# Patient Record
Sex: MALE | Race: WHITE | NOT HISPANIC OR LATINO | Employment: FULL TIME | URBAN - METROPOLITAN AREA
[De-identification: names, ages, dates, MRNs, and addresses within clinical notes are randomized per-mention and may not be internally consistent; named-entity substitution may affect disease eponyms.]

---

## 2019-02-20 ENCOUNTER — HOSPITAL ENCOUNTER (OUTPATIENT)
Facility: HOSPITAL | Age: 49
Discharge: HOME | End: 2019-02-21
Attending: EMERGENCY MEDICINE | Admitting: SURGERY
Payer: COMMERCIAL

## 2019-02-20 ENCOUNTER — ANESTHESIA EVENT (OUTPATIENT)
Dept: SURGERY | Facility: HOSPITAL | Age: 49
End: 2019-02-20
Payer: COMMERCIAL

## 2019-02-20 ENCOUNTER — ANESTHESIA (OUTPATIENT)
Dept: SURGERY | Facility: HOSPITAL | Age: 49
End: 2019-02-20
Payer: COMMERCIAL

## 2019-02-20 DIAGNOSIS — K61.1 PERIRECTAL ABSCESS: Primary | ICD-10-CM

## 2019-02-20 LAB
ALBUMIN SERPL BCP-MCNC: 3.9 G/DL
ALP SERPL-CCNC: 90 U/L
ALT SERPL W/O P-5'-P-CCNC: 50 U/L
ANION GAP SERPL CALC-SCNC: 10 MMOL/L
AST SERPL-CCNC: 24 U/L
BASOPHILS # BLD AUTO: 0.04 K/UL
BASOPHILS NFR BLD: 0.5 %
BILIRUB SERPL-MCNC: 0.5 MG/DL
BUN SERPL-MCNC: 13 MG/DL
CALCIUM SERPL-MCNC: 9.7 MG/DL
CHLORIDE SERPL-SCNC: 108 MMOL/L
CO2 SERPL-SCNC: 23 MMOL/L
CREAT SERPL-MCNC: 1 MG/DL
DIFFERENTIAL METHOD: ABNORMAL
EOSINOPHIL # BLD AUTO: 0.3 K/UL
EOSINOPHIL NFR BLD: 3.1 %
ERYTHROCYTE [DISTWIDTH] IN BLOOD BY AUTOMATED COUNT: 13.9 %
EST. GFR  (AFRICAN AMERICAN): >60 ML/MIN/1.73 M^2
EST. GFR  (NON AFRICAN AMERICAN): >60 ML/MIN/1.73 M^2
GLUCOSE SERPL-MCNC: 115 MG/DL
HCT VFR BLD AUTO: 45 %
HGB BLD-MCNC: 14.3 G/DL
LACTATE SERPL-SCNC: 1.3 MMOL/L
LYMPHOCYTES # BLD AUTO: 2.2 K/UL
LYMPHOCYTES NFR BLD: 25.3 %
MCH RBC QN AUTO: 28 PG
MCHC RBC AUTO-ENTMCNC: 31.8 G/DL
MCV RBC AUTO: 88 FL
MONOCYTES # BLD AUTO: 0.5 K/UL
MONOCYTES NFR BLD: 6.1 %
NEUTROPHILS # BLD AUTO: 5.6 K/UL
NEUTROPHILS NFR BLD: 64.5 %
PLATELET # BLD AUTO: 273 K/UL
PMV BLD AUTO: 11.3 FL
POTASSIUM SERPL-SCNC: 4.3 MMOL/L
PROT SERPL-MCNC: 7.3 G/DL
RBC # BLD AUTO: 5.11 M/UL
SODIUM SERPL-SCNC: 141 MMOL/L
WBC # BLD AUTO: 8.73 K/UL

## 2019-02-20 PROCEDURE — 63600175 PHARM REV CODE 636 W HCPCS: Performed by: NURSE ANESTHETIST, CERTIFIED REGISTERED

## 2019-02-20 PROCEDURE — 85025 COMPLETE CBC W/AUTO DIFF WBC: CPT

## 2019-02-20 PROCEDURE — 37000009 HC ANESTHESIA EA ADD 15 MINS: Performed by: SURGERY

## 2019-02-20 PROCEDURE — 87205 SMEAR GRAM STAIN: CPT

## 2019-02-20 PROCEDURE — 80053 COMPREHEN METABOLIC PANEL: CPT

## 2019-02-20 PROCEDURE — 25000003 PHARM REV CODE 250: Performed by: SURGERY

## 2019-02-20 PROCEDURE — 25000003 PHARM REV CODE 250: Performed by: NURSE PRACTITIONER

## 2019-02-20 PROCEDURE — 87116 MYCOBACTERIA CULTURE: CPT

## 2019-02-20 PROCEDURE — 27000190 HC CPAP FULL FACE MASK W/VALVE

## 2019-02-20 PROCEDURE — 25000003 PHARM REV CODE 250: Performed by: ANESTHESIOLOGY

## 2019-02-20 PROCEDURE — G0378 HOSPITAL OBSERVATION PER HR: HCPCS

## 2019-02-20 PROCEDURE — C9290 INJ, BUPIVACAINE LIPOSOME: HCPCS | Performed by: SURGERY

## 2019-02-20 PROCEDURE — 83605 ASSAY OF LACTIC ACID: CPT

## 2019-02-20 PROCEDURE — 25000003 PHARM REV CODE 250: Performed by: STUDENT IN AN ORGANIZED HEALTH CARE EDUCATION/TRAINING PROGRAM

## 2019-02-20 PROCEDURE — 36000704 HC OR TIME LEV I 1ST 15 MIN: Performed by: SURGERY

## 2019-02-20 PROCEDURE — 36000705 HC OR TIME LEV I EA ADD 15 MIN: Performed by: SURGERY

## 2019-02-20 PROCEDURE — 63600175 PHARM REV CODE 636 W HCPCS: Performed by: STUDENT IN AN ORGANIZED HEALTH CARE EDUCATION/TRAINING PROGRAM

## 2019-02-20 PROCEDURE — 25000003 PHARM REV CODE 250: Performed by: NURSE ANESTHETIST, CERTIFIED REGISTERED

## 2019-02-20 PROCEDURE — 94660 CPAP INITIATION&MGMT: CPT

## 2019-02-20 PROCEDURE — 99900035 HC TECH TIME PER 15 MIN (STAT)

## 2019-02-20 PROCEDURE — 87070 CULTURE OTHR SPECIMN AEROBIC: CPT

## 2019-02-20 PROCEDURE — 63600175 PHARM REV CODE 636 W HCPCS: Performed by: SURGERY

## 2019-02-20 PROCEDURE — 99285 EMERGENCY DEPT VISIT HI MDM: CPT | Mod: 25

## 2019-02-20 PROCEDURE — 87015 SPECIMEN INFECT AGNT CONCNTJ: CPT

## 2019-02-20 PROCEDURE — 87076 CULTURE ANAEROBE IDENT EACH: CPT

## 2019-02-20 PROCEDURE — 96372 THER/PROPH/DIAG INJ SC/IM: CPT | Mod: 59

## 2019-02-20 PROCEDURE — 87075 CULTR BACTERIA EXCEPT BLOOD: CPT

## 2019-02-20 PROCEDURE — 96360 HYDRATION IV INFUSION INIT: CPT

## 2019-02-20 PROCEDURE — 87040 BLOOD CULTURE FOR BACTERIA: CPT

## 2019-02-20 PROCEDURE — 87206 SMEAR FLUORESCENT/ACID STAI: CPT

## 2019-02-20 PROCEDURE — 94761 N-INVAS EAR/PLS OXIMETRY MLT: CPT

## 2019-02-20 PROCEDURE — S0030 INJECTION, METRONIDAZOLE: HCPCS | Performed by: NURSE ANESTHETIST, CERTIFIED REGISTERED

## 2019-02-20 PROCEDURE — 37000008 HC ANESTHESIA 1ST 15 MINUTES: Performed by: SURGERY

## 2019-02-20 PROCEDURE — 87102 FUNGUS ISOLATION CULTURE: CPT

## 2019-02-20 PROCEDURE — 71000033 HC RECOVERY, INTIAL HOUR: Performed by: SURGERY

## 2019-02-20 PROCEDURE — 25500020 PHARM REV CODE 255: Performed by: EMERGENCY MEDICINE

## 2019-02-20 RX ORDER — GLYCOPYRROLATE 0.2 MG/ML
INJECTION INTRAMUSCULAR; INTRAVENOUS
Status: DISCONTINUED | OUTPATIENT
Start: 2019-02-20 | End: 2019-02-20

## 2019-02-20 RX ORDER — BUPIVACAINE HYDROCHLORIDE 2.5 MG/ML
INJECTION, SOLUTION EPIDURAL; INFILTRATION; INTRACAUDAL
Status: DISCONTINUED | OUTPATIENT
Start: 2019-02-20 | End: 2019-02-20 | Stop reason: HOSPADM

## 2019-02-20 RX ORDER — ONDANSETRON 8 MG/1
8 TABLET, ORALLY DISINTEGRATING ORAL EVERY 8 HOURS PRN
Status: DISCONTINUED | OUTPATIENT
Start: 2019-02-20 | End: 2019-02-21 | Stop reason: HOSPADM

## 2019-02-20 RX ORDER — KETOROLAC TROMETHAMINE 30 MG/ML
30 INJECTION, SOLUTION INTRAMUSCULAR; INTRAVENOUS EVERY 6 HOURS
Status: DISCONTINUED | OUTPATIENT
Start: 2019-02-20 | End: 2019-02-21 | Stop reason: HOSPADM

## 2019-02-20 RX ORDER — ONDANSETRON 2 MG/ML
4 INJECTION INTRAMUSCULAR; INTRAVENOUS
Status: ACTIVE | OUTPATIENT
Start: 2019-02-20 | End: 2019-02-20

## 2019-02-20 RX ORDER — HYDRALAZINE HYDROCHLORIDE 20 MG/ML
INJECTION INTRAMUSCULAR; INTRAVENOUS
Status: DISCONTINUED | OUTPATIENT
Start: 2019-02-20 | End: 2019-02-20

## 2019-02-20 RX ORDER — METRONIDAZOLE 500 MG/100ML
INJECTION, SOLUTION INTRAVENOUS
Status: DISCONTINUED | OUTPATIENT
Start: 2019-02-20 | End: 2019-02-20

## 2019-02-20 RX ORDER — CLINDAMYCIN HYDROCHLORIDE 150 MG/1
600 CAPSULE ORAL
Status: COMPLETED | OUTPATIENT
Start: 2019-02-20 | End: 2019-02-20

## 2019-02-20 RX ORDER — ESMOLOL HYDROCHLORIDE 10 MG/ML
INJECTION INTRAVENOUS
Status: DISCONTINUED | OUTPATIENT
Start: 2019-02-20 | End: 2019-02-20

## 2019-02-20 RX ORDER — PROPOFOL 10 MG/ML
VIAL (ML) INTRAVENOUS
Status: DISCONTINUED | OUTPATIENT
Start: 2019-02-20 | End: 2019-02-20

## 2019-02-20 RX ORDER — SUCCINYLCHOLINE CHLORIDE 20 MG/ML
INJECTION INTRAMUSCULAR; INTRAVENOUS
Status: DISCONTINUED | OUTPATIENT
Start: 2019-02-20 | End: 2019-02-20

## 2019-02-20 RX ORDER — ONDANSETRON HYDROCHLORIDE 2 MG/ML
INJECTION, SOLUTION INTRAMUSCULAR; INTRAVENOUS
Status: DISCONTINUED | OUTPATIENT
Start: 2019-02-20 | End: 2019-02-20

## 2019-02-20 RX ORDER — MORPHINE SULFATE 4 MG/ML
2 INJECTION, SOLUTION INTRAMUSCULAR; INTRAVENOUS EVERY 5 MIN PRN
Status: DISCONTINUED | OUTPATIENT
Start: 2019-02-20 | End: 2019-02-20 | Stop reason: HOSPADM

## 2019-02-20 RX ORDER — MORPHINE SULFATE 4 MG/ML
4 INJECTION, SOLUTION INTRAMUSCULAR; INTRAVENOUS
Status: ACTIVE | OUTPATIENT
Start: 2019-02-20 | End: 2019-02-20

## 2019-02-20 RX ORDER — LIDOCAINE HYDROCHLORIDE 10 MG/ML
1 INJECTION, SOLUTION EPIDURAL; INFILTRATION; INTRACAUDAL; PERINEURAL ONCE
Status: DISCONTINUED | OUTPATIENT
Start: 2019-02-20 | End: 2019-02-21 | Stop reason: HOSPADM

## 2019-02-20 RX ORDER — IBUPROFEN 600 MG/1
600 TABLET ORAL
Status: COMPLETED | OUTPATIENT
Start: 2019-02-20 | End: 2019-02-20

## 2019-02-20 RX ORDER — SODIUM CHLORIDE 0.9 % (FLUSH) 0.9 %
3 SYRINGE (ML) INJECTION
Status: DISCONTINUED | OUTPATIENT
Start: 2019-02-20 | End: 2019-02-21 | Stop reason: HOSPADM

## 2019-02-20 RX ORDER — DIPHENHYDRAMINE HYDROCHLORIDE 50 MG/ML
25 INJECTION INTRAMUSCULAR; INTRAVENOUS EVERY 6 HOURS PRN
Status: DISCONTINUED | OUTPATIENT
Start: 2019-02-20 | End: 2019-02-20 | Stop reason: HOSPADM

## 2019-02-20 RX ORDER — ENOXAPARIN SODIUM 100 MG/ML
40 INJECTION SUBCUTANEOUS EVERY 24 HOURS
Status: DISCONTINUED | OUTPATIENT
Start: 2019-02-20 | End: 2019-02-21 | Stop reason: HOSPADM

## 2019-02-20 RX ORDER — ACETAMINOPHEN 325 MG/1
650 TABLET ORAL EVERY 8 HOURS PRN
Status: DISCONTINUED | OUTPATIENT
Start: 2019-02-20 | End: 2019-02-21 | Stop reason: HOSPADM

## 2019-02-20 RX ORDER — RAMELTEON 8 MG/1
8 TABLET ORAL NIGHTLY PRN
Status: DISCONTINUED | OUTPATIENT
Start: 2019-02-20 | End: 2019-02-21 | Stop reason: HOSPADM

## 2019-02-20 RX ORDER — DIPHENHYDRAMINE HYDROCHLORIDE 50 MG/ML
25 INJECTION INTRAMUSCULAR; INTRAVENOUS EVERY 4 HOURS PRN
Status: DISCONTINUED | OUTPATIENT
Start: 2019-02-20 | End: 2019-02-21 | Stop reason: HOSPADM

## 2019-02-20 RX ORDER — FENTANYL CITRATE 50 UG/ML
INJECTION, SOLUTION INTRAMUSCULAR; INTRAVENOUS
Status: DISCONTINUED | OUTPATIENT
Start: 2019-02-20 | End: 2019-02-20

## 2019-02-20 RX ORDER — CEFAZOLIN SODIUM 1 G/3ML
INJECTION, POWDER, FOR SOLUTION INTRAMUSCULAR; INTRAVENOUS
Status: DISCONTINUED | OUTPATIENT
Start: 2019-02-20 | End: 2019-02-20

## 2019-02-20 RX ORDER — SODIUM CHLORIDE, SODIUM LACTATE, POTASSIUM CHLORIDE, CALCIUM CHLORIDE 600; 310; 30; 20 MG/100ML; MG/100ML; MG/100ML; MG/100ML
INJECTION, SOLUTION INTRAVENOUS CONTINUOUS PRN
Status: DISCONTINUED | OUTPATIENT
Start: 2019-02-20 | End: 2019-02-20

## 2019-02-20 RX ORDER — ONDANSETRON 2 MG/ML
4 INJECTION INTRAMUSCULAR; INTRAVENOUS DAILY PRN
Status: DISCONTINUED | OUTPATIENT
Start: 2019-02-20 | End: 2019-02-20 | Stop reason: HOSPADM

## 2019-02-20 RX ORDER — SODIUM CHLORIDE 0.9 % (FLUSH) 0.9 %
3 SYRINGE (ML) INJECTION EVERY 8 HOURS
Status: DISCONTINUED | OUTPATIENT
Start: 2019-02-20 | End: 2019-02-20 | Stop reason: HOSPADM

## 2019-02-20 RX ORDER — OXYMETAZOLINE HCL 0.05 %
2 SPRAY, NON-AEROSOL (ML) NASAL 2 TIMES DAILY
Status: DISCONTINUED | OUTPATIENT
Start: 2019-02-20 | End: 2019-02-21 | Stop reason: HOSPADM

## 2019-02-20 RX ORDER — BACITRACIN 50000 [IU]/1
INJECTION, POWDER, FOR SOLUTION INTRAMUSCULAR
Status: DISCONTINUED | OUTPATIENT
Start: 2019-02-20 | End: 2019-02-20 | Stop reason: HOSPADM

## 2019-02-20 RX ORDER — LIDOCAINE HYDROCHLORIDE 10 MG/ML
INJECTION, SOLUTION EPIDURAL; INFILTRATION; INTRACAUDAL; PERINEURAL
Status: DISCONTINUED | OUTPATIENT
Start: 2019-02-20 | End: 2019-02-20 | Stop reason: HOSPADM

## 2019-02-20 RX ORDER — MIDAZOLAM HYDROCHLORIDE 1 MG/ML
INJECTION INTRAMUSCULAR; INTRAVENOUS
Status: DISCONTINUED | OUTPATIENT
Start: 2019-02-20 | End: 2019-02-20

## 2019-02-20 RX ORDER — LIDOCAINE HCL/PF 100 MG/5ML
SYRINGE (ML) INTRAVENOUS
Status: DISCONTINUED | OUTPATIENT
Start: 2019-02-20 | End: 2019-02-20

## 2019-02-20 RX ORDER — OMEPRAZOLE 20 MG/1
40 CAPSULE, DELAYED RELEASE ORAL NIGHTLY
COMMUNITY
End: 2019-04-03

## 2019-02-20 RX ORDER — CIPROFLOXACIN 500 MG/1
500 TABLET ORAL EVERY 12 HOURS
Status: DISCONTINUED | OUTPATIENT
Start: 2019-02-20 | End: 2019-02-21 | Stop reason: HOSPADM

## 2019-02-20 RX ORDER — VENLAFAXINE 25 MG/1
TABLET ORAL NIGHTLY
COMMUNITY
End: 2020-05-14

## 2019-02-20 RX ORDER — METRONIDAZOLE 500 MG/1
500 TABLET ORAL EVERY 8 HOURS
Status: DISCONTINUED | OUTPATIENT
Start: 2019-02-20 | End: 2019-02-21 | Stop reason: HOSPADM

## 2019-02-20 RX ORDER — TRAMADOL HYDROCHLORIDE 50 MG/1
100 TABLET ORAL EVERY 6 HOURS PRN
Status: DISCONTINUED | OUTPATIENT
Start: 2019-02-20 | End: 2019-02-21 | Stop reason: HOSPADM

## 2019-02-20 RX ADMIN — LIDOCAINE HYDROCHLORIDE 100 MG: 20 INJECTION, SOLUTION INTRAVENOUS at 06:02

## 2019-02-20 RX ADMIN — GLYCOPYRROLATE 0.2 MG: 0.2 INJECTION, SOLUTION INTRAMUSCULAR; INTRAVENOUS at 06:02

## 2019-02-20 RX ADMIN — METRONIDAZOLE 500 MG: 500 TABLET ORAL at 09:02

## 2019-02-20 RX ADMIN — ESMOLOL HYDROCHLORIDE 50 MG: 10 INJECTION, SOLUTION INTRAVENOUS at 06:02

## 2019-02-20 RX ADMIN — SODIUM CHLORIDE 1000 ML: 0.9 INJECTION, SOLUTION INTRAVENOUS at 10:02

## 2019-02-20 RX ADMIN — Medication 2 SPRAY: at 07:02

## 2019-02-20 RX ADMIN — HYDRALAZINE HYDROCHLORIDE 10 MG: 20 INJECTION INTRAMUSCULAR; INTRAVENOUS at 06:02

## 2019-02-20 RX ADMIN — CEFAZOLIN 3 G: 330 INJECTION, POWDER, FOR SOLUTION INTRAMUSCULAR; INTRAVENOUS at 06:02

## 2019-02-20 RX ADMIN — IBUPROFEN 600 MG: 600 TABLET, FILM COATED ORAL at 01:02

## 2019-02-20 RX ADMIN — SUCCINYLCHOLINE CHLORIDE 200 MG: 20 INJECTION, SOLUTION INTRAMUSCULAR; INTRAVENOUS at 06:02

## 2019-02-20 RX ADMIN — ONDANSETRON 8 MG: 2 INJECTION, SOLUTION INTRAMUSCULAR; INTRAVENOUS at 06:02

## 2019-02-20 RX ADMIN — PROPOFOL 50 MG: 10 INJECTION, EMULSION INTRAVENOUS at 06:02

## 2019-02-20 RX ADMIN — CIPROFLOXACIN HYDROCHLORIDE 500 MG: 500 TABLET, FILM COATED ORAL at 09:02

## 2019-02-20 RX ADMIN — KETOROLAC TROMETHAMINE 30 MG: 30 INJECTION, SOLUTION INTRAMUSCULAR at 09:02

## 2019-02-20 RX ADMIN — CLINDAMYCIN HYDROCHLORIDE 600 MG: 150 CAPSULE ORAL at 11:02

## 2019-02-20 RX ADMIN — METRONIDAZOLE 500 MG: 500 SOLUTION INTRAVENOUS at 06:02

## 2019-02-20 RX ADMIN — PROPOFOL 200 MG: 10 INJECTION, EMULSION INTRAVENOUS at 06:02

## 2019-02-20 RX ADMIN — SODIUM CHLORIDE, SODIUM LACTATE, POTASSIUM CHLORIDE, AND CALCIUM CHLORIDE: .6; .31; .03; .02 INJECTION, SOLUTION INTRAVENOUS at 06:02

## 2019-02-20 RX ADMIN — ENOXAPARIN SODIUM 40 MG: 100 INJECTION SUBCUTANEOUS at 09:02

## 2019-02-20 RX ADMIN — IOHEXOL 100 ML: 350 INJECTION, SOLUTION INTRAVENOUS at 11:02

## 2019-02-20 RX ADMIN — FENTANYL CITRATE 150 MCG: 50 INJECTION, SOLUTION INTRAMUSCULAR; INTRAVENOUS at 06:02

## 2019-02-20 NOTE — ED TRIAGE NOTES
Pt reports rectal pain 10 out of 10 on pain scale. Pt denies trauma to rectum or bleeding. Pt states he ate pizza 2 weeks ago and since then has pain to left side of rectum. Pt is using Hemorid cream, rectal care, Cortizone, suppository at home, pt states he has hx. Of Hemorid and took 3 Advil today.

## 2019-02-20 NOTE — ED NOTES
APPEARANCE: Alert, oriented and in no acute distress.  CARDIAC: bradycardic   PERIPHERAL VASCULAR: peripheral pulses present. Normal cap refill. No edema. Warm to touch.    RESPIRATORY:Normal rate and effort, breath sounds clear bilaterally throughout chest. Respirations are equal and unlabored no obvious signs of distress.  GASTRO: soft, bowel sounds normal, no tenderness, no abdominal distention. Burning in rectum while passing stool.  MUSC: Full ROM. No bony tenderness or soft tissue tenderness. No obvious deformity.  SKIN: Skin is warm and dry, normal skin turgor, mucous membranes moist.   MENTAL STATUS: awake, alert and aware of environment.  EYE: PERRL, both eyes: pupils brisk and reactive to light. Normal size.  ENT: EARS: no obvious drainage. NOSE: no active bleeding.     GENITALIA: Normal external genitalia.   Pt denies chest pain/sob.

## 2019-02-20 NOTE — H&P
General Surgery Service  H&P    Chief Complaint:  Anal pain    HPI:  48M c 2 week history of anal pain  No prior cscope  Has a gastroenterologist for UGI symptoms of GERD  No history of anal complaints  Has normal daily BMs    PMH:  History reviewed. No pertinent past medical history.    PSH:  Past Surgical History:   Procedure Laterality Date    APPENDECTOMY       FH:  History reviewed. No pertinent family history.    Social:  Social History     Socioeconomic History    Marital status:      Spouse name: Not on file    Number of children: Not on file    Years of education: Not on file    Highest education level: Not on file   Social Needs    Financial resource strain: Not on file    Food insecurity - worry: Not on file    Food insecurity - inability: Not on file    Transportation needs - medical: Not on file    Transportation needs - non-medical: Not on file   Occupational History    Not on file   Tobacco Use    Smoking status: Never Smoker   Substance and Sexual Activity    Alcohol use: No     Frequency: Never    Drug use: No    Sexual activity: Not on file   Other Topics Concern    Not on file   Social History Narrative    Not on file     Allergies:  Review of patient's allergies indicates:  No Known Allergies    Meds:  No current facility-administered medications on file prior to encounter.      Current Outpatient Medications on File Prior to Encounter   Medication Sig Dispense Refill    omeprazole (PRILOSEC) 20 MG capsule Take 20 mg by mouth once daily.      venlafaxine (EFFEXOR) 25 MG Tab Take by mouth 2 (two) times daily.           ROS:  Review of Systems   Constitutional: Negative for chills, diaphoresis, fever, malaise/fatigue and weight loss.   Cardiovascular: Negative for chest pain, palpitations, orthopnea, claudication, leg swelling and PND.   Genitourinary: Negative for dysuria, flank pain, frequency, hematuria and urgency.   Neurological: Negative for weakness.  "        Vitals:  Vitals:    02/20/19 0944 02/20/19 1359 02/20/19 1600 02/20/19 1602   BP: (!) 188/105   (!) 176/96   BP Location: Right arm   Right arm   Patient Position: Sitting   Lying   Pulse: (!) 55   (!) 56   Resp: 20 17   Temp: 98.6 °F (37 °C) 98.5 °F (36.9 °C) 98.4 °F (36.9 °C) 98.4 °F (36.9 °C)   TempSrc: Oral Oral Oral Oral   SpO2: 97%   98%   Weight: (!) 142.9 kg (315 lb)      Height: 6' 2" (1.88 m)        No intake/output data recorded.  I/O this shift:  In: 1000 [IV Piggyback:1000]  Out: -         Intake/Output Summary (Last 24 hours) at 2/20/2019 1754  Last data filed at 2/20/2019 1135  Gross per 24 hour   Intake 1000 ml   Output --   Net 1000 ml         Physical Exam:  Gen - No acute distress, awake/alert/oriented  HEENT - Normocephalic, atraumatic  CV - Regular rate and rhythm  Resp - normal work of breathing  Abd - Soft, not tender, not distended  Ext - Warm and well perfused  Vasc - palpable distal pulses BUE    Rectal - left posterior abscess very tender to palpation with erythema and fluctuance just lateral to external anal sphincter complex 1-2 cm in size    Physical Exam      Labs:  Recent Labs   Lab 02/20/19  1022   WBC 8.73   RBC 5.11   HGB 14.3   HCT 45.0      MCV 88   MCH 28.0   MCHC 31.8*     Recent Labs   Lab 02/20/19  1022      K 4.3      CO2 23   BUN 13   CREATININE 1.0     Recent Labs   Lab 02/20/19  1022   CALCIUM 9.7   PROT 7.3      K 4.3   CO2 23      BUN 13   CREATININE 1.0   ALKPHOS 90   ALT 50*   AST 24   BILITOT 0.5         Radiology:  Imaging Results          CT Pelvis With Contrast (Final result)  Result time 02/20/19 12:38:59    Final result by Talon Rios MD (02/20/19 12:38:59)                 Impression:      1. A focal submucosal lucency along the left lateral wall of the anus, likely representing a external hemorrhoid.  Remote possibility of an inflammatory process in this region.  2. Fat containing right inguinal hernia.  3. Rest of " the examination is unremarkable.      Electronically signed by: Tlaon Rios MD  Date:    02/20/2019  Time:    12:38             Narrative:    EXAMINATION:  CT PELVIS WITH  CONTRAST    CLINICAL HISTORY:  rectal pain.  r/o abscess;    TECHNIQUE:  Axial CT scans of the pelvis are performed after intravenous administration of iodinated contrast (100 cc of Omni 350).  Multiplanar reconstructions are performed.    COMPARISON:  None    FINDINGS:  The ischiorectal fossa are symmetric bilaterally and within normal limits without any definite signs for inflammatory processes or perirectal abscesses.    There is suggestion of a focus along the left lateral wall of the anal verge on the left (image 121 series 601 image 81 series 2).  It is possible that this could represent a hemorrhoid.  Less likely possibility of a submucosal inflammatory process.  It should be noted there is no perianal fat infiltration.    Within the pelvis the rectum and the perirectal fat demonstrate no significant abnormalities.  There is no significant inflammatory processes of the sigmoid colon.  Within the pelvis there is no significant abnormal adenopathy.  Urinary bladder is unremarkable.  There is normal appearance of the prostate.  There is a small fat containing right inguinal hernia.    The visualized osseous structures of the sacrum and pelvis demonstrate no osteoblastic or lytic lesions.  SI joints are symmetric and within normal limits.                                  Micro:  Microbiology Results (last 7 days)     Procedure Component Value Units Date/Time    Blood culture #1 **CANNOT BE ORDERED STAT** [[id number]] Collected:  02/20/19 1019    Order Status:  Sent Specimen:  Blood from Peripheral, Antecubital, Left Updated:  02/20/19 1358              Assessment and Plan:  48M c perirectal abscess, subcutaneous    To OR for exam under anesthesia with incision and drainage of perirectal abscess  Dispo: to be determined after surgery but can  likely go home unless complications from obstructive sleep apnea as this is uncomplicated and not associated with diabetes or immunocompormized state    Russel Shah MD - 4

## 2019-02-20 NOTE — ANESTHESIA PREPROCEDURE EVALUATION
"                                                                                                             02/20/2019  Nael Velazquez is a 48 y.o., male having  I & D rectal abscess.  PMH - sleep apnea.  NPO today.  NKDA.    Anesthesia Evaluation    I have reviewed the Patient Summary Reports.    I have reviewed the Nursing Notes.   I have reviewed the Medications.     Review of Systems  Anesthesia Hx:  Personal Hx of Anesthesia complications (oxygen saturations abnormally low in PACU following  sinus and appendectomy.  Did not require mechanical ventilation.  May be attributed to rapid administration of narcotic analgesic "dilaudid" )       Physical Exam  General:  Morbid Obesity    Airway/Jaw/Neck:  Airway Findings: Mouth Opening: Normal Tongue: Normal  General Airway Assessment: Adult  Mallampati: III  Improves to II with phonation.  TM Distance: Normal, at least 6 cm  Jaw/Neck Findings:  Neck ROM: Normal ROM       Chest/Lungs:  Chest/Lungs Findings: Clear to auscultation, Normal Respiratory Rate     Heart/Vascular:  Heart Findings: Rate: Normal  Rhythm: Regular Rhythm  Sounds: Normal        Mental Status:  Mental Status Findings:  Alert and Oriented         Anesthesia Plan  Type of Anesthesia, risks & benefits discussed:  Anesthesia Type:  general  Patient's Preference:   Intra-op Monitoring Plan:   Intra-op Monitoring Plan Comments:   Post Op Pain Control Plan:   Post Op Pain Control Plan Comments:   Induction:   IV  Beta Blocker:         Informed Consent: Patient understands risks and agrees with Anesthesia plan.  Questions answered. Anesthesia consent signed with patient.  ASA Score: 3     Day of Surgery Review of History & Physical: I have interviewed and examined the patient. I have reviewed the patient's H&P dated:            Ready For Surgery From Anesthesia Perspective.   Results for ROBBIE VELAZQUEZ (MRN [id number]) as of 2/20/2019 18:34   Ref. Range 2/20/2019 10:22   Hemoglobin Latest Ref Range: 14.0 - 18.0 " g/dL 14.3   Hematocrit Latest Ref Range: 40.0 - 54.0 % 45.0   Results for ROBBIE VELAZQUEZ (MRN [id number]) as of 2/20/2019 18:34   Ref. Range 2/20/2019 10:22   Sodium Latest Ref Range: 136 - 145 mmol/L 141   Potassium Latest Ref Range: 3.5 - 5.1 mmol/L 4.3   Chloride Latest Ref Range: 95 - 110 mmol/L 108   CO2 Latest Ref Range: 23 - 29 mmol/L 23   Anion Gap Latest Ref Range: 8 - 16 mmol/L 10   BUN, Bld Latest Ref Range: 6 - 20 mg/dL 13   Creatinine Latest Ref Range: 0.5 - 1.4 mg/dL 1.0   eGFR if non African American Latest Ref Range: >60 mL/min/1.73 m^2 >60   eGFR if  Latest Ref Range: >60 mL/min/1.73 m^2 >60   Glucose Latest Ref Range: 70 - 110 mg/dL 115 (H)   Calcium Latest Ref Range: 8.7 - 10.5 mg/dL 9.7   Alkaline Phosphatase Latest Ref Range: 55 - 135 U/L 90   Total Protein Latest Ref Range: 6.0 - 8.4 g/dL 7.3   Albumin Latest Ref Range: 3.5 - 5.2 g/dL 3.9   Total Bilirubin Latest Ref Range: 0.1 - 1.0 mg/dL 0.5   AST Latest Ref Range: 10 - 40 U/L 24   ALT Latest Ref Range: 10 - 44 U/L 50 (H)   Sinus bradycardia  Otherwise normal ECG  No previous ECGs available  Temp (°C)   36.9-37  36.9 (98.4)  02/20 1602   Pulse   55-56  56  02/20 1602   Resp   17-20  17  02/20 1602   BP   176//105  176/96  02/20 1602   SpO2 (%)   97-98  98  02/20 1602   Weight (kg)   142.9  142.9 kg (315 lb)  02/20 09

## 2019-02-20 NOTE — ED PROVIDER NOTES
Encounter Date: 2/20/2019       History     Chief Complaint   Patient presents with    Rectal Pain     x1.5 weeks. Reports swelling and pain. Reports worst pain of his life. Reports hard nodules.      48-year-old previously healthy male presents the ED for rectal pain for nearly 2 weeks.  He reports the pain and swelling is gradually getting worse.  He has been using OTC medications including hydrocortisone cream without improvement.  No trauma, fever/chills, abdominal pain, constipation, or diarrhea.  Walking, sitting, bowel movements, and palpation all make the pain worse.  Nothing seems to help.  He presents the ED today because his symptoms seem to be getting worse.  No previous similar problems.  He reports history of hemorrhoids multiple years ago.  No bloody stools.  No other complaints at this time.      The history is provided by the patient.     Review of patient's allergies indicates:  No Known Allergies  History reviewed. No pertinent past medical history.  Past Surgical History:   Procedure Laterality Date    APPENDECTOMY       History reviewed. No pertinent family history.  Social History     Tobacco Use    Smoking status: Never Smoker   Substance Use Topics    Alcohol use: No     Frequency: Never    Drug use: No     Review of Systems   Constitutional: Positive for activity change. Negative for fever.   HENT: Negative for congestion.    Respiratory: Negative for cough.    Gastrointestinal: Positive for rectal pain. Negative for abdominal pain, constipation, diarrhea and vomiting.   Genitourinary: Negative for difficulty urinating and dysuria.   Musculoskeletal: Negative for back pain and gait problem.   Skin: Negative for rash.   Allergic/Immunologic: Negative for immunocompromised state.   Neurological: Negative for weakness and headaches.   Psychiatric/Behavioral: Negative for confusion.       Physical Exam     Initial Vitals [02/20/19 0944]   BP Pulse Resp Temp SpO2   (!) 188/105 (!) 55 20 98.6  °F (37 °C) 97 %      MAP       --         Physical Exam    Nursing note and vitals reviewed.  Constitutional: He appears well-developed and well-nourished. He is Obese . He is active and cooperative. He is easily aroused.  Non-toxic appearance. He does not have a sickly appearance. He does not appear ill. No distress.   HENT:   Head: Normocephalic and atraumatic.   Eyes: Conjunctivae are normal.   Neck: Normal range of motion.   Cardiovascular: Normal rate, regular rhythm and normal heart sounds.   Pulmonary/Chest: Effort normal and breath sounds normal.   Abdominal: Soft. Normal appearance and bowel sounds are normal. There is no tenderness.   Genitourinary: Testes normal and penis normal. Rectal exam shows tenderness. Rectal exam shows no external hemorrhoid. Circumcised.   Genitourinary Comments: Left perirectal erythema and TTP with areas of induration, extending to rectum.  No visualized hemorrhoid.  Pt could not tolerate internal exam.    Neurological: He is alert, oriented to person, place, and time and easily aroused. GCS eye subscore is 4. GCS verbal subscore is 5. GCS motor subscore is 6.   Skin: Skin is warm, dry and intact. No rash noted.   Psychiatric: He has a normal mood and affect. His speech is normal and behavior is normal. Judgment and thought content normal. Cognition and memory are normal.         ED Course   Procedures  Labs Reviewed   CBC W/ AUTO DIFFERENTIAL - Abnormal; Notable for the following components:       Result Value    MCHC 31.8 (*)     All other components within normal limits   COMPREHENSIVE METABOLIC PANEL - Abnormal; Notable for the following components:    Glucose 115 (*)     ALT 50 (*)     All other components within normal limits   CULTURE, BLOOD   LACTIC ACID, PLASMA          Imaging Results          CT Pelvis With Contrast (Final result)  Result time 02/20/19 12:38:59    Final result by Talon Rios MD (02/20/19 12:38:59)                 Impression:      1. A focal  submucosal lucency along the left lateral wall of the anus, likely representing a external hemorrhoid.  Remote possibility of an inflammatory process in this region.  2. Fat containing right inguinal hernia.  3. Rest of the examination is unremarkable.      Electronically signed by: Talon Rios MD  Date:    02/20/2019  Time:    12:38             Narrative:    EXAMINATION:  CT PELVIS WITH  CONTRAST    CLINICAL HISTORY:  rectal pain.  r/o abscess;    TECHNIQUE:  Axial CT scans of the pelvis are performed after intravenous administration of iodinated contrast (100 cc of Omni 350).  Multiplanar reconstructions are performed.    COMPARISON:  None    FINDINGS:  The ischiorectal fossa are symmetric bilaterally and within normal limits without any definite signs for inflammatory processes or perirectal abscesses.    There is suggestion of a focus along the left lateral wall of the anal verge on the left (image 121 series 601 image 81 series 2).  It is possible that this could represent a hemorrhoid.  Less likely possibility of a submucosal inflammatory process.  It should be noted there is no perianal fat infiltration.    Within the pelvis the rectum and the perirectal fat demonstrate no significant abnormalities.  There is no significant inflammatory processes of the sigmoid colon.  Within the pelvis there is no significant abnormal adenopathy.  Urinary bladder is unremarkable.  There is normal appearance of the prostate.  There is a small fat containing right inguinal hernia.    The visualized osseous structures of the sacrum and pelvis demonstrate no osteoblastic or lytic lesions.  SI joints are symmetric and within normal limits.                                 Medical Decision Making:   Initial Assessment:   48-year-old male here for worsening rectal pain over the past 2 weeks.  No trauma, fever/chills, abdominal pain, vomiting, or diarrhea.  No rectal bleeding.  The patient reports history of hemorrhoids more than 20  years ago.  No improvement with OTC medications.  The patient appears well, nontoxic.  He has left perirectal erythema, tenderness to palpation, and swelling.  There is rectal involvement.  There are several indurated areas to the left perirectal area.  No visualized hemorrhoid.  No bleeding.  Differential Diagnosis:   Abscess, cellulitis, hemorrhoid,  Clinical Tests:   Lab Tests: Ordered and Reviewed  Radiological Study: Ordered and Reviewed  ED Management:  Labs, IV fluids, CT scan, clindamycin  Other:   I have discussed this case with another health care provider.       <> Summary of the Discussion:  did see this patient.   The patient declined morphine and Zofran.  WBC normal. Lactic normal.   CT reveals possible external hemorrhoid.  No hemorrhoid visualized on exam.  Surgery was consulted due to the patient's complaint and exam.  They were unable to perform a thorough rectal exam, and believe the patient needs an exam under anesthesia.  Patient agreeable to plan.              Attending Attestation:     Physician Attestation Statement for NP/PA:   I have conducted a face to face encounter with this patient in addition to the NP/PA, due to NP/PA Request    Other NP/PA Attestation Additions:      Medical Decision Making: Patient is 47 y/o male here with worsening rectal pain for 2 weeks.  Has h/o hemorrhoids but states that this pain is worse.  VSS with exception of HTN.  Nontoxic appearing, NAD.  No external hemorrhoids on exam.  + area of induration to left perirectal area.  CT read as possible external hemorrhoid though none on physical exam.  General surgery consulted and would like to take patient to OR for examination under anesthesia.                   ED Course as of Feb 20 1420 Wed Feb 20, 2019   1011 BP: (!) 188/105 [LD]   1011 Temp: 98.6 °F (37 °C) [LD]   1011 Pulse: (!) 55 [LD]   1011 Resp: 20 [LD]   1011 SpO2: 97 % [LD]   1120 Creatinine: 1.0 [LD]   1303 General surgery consulted  [LD]    1356 General surgery at bedside  [LD]      ED Course User Index  [LD] Gabby Alamo MD     Clinical Impression:       ICD-10-CM ICD-9-CM   1. Perirectal abscess K61.1 566                                ROGER Wood  02/20/19 1429       Gabby Alamo MD  02/20/19 9633

## 2019-02-21 VITALS
RESPIRATION RATE: 20 BRPM | WEIGHT: 315 LBS | DIASTOLIC BLOOD PRESSURE: 81 MMHG | HEIGHT: 74 IN | SYSTOLIC BLOOD PRESSURE: 156 MMHG | TEMPERATURE: 98 F | BODY MASS INDEX: 40.43 KG/M2 | HEART RATE: 52 BPM | OXYGEN SATURATION: 96 %

## 2019-02-21 LAB
GRAM STN SPEC: NORMAL
GRAM STN SPEC: NORMAL

## 2019-02-21 PROCEDURE — 94660 CPAP INITIATION&MGMT: CPT

## 2019-02-21 PROCEDURE — 63600175 PHARM REV CODE 636 W HCPCS: Performed by: STUDENT IN AN ORGANIZED HEALTH CARE EDUCATION/TRAINING PROGRAM

## 2019-02-21 PROCEDURE — G0378 HOSPITAL OBSERVATION PER HR: HCPCS

## 2019-02-21 PROCEDURE — 25000003 PHARM REV CODE 250: Performed by: STUDENT IN AN ORGANIZED HEALTH CARE EDUCATION/TRAINING PROGRAM

## 2019-02-21 PROCEDURE — 94761 N-INVAS EAR/PLS OXIMETRY MLT: CPT

## 2019-02-21 RX ORDER — METRONIDAZOLE 500 MG/1
500 TABLET ORAL EVERY 8 HOURS
Qty: 21 TABLET | Refills: 0 | Status: SHIPPED | OUTPATIENT
Start: 2019-02-21 | End: 2019-04-03

## 2019-02-21 RX ORDER — TRAMADOL HYDROCHLORIDE 50 MG/1
100 TABLET ORAL EVERY 6 HOURS PRN
Qty: 24 TABLET | Refills: 0 | Status: SHIPPED | OUTPATIENT
Start: 2019-02-21 | End: 2019-04-03

## 2019-02-21 RX ORDER — CIPROFLOXACIN 500 MG/1
500 TABLET ORAL EVERY 12 HOURS
Qty: 14 TABLET | Refills: 0 | Status: SHIPPED | OUTPATIENT
Start: 2019-02-21 | End: 2019-04-03

## 2019-02-21 RX ADMIN — KETOROLAC TROMETHAMINE 30 MG: 30 INJECTION, SOLUTION INTRAMUSCULAR at 11:02

## 2019-02-21 RX ADMIN — Medication 2 SPRAY: at 08:02

## 2019-02-21 RX ADMIN — KETOROLAC TROMETHAMINE 30 MG: 30 INJECTION, SOLUTION INTRAMUSCULAR at 03:02

## 2019-02-21 RX ADMIN — METRONIDAZOLE 500 MG: 500 TABLET ORAL at 05:02

## 2019-02-21 RX ADMIN — METRONIDAZOLE 500 MG: 500 TABLET ORAL at 03:02

## 2019-02-21 RX ADMIN — KETOROLAC TROMETHAMINE 30 MG: 30 INJECTION, SOLUTION INTRAMUSCULAR at 07:02

## 2019-02-21 RX ADMIN — CIPROFLOXACIN HYDROCHLORIDE 500 MG: 500 TABLET, FILM COATED ORAL at 07:02

## 2019-02-21 NOTE — PLAN OF CARE
This  put name on white board and explained blue discharge folder to patient. Discharge planning brochure and/or business card given to patient.  Patient verbalized understanding.    TN met with patient. He states that prior to arrival he was living at home with 11 year old son and wife. Pt states his adult child comes home from college and stays there from time to time. Pt able to drive and has no needs for HH or DME. Per patient parents will be bringing him home as wife is at work. Follow up appointment has been scheduled. Will follow.    Future Appointments   Date Time Provider Department Center   3/7/2019  1:45 PM Donavan Washington MD [facility]        02/21/19 4214   Discharge Assessment   Assessment Type Discharge Planning Assessment   Confirmed/corrected address and phone number on facesheet? Yes   Assessment information obtained from? Patient;Medical Record   Expected Length of Stay (days) 1   Communicated expected length of stay with patient/caregiver yes   Prior to hospitilization cognitive status: Alert/Oriented   Prior to hospitalization functional status: Independent   Current cognitive status: Alert/Oriented   Current Functional Status: Independent   Facility Arrived From: Home   Lives With spouse;child(reece), dependent;child(reece), adult   Able to Return to Prior Arrangements yes   Is patient able to care for self after discharge? Yes   Who are your caregiver(s) and their phone number(s)? Cheri (wife) [phone]   Patient's perception of discharge disposition home or selfcare   Readmission Within the Last 30 Days no previous admission in last 30 days   Patient currently being followed by outpatient case management? No   Patient currently receives any other outside agency services? No   Equipment Currently Used at Home none   Do you have any problems affording any of your prescribed medications? No   Is the patient taking medications as prescribed? yes   Does the patient  have transportation home? Yes   Transportation Anticipated family or friend will provide   Does the patient receive services at the [facility]? No   Discharge Plan A Home with family;Home   DME Needed Upon Discharge  none   Patient/Family in Agreement with Plan yes

## 2019-02-21 NOTE — PLAN OF CARE
Cued into patient's room.  Permission received per patient to turn camera to view patient.  Introduced as VN for night shift that will be working with floor nurse and nursing assistant.  Educated patient on VN's role in patient care. Plan of care reviewed with patient. Education per flowsheet.  Opportunity given for questions and questions answered.  Admission assessment questions answered.  No complaints.  Instructed to call for assistance.  Will cont to monitor.

## 2019-02-21 NOTE — OP NOTE
Operative Report    Date: 2/20/19  Patient: Sebas Bravo    Staff: INNA Altamirano MD  Resident: MD Gerald Peralta MD    Procedure: Incision and drainage of perirectal abscess  Pre-Operative Diagnosis: Perirectal abscess, subcutaneous  Post-Operative Diagnosis: same    Estimated Blood Loss: 10 cc  Complications: none  Specimen: cultures  Anesthesia: general  Position: lithotomy    Indication:  48 year old male with no history of diabetes or immunocompromised state presents with 2 weeks of worsening perirectal pain that became excruciating so he came to the ED and his physical exam was consistent with a perirectal abscess about 2 cm with erythema and fluctuance.    Findings:  2 cm abscess with 20 cc gross purulence in the posterior, left position lateral to the external anal sphincter and only in the subcutaneous plane, not tracking any deeper    Technique:  The patient was brought to the operating room and general endotracheal anesthesia was induced. He was placed in lithotomy position with care taken to protect all pressure points. The perirectal area was prepped and draped in usual sterile fashion. An incision was made over the area of maximal fluctuance which was posterior and left sided and lateral to the external anal sphincter complex. 20 cc of gross purulence drained and was cultured. The area was washed out and local anesthetic instilled subcutaneous. The abscess did not track deeper than subcutaneous. It was about 2 cm in size. Hemostasis was achieved with bovie electrocautery and the cavity packed with iodoform gauze, 1 piece. Sterile dressing was placed and the patient was extubated in stable condition and transferred to the post-anesthesia care unit.

## 2019-02-21 NOTE — PROGRESS NOTES
Patient arrived to unit via stretcher accompanied by PACU nurse. No distress noted, patient oriented to room and call light.

## 2019-02-21 NOTE — DISCHARGE SUMMARY
[facility]  General Surgery  Discharge Summary      Patient Name: Nael Bravo  MRN: [id number]  Admission Date: 2/20/2019  Hospital Length of Stay: 0 days  Discharge Date and Time: 2/21/2019  3:50 PM  Attending Physician: INNA Altamirano MD   Discharging Provider: Denny Karimi MD  Primary Care Provider: Elpidio Robertson MD     HPI: 48 year old male with perirectal abscess.     Procedure(s) (LRB):  INCISION AND DRAINAGE, PERIRECTAL REGION (Left)     Hospital Course: Patient presented to the ER and was evaluated by the resident. He was admitted and underwent incision and drainage of his perirectal abscess without any issues. He tolerated the procedure well. He was admitted and observed overnight due to his history of apnea. He was examined the next day and found to be ready for discharged. Patient was tolerating a regular diet, ambulating and voiding without any issues.     Consults:  none    Significant Diagnostic Studies: Radiology: CT scan: CT ABDOMEN PELVIS WITH CONTRAST: No results found for this visit on 02/20/19.    Pending Diagnostic Studies:     None        Final Active Diagnoses:      Problems Resolved During this Admission:    Diagnosis Date Noted Date Resolved POA    PRINCIPAL PROBLEM:  Perirectal abscess [K61.1]  02/21/2019 Yes      Discharged Condition: good    Disposition: Home or Self Care    Follow Up:    Patient Instructions:      Diet Adult Regular     Notify your health care provider if you experience any of the following:  temperature >100.4     Notify your health care provider if you experience any of the following:  increased confusion or weakness     Notify your health care provider if you experience any of the following:  persistent dizziness, light-headedness, or visual disturbances     Notify your health care provider if you experience any of the following:  worsening rash     Notify your health care provider if you experience any of the following:  severe  persistent headache     Notify your health care provider if you experience any of the following:  difficulty breathing or increased cough     Notify your health care provider if you experience any of the following:  redness, tenderness, or signs of infection (pain, swelling, redness, odor or green/yellow discharge around incision site)     Notify your health care provider if you experience any of the following:  severe uncontrolled pain     Notify your health care provider if you experience any of the following:  persistent nausea and vomiting or diarrhea     Remove dressing in 24 hours     Activity as tolerated     Medications:  Reconciled Home Medications:      Medication List      START taking these medications    ciprofloxacin HCl 500 MG tablet  Commonly known as:  CIPRO  Take 1 tablet (500 mg total) by mouth every 12 (twelve) hours.     metroNIDAZOLE 500 MG tablet  Commonly known as:  FLAGYL  Take 1 tablet (500 mg total) by mouth every 8 (eight) hours.     traMADol 50 mg tablet  Commonly known as:  ULTRAM  Take 2 tablets (100 mg total) by mouth every 6 (six) hours as needed.        CONTINUE taking these medications    omeprazole 20 MG capsule  Commonly known as:  PRILOSEC  Take 40 mg by mouth every evening.     venlafaxine 25 MG Tab  Commonly known as:  EFFEXOR  Take by mouth every evening.            Denny Karimi MD  General Surgery  [facility]

## 2019-02-21 NOTE — NURSING
Patient d/c home d/c instructions given to the patient.   Patient advised to start taking ciprofloxacin, Metronidazole and Tramadol.  Which were given to the patient as a written Rx.   Patient will keep all follow up appointments.    Patient verbalized understanding of the D/C instructions. Education given, refer to clinical reference for education.   Waiting for his mom to arrive to pick him up.  He will let the nurse know when she is here and we will put in for transport.

## 2019-02-21 NOTE — TRANSFER OF CARE
"Anesthesia Transfer of Care Note    Patient: Nael Bravo    Procedure(s) Performed: Procedure(s) (LRB):  INCISION AND DRAINAGE, PERIRECTAL REGION (Left)    Patient location: PACU    Anesthesia Type: general    Transport from OR: Transported from OR on 100% O2 by closed face mask with adequate spontaneous ventilation    Post pain: adequate analgesia    Post assessment: no apparent anesthetic complications    Post vital signs: stable    Level of consciousness: awake and alert    Nausea/Vomiting: no nausea/vomiting    Complications: none    Transfer of care protocol was followedComments: Report given to Desmond PACU RN      Last vitals:   Visit Vitals  BP (!) 176/96 (BP Location: Right arm, Patient Position: Lying)   Pulse (!) 56   Temp 36.9 °C (98.4 °F) (Oral)   Resp 17   Ht 6' 2" (1.88 m)   Wt (!) 142.9 kg (315 lb)   SpO2 98%   BMI 40.44 kg/m²     "

## 2019-02-21 NOTE — PLAN OF CARE
VN note: VN cued into pt's room for introduction. VN informed pt that VN would be working closely along side bedside nurse, PCT, and the rest of care team and making rounds throughout the shift. Pt verbalized understanding. Allowed time for questions. Patient denies any pain or discomfort at this time.   Patient educated on safety to call before getting up, because we don't want the patient to fall and harm themselves in any way.  VN will continue to be available to patient and intervene prn.        02/21/19 0939   Type of Frequent Check   Type Patient Rounds  (VN rounding)   Safety/Activity   Patient Rounds visualized patient;ID band on;call light in patient/parent reach   Safety Promotion/Fall Prevention side rails raised x 2   Safety Precautions emergency equipment at bedside   Positioning   Body Position supine   Head of Bed (HOB) HOB at 30-45 degrees   Pain/Comfort/Sleep   Preferred Pain Scale number (Numeric Rating Pain Scale)   Comfort/Acceptable Pain Level 3   Pain Rating (0-10): Rest 0   Pain Rating (0-10): Activity 0       Cardiac/Telemetry Details / Alarms   Cardiac/Telemetry Monitor On No   Cardiac/Telemetry Audible No   Cardiac/Telemetry Alarms Set No   Assessments (Pre/Post)   Level of Consciousness (AVPU) alert

## 2019-02-21 NOTE — ANESTHESIA POSTPROCEDURE EVALUATION
"Anesthesia Post Evaluation    Patient: Nael Bravo    Procedure(s) Performed: Procedure(s) (LRB):  INCISION AND DRAINAGE, PERIRECTAL REGION (Left)    Final Anesthesia Type: general  Patient location during evaluation: PACU  Level of consciousness: awake  Post-procedure vital signs: reviewed and stable  Pain management: adequate  PONV status at discharge: No PONV  Anesthetic complications: no      Cardiovascular status: stable  Respiratory status: spontaneous ventilation  Hydration status: euvolemic  Follow-up not needed.        Visit Vitals  /61 (BP Location: Left arm, Patient Position: Lying)   Pulse 60   Temp 36.7 °C (98 °F) (Axillary)   Resp 18   Ht 6' 2" (1.88 m)   Wt (!) 155.6 kg (343 lb 0.6 oz)   SpO2 95%   BMI 44.04 kg/m²       Pain/Abbie Score: Pain Rating Prior to Med Admin: 6 (2/21/2019  3:34 AM)  Pain Rating Post Med Admin: 0 (2/20/2019 11:00 PM)        "

## 2019-02-21 NOTE — PLAN OF CARE
Patient has met PACU discharge criteria, VSS, pain well controlled. Family updated by phone. Released from PACU by Dr. Oreilly*

## 2019-02-21 NOTE — PLAN OF CARE
Problem: Adult Inpatient Plan of Care  Goal: Plan of Care Review  Outcome: Ongoing (interventions implemented as appropriate)  Patient resting in bed, AAOx4. Medications administered as ordered. Patient complained of minimal pain overnight, all resolved with scheduled toradol. Minimal drainage noted from incision. Encouraged to call with needs or concerns. Will continue to monitor.

## 2019-02-24 LAB — BACTERIA SPEC AEROBE CULT: NORMAL

## 2019-02-25 LAB
BACTERIA BLD CULT: NORMAL
BACTERIA SPEC ANAEROBE CULT: NORMAL

## 2019-03-08 ENCOUNTER — TELEPHONE (OUTPATIENT)
Dept: RHEUMATOLOGY | Facility: CLINIC | Age: 49
End: 2019-03-08

## 2019-03-08 NOTE — TELEPHONE ENCOUNTER
----- Message from Callie Sullivan sent at 3/8/2019  4:09 PM CST -----  Contact: [phone]    Pt calling to reschedule his appt. Missed on March 7th.    Thank you!

## 2019-03-11 ENCOUNTER — TELEPHONE (OUTPATIENT)
Dept: NEUROLOGY | Facility: HOSPITAL | Age: 49
End: 2019-03-11

## 2019-03-11 NOTE — TELEPHONE ENCOUNTER
----- Message from Roni Arriaza LPN sent at 3/8/2019  4:22 PM CST -----  Pt calling to reschedule his appt. Missed on March 7th.    Thank you!

## 2019-03-20 LAB — FUNGUS SPEC CULT: NORMAL

## 2019-04-03 ENCOUNTER — OFFICE VISIT (OUTPATIENT)
Dept: SLEEP MEDICINE | Facility: CLINIC | Age: 49
End: 2019-04-03
Payer: COMMERCIAL

## 2019-04-03 VITALS
WEIGHT: 315 LBS | BODY MASS INDEX: 40.43 KG/M2 | HEART RATE: 63 BPM | HEIGHT: 74 IN | DIASTOLIC BLOOD PRESSURE: 87 MMHG | SYSTOLIC BLOOD PRESSURE: 156 MMHG

## 2019-04-03 DIAGNOSIS — G47.33 OSA (OBSTRUCTIVE SLEEP APNEA): Primary | ICD-10-CM

## 2019-04-03 PROCEDURE — 99204 PR OFFICE/OUTPT VISIT, NEW, LEVL IV, 45-59 MIN: ICD-10-PCS | Mod: S$GLB,,, | Performed by: PSYCHIATRY & NEUROLOGY

## 2019-04-03 PROCEDURE — 3008F PR BODY MASS INDEX (BMI) DOCUMENTED: ICD-10-PCS | Mod: CPTII,S$GLB,, | Performed by: PSYCHIATRY & NEUROLOGY

## 2019-04-03 PROCEDURE — 3008F BODY MASS INDEX DOCD: CPT | Mod: CPTII,S$GLB,, | Performed by: PSYCHIATRY & NEUROLOGY

## 2019-04-03 PROCEDURE — 99999 PR PBB SHADOW E&M-EST. PATIENT-LVL III: ICD-10-PCS | Mod: PBBFAC,,, | Performed by: PSYCHIATRY & NEUROLOGY

## 2019-04-03 PROCEDURE — 99999 PR PBB SHADOW E&M-EST. PATIENT-LVL III: CPT | Mod: PBBFAC,,, | Performed by: PSYCHIATRY & NEUROLOGY

## 2019-04-03 PROCEDURE — 99204 OFFICE O/P NEW MOD 45 MIN: CPT | Mod: S$GLB,,, | Performed by: PSYCHIATRY & NEUROLOGY

## 2019-04-03 NOTE — PROGRESS NOTES
Nael Bravo  was seen at the request of  Self, Aaareferral for sleep evaluation.    04/03/2019 INITIAL HISTORY OF PRESENT ILLNESS:  Nael Bravo is a 48 y.o. male is here to be evaluated for a sleep disorder.       CHIEF COMPLAINT:      The patient's complaints include excessive daytime sleepiness, excessive daytime fatigue, snoring,  witnessed breathing pauses,  gasping for air in sleep and interrupted sleep since  Many years ago.  He was diagnosed with  severe DURGA around  2006 ([facility]).    Trying to loose weight.    On his 2nd machine now - APAP 12.5- 20 for   90% 14  Leak 5%  7 hrs  100%  AHI 0    Benefiting from CPAP use in terms of sleep continuity and daytime sleepiness.   Needs new supplies and the machine (lasst machine was purchased for cash refurbished)      EPWORTH SLEEPINESS SCALE 4/3/2019   Sitting and reading 0   Watching TV 0   Sitting, inactive in a public place (e.g. a theatre or a meeting) 0   As a passenger in a car for an hour without a break 0   Lying down to rest in the afternoon when circumstances permit 1   Sitting and talking to someone 0   Sitting quietly after a lunch without alcohol 0   In a car, while stopped for a few minutes in traffic 0   Total score 1       No flowsheet data found.  No flowsheet data found.      SLEEP ROUTINE AND LIFESTYLE 04/03/2019 :  Sleep Clinic New Patient 4/3/2019   What time do you go to bed on a week day? (Give a range) 10pm-11pm   What time do you go to bed on a day off? (Give a range) 10pm-11pm   How long does it take you to fall asleep? (Give a range) 10mins   On average, how many times per night do you wake up? Twice (much more without CPAp)   How long does it take you to fall back into sleep? (Give a range) varies   What time do you wake up to start your day on a week day? (Give a range) 7am-730am   What time do you wake up to start your day on a day off? (Give a range) 8am   What time do you get out of bed? (Give a range) as soon as  wake up   On average, how many hours do you sleep? 7-8   On average, how many naps do you take per day? none   Rate your sleep quality from 0 to 5 (0-poor, 5-great). 3   Have you experienced:  Weight gain?   How much weight have you lost or gained (in lbs.) in the last year? 20-25lbs   On average, how many times per night do you go to the bathroom?  0-1   Have you ever had a sleep study/CPAP machine/surgery for sleep apnea? Yes   Have you ever had a CPAP machine for sleep apnea? Yes   Have you ever had surgery for sleep apnea? No       Sleep Clinic ROS  4/3/2019   Difficulty breathing through the nose?  Yes   Sore throat or dry mouth in the morning? Yes   Irregular or very fast heart beat?  No   Shortness of breath?  Sometimes   Acid reflux? Yes   Body aches and pains?  Yes   Morning headaches? Sometimes   Dizziness? Sometimes   Mood changes?  No   Do you exercise?  Sometimes   Do you feel like moving your legs a lot?  No          Denies symptoms concerning for parasomnia            Social History:      Occupation: job involves travelling  Bed partner: his wife Lisa  Exercise routine:trying       PREVIOUS SLEEP STUDIES:     PSG 2010: PSG 2010: Significant Obstructive sleep apnea (DURGA) with AHI (apnea hypopnea Index) of 32 and SaO2 of 79% (weight  245). 8 cm H2O - he increasded to 12        DME:       PAST MEDICAL HISTORY:    Active Ambulatory Problems     Diagnosis Date Noted    No Active Ambulatory Problems     Resolved Ambulatory Problems     Diagnosis Date Noted    Perirectal abscess      Past Medical History:   Diagnosis Date    Perirectal abscess     Perirectal abscess                 PAST SURGICAL HISTORY:    Past Surgical History:   Procedure Laterality Date    APPENDECTOMY      INCISION AND DRAINAGE, PERIRECTAL REGION Left 2/20/2019    [facility]    SINUS SURGERY  2006         FAMILY HISTORY:                No family history on file.    SOCIAL HISTORY:         "  Tobacco:   Social History     Tobacco Use   Smoking Status Never Smoker   Smokeless Tobacco Never Used       alcohol use:    Social History     Substance and Sexual Activity   Alcohol Use No    Frequency: Never                   ALLERGIES:  Review of patient's allergies indicates:  No Known Allergies    CURRENT MEDICATIONS:    Current Outpatient Medications   Medication Sig Dispense Refill    pantoprazole sodium (PROTONIX ORAL) TAKE 1 TABLET BY MOUTH DAILY      venlafaxine (EFFEXOR) 25 MG Tab Take by mouth every evening.        No current facility-administered medications for this visit.                           PHYSICAL EXAM:  BP (!) 156/87   Pulse 63   Ht 6' 2" (1.88 m)   Wt (!) 151.5 kg (334 lb)   BMI 42.88 kg/m²   GENERAL: Normal development, well groomed.  HEENT:   HEENT:  Conjunctivae are non-erythematous; Pupils equal, round, and reactive to light; Nose is symmetrical; Nasal mucosa is pink and moist; Septum is midline; Inferior turbinates are normal; Nasal airflow is normal; Posterior pharynx is pink; Modified Mallampati:III-IV; Posterior palate is low; Tonsils not visualized; Uvula is normal and pink;Tongue is enlarged; Dentition is fair; No TMJ tenderness; Jaw opening and protrusion without click and without discomfort.  NECK: Supple. Neck circumference is 19 inches. No thyromegaly. No palpable nodes.     SKIN: On face and neck: No abrasions, no rashes, no lesions.  No subcutaneous nodules are palpable.  RESPIRATORY: Chest is clear to auscultation.  Normal chest expansion and non-labored breathing at rest.  CARDIOVASCULAR: Normal S1, S2.  No murmurs, gallops or rubs. No carotid bruits bilaterally.  No edema. No clubbing. No cyanosis.    NEURO: Oriented to time, place and person. Normal attention span and concentration. Gait normal.    PSYCH: Affect is full. Mood is normal  MUSCULOSKELETAL: Moves 4 extremities. Gait normal.         Using [facility]:       ASSESSMENT:    1. Sleep Apnea NEC. The " "patient symptomatically has  excessive daytime sleepiness, snoring,  witnessed breathing pauses, excessive daytime fatigue, gasping for air in sleep, interrupted sleep and nocturia  with exam findings of "a crowded oral airway and elevated body mass index. The patient has medical co-morbidities of chronic nasal congestion,  which can be worsened by DURGA. This warrants further investigation for possible obstructive sleep apnea. Machine is due for replacement.          PLAN:      Treatment: prescription  for CPAP 12.5-20 cm with the mask of a patient's choice was given to the patient.    Education: During our discussion today, we talked about the etiology of obstructive sleep apnea as well as the potential ramifications of untreated sleep apnea, which could include daytime sleepiness, hypertension, heart disease and/or stroke.      We discussed potential treatment options, which could include weight loss, body positioning, continuous positive airway pressure (CPAP), or referral for surgical consideration. The patient preferred CPAP option.     Discussed purpose of PAP therapy, health benefits of CPAP, as well as the potential ramifications of untreated sleep apnea, which could include daytime sleepiness, hypertension, heart disease and/or stroke. An AHI of 15 is associated with increased risk CVD.     The patient should avoid ETOH and sedatives at night, as it tends to aggravate DURGA. Regular replacement of CPAP mask, tubing and filter was recommended.    Precautions: The patient was advised to abstain from driving should he feel sleepy or drowsy.    Follow up: MD/NP after 1 month of PAP use.    Thank you for allowing me the opportunity to participate in the care of your patient.          "

## 2019-04-03 NOTE — PATIENT INSTRUCTIONS
Nav[employer]ge Nasal Irrigation Basic Bundle: Nav[employer]ge Nose  and 18 SaltPods, plus New User Starter Gift of 10 SaltPods   4.1 out of 5 stars 1,118     Other ideas from [employer]:    - CPAP comfort cover for FFM    ----------------------------------------------------\\\\\    durable medical equipment  DME [facility]:  [phone] - [employer] - best number

## 2019-04-15 ENCOUNTER — TELEPHONE (OUTPATIENT)
Dept: SLEEP MEDICINE | Facility: CLINIC | Age: 49
End: 2019-04-15

## 2019-04-15 NOTE — TELEPHONE ENCOUNTER
Jarrett Reeder,      I receive pt sleep study results and uploaded it to Epic in media. Let me know if we are good to go with the cpap supplies for pt. Nael Bravo (MRN:[id number])

## 2019-04-15 NOTE — TELEPHONE ENCOUNTER
So I called [facility] today and Crissy told me Lilli was handling the order and that it needed a PA. I message Lilli to inform her its been uploaded and it was also faxed. I waiting on a response from her to let me know if the PA was approved.

## 2019-04-15 NOTE — TELEPHONE ENCOUNTER
I just message Lilli to inform her its been uploaded and it was faxed also. I waiting on a response to let me know if the PA was approved.

## 2019-04-15 NOTE — TELEPHONE ENCOUNTER
----- Message from Litzy Jarvis MD sent at 4/11/2019  8:20 PM CDT -----  Are we all good now to provide CPAP supplies to Mr. Bravo?    Thanks!  ----- Message -----  From: Lilli Hodgson  Sent: 4/8/2019   1:31 PM  To: Litzy Jarvis MD    We received the order for a new cpap. We still need a copy of the diagnostic sleep study. Please fax us a copy. Thanks

## 2019-04-25 LAB
ACID FAST MOD KINY STN SPEC: NORMAL
MYCOBACTERIUM SPEC QL CULT: NORMAL

## 2019-08-16 ENCOUNTER — OFFICE VISIT (OUTPATIENT)
Dept: PRIMARY CARE CLINIC | Facility: CLINIC | Age: 49
End: 2019-08-16
Payer: COMMERCIAL

## 2019-08-16 VITALS
SYSTOLIC BLOOD PRESSURE: 142 MMHG | TEMPERATURE: 98 F | HEIGHT: 74 IN | HEART RATE: 49 BPM | DIASTOLIC BLOOD PRESSURE: 80 MMHG | BODY MASS INDEX: 40.43 KG/M2 | OXYGEN SATURATION: 94 % | WEIGHT: 315 LBS

## 2019-08-16 DIAGNOSIS — G47.33 OSA (OBSTRUCTIVE SLEEP APNEA): ICD-10-CM

## 2019-08-16 DIAGNOSIS — N62 GYNECOMASTIA, MALE: ICD-10-CM

## 2019-08-16 DIAGNOSIS — F33.0 MILD RECURRENT MAJOR DEPRESSION: ICD-10-CM

## 2019-08-16 DIAGNOSIS — Z00.00 ANNUAL PHYSICAL EXAM: ICD-10-CM

## 2019-08-16 DIAGNOSIS — K21.9 GERD WITHOUT ESOPHAGITIS: ICD-10-CM

## 2019-08-16 DIAGNOSIS — I10 ESSENTIAL HYPERTENSION: Primary | ICD-10-CM

## 2019-08-16 PROCEDURE — 99999 PR PBB SHADOW E&M-EST. PATIENT-LVL III: CPT | Mod: PBBFAC,,, | Performed by: INTERNAL MEDICINE

## 2019-08-16 PROCEDURE — 99214 PR OFFICE/OUTPT VISIT, EST, LEVL IV, 30-39 MIN: ICD-10-PCS | Mod: S$GLB,,, | Performed by: INTERNAL MEDICINE

## 2019-08-16 PROCEDURE — 99214 OFFICE O/P EST MOD 30 MIN: CPT | Mod: S$GLB,,, | Performed by: INTERNAL MEDICINE

## 2019-08-16 PROCEDURE — 3008F BODY MASS INDEX DOCD: CPT | Mod: CPTII,S$GLB,, | Performed by: INTERNAL MEDICINE

## 2019-08-16 PROCEDURE — 3008F PR BODY MASS INDEX (BMI) DOCUMENTED: ICD-10-PCS | Mod: CPTII,S$GLB,, | Performed by: INTERNAL MEDICINE

## 2019-08-16 PROCEDURE — 99999 PR PBB SHADOW E&M-EST. PATIENT-LVL III: ICD-10-PCS | Mod: PBBFAC,,, | Performed by: INTERNAL MEDICINE

## 2019-08-16 RX ORDER — LOSARTAN POTASSIUM AND HYDROCHLOROTHIAZIDE 12.5; 5 MG/1; MG/1
1 TABLET ORAL DAILY
Qty: 90 TABLET | Refills: 3 | Status: SHIPPED | OUTPATIENT
Start: 2019-08-16 | End: 2020-09-17

## 2019-08-16 RX ORDER — MONTELUKAST SODIUM 10 MG/1
1 TABLET ORAL NIGHTLY
COMMUNITY
Start: 2016-10-05 | End: 2019-11-22 | Stop reason: SDUPTHER

## 2019-08-16 RX ORDER — AZELASTINE 1 MG/ML
1 SPRAY, METERED NASAL 2 TIMES DAILY PRN
COMMUNITY
End: 2020-11-30

## 2019-08-16 RX ORDER — FLUTICASONE PROPIONATE 50 MCG
2 SPRAY, SUSPENSION (ML) NASAL DAILY
COMMUNITY
End: 2020-11-30

## 2019-08-16 RX ORDER — PANTOPRAZOLE SODIUM 40 MG/1
40 TABLET, DELAYED RELEASE ORAL DAILY
Refills: 3 | COMMUNITY
Start: 2019-07-20 | End: 2020-05-28

## 2019-08-16 RX ORDER — LISINOPRIL AND HYDROCHLOROTHIAZIDE 10; 12.5 MG/1; MG/1
1 TABLET ORAL DAILY
Refills: 3 | COMMUNITY
Start: 2019-07-30 | End: 2019-08-16

## 2019-08-16 NOTE — PROGRESS NOTES
[facility] Note    Chief Complaint      Chief Complaint   Patient presents with    Follow-up    Hypertension     asking for change of meds cause him to cough    Labs     needs bloodwork       History of Present Illness      Nael Bravo is a 48 y.o. male with chronic conditions of HTN, DURGA, depression, GERD who presents today for: re-establish care and review chronic conditions. Complains of right breast tissue enlargement in the past 5 weeks, superior subareola    HTN: BP elevated without meds.  Stopped lisinopril-hctz 2/2 dry cough.  Improved after discontinuing.  Will change to losartan and recheck in 2-3 weeks.  DURGA: Sees Dr. Jarvis, Dr. Jimenez.  On CPAP.  Setting titrated 4/2019.    Depression: Controlled on venlafaxine.  No new or worsening symptoms.  GERD: Controlled without medications.  Doing well with diet control.    Past Medical History:  Past Medical History:   Diagnosis Date    Perirectal abscess     Perirectal abscess        Past Surgical History:   has a past surgical history that includes Appendectomy; Sinus surgery (2006); and Incision and drainage of perirectal region (Left, 2/20/2019).    Family History:  family history is not on file.     Social History:  Social History     Tobacco Use    Smoking status: Never Smoker    Smokeless tobacco: Never Used   Substance Use Topics    Alcohol use: No     Frequency: Never    Drug use: No       Review of Systems   Constitutional: Negative for chills, fever and malaise/fatigue.   Respiratory: Negative for shortness of breath.    Cardiovascular: Negative for chest pain.   Gastrointestinal: Negative for constipation, diarrhea, nausea and vomiting.   Skin: Negative for rash.   Neurological: Negative for weakness.        Medications:  Outpatient Encounter Medications as of 8/16/2019   Medication Sig Dispense Refill    fluticasone propionate (FLONASE) 50 mcg/actuation nasal spray 2 sprays by Each Nostril route once daily.       "montelukast (SINGULAIR) 10 mg tablet Take 1 tablet by mouth every evening.      pantoprazole (PROTONIX) 40 MG tablet Take 40 mg by mouth once daily.  3    venlafaxine (EFFEXOR) 25 MG Tab Take by mouth every evening.       azelastine (ASTELIN) 137 mcg (0.1 %) nasal spray 1 spray by Nasal route 2 (two) times daily as needed.      [DISCONTINUED] lisinopril-hydrochlorothiazide (PRINZIDE,ZESTORETIC) 10-12.5 mg per tablet Take 1 tablet by mouth once daily.  3    [DISCONTINUED] pantoprazole sodium (PROTONIX ORAL) TAKE 1 TABLET BY MOUTH DAILY       No facility-administered encounter medications on file as of 8/16/2019.        Allergies:  Review of patient's allergies indicates:  No Known Allergies    Health Maintenance:  Immunization History   Administered Date(s) Administered    Influenza - Trivalent (ADULT) 03/08/2016      Health Maintenance   Topic Date Due    Lipid Panel  1968    TETANUS VACCINE  11/17/1986    Colonoscopy  11/17/2018      FLu shot UTD.  TdAP 2005.  Defers today.  Cscope Due with Dr. Craig    Physical Exam      Vital Signs  Temp: 98.1 °F (36.7 °C)  Pulse: (!) 49  SpO2: (!) 94 %  BP: (!) 142/80  BP Location: Right arm  Patient Position: Sitting  Height and Weight  Height: 6' 2" (188 cm)  Weight: (!) 152.7 kg (336 lb 10.3 oz)  BSA (Calculated - sq m): 2.82 sq meters  BMI (Calculated): 43.3  Weight in (lb) to have BMI = 25: 194.3]    Physical Exam   Constitutional: He is well-developed, well-nourished, and in no distress.   HENT:   Head: Normocephalic and atraumatic.   Right Ear: External ear normal.   Left Ear: External ear normal.   Mouth/Throat: Oropharynx is clear and moist.   Eyes: Pupils are equal, round, and reactive to light. Conjunctivae and EOM are normal.   Neck: Carotid bruit is not present.   Cardiovascular: Normal rate, regular rhythm, normal heart sounds and intact distal pulses.   No murmur heard.  Pulmonary/Chest: Effort normal and breath sounds normal. He has no wheezes. " He has no rales. He exhibits no tenderness.   Abdominal: Soft. Bowel sounds are normal. He exhibits no distension. There is no hepatosplenomegaly. There is no tenderness.   Vitals reviewed.       Laboratory:  CBC:  Recent Labs   Lab 02/20/19  1022   WBC 8.73   RBC 5.11   Hemoglobin 14.3   Hematocrit 45.0   Platelets 273   Mean Corpuscular Volume 88   Mean Corpuscular Hemoglobin 28.0   Mean Corpuscular Hemoglobin Conc 31.8 L     CMP:  Recent Labs   Lab 02/20/19  1022   Glucose 115 H   Calcium 9.7   Albumin 3.9   Total Protein 7.3   Sodium 141   Potassium 4.3   CO2 23   Chloride 108   BUN, Bld 13   Alkaline Phosphatase 90   ALT 50 H   AST 24   Total Bilirubin 0.5     URINALYSIS:       LIPIDS:      TSH:      A1C:        Assessment/Plan     Nael Bravo is a 48 y.o.male with:    1. Essential hypertension, uncontrolled  - losartan-hydrochlorothiazide 50-12.5 mg (HYZAAR) 50-12.5 mg per tablet; Take 1 tablet by mouth once daily.  Dispense: 90 tablet; Refill: 3  - CBC auto differential; Future  - Comprehensive metabolic panel; Future  - Lipid panel; Future  - TSH; Future  - T4, free; Future  Start losartan-hctz.  Update labs.  Recheck BP in 2-3 weeks.    2. Mild recurrent major depression, stable  Cont current meds.    3. GERD without esophagitis, stable  Cont current meds.  Doing well  4. DURGA (obstructive sleep apnea), stable  Cont current meds.  F/U with speicalists as scheduled.  5. Gynecomastia, male, new  - Mammo Digital Diagnostic Right; Future  - US Breast Right Limited; Future    6. Annual physical exam  - CBC auto differential; Future  - Comprehensive metabolic panel; Future  - Lipid panel; Future  - TSH; Future  - T4, free; Future  - PSA, Screening; Future       Chronic conditions status updated as per HPI.  Other than changes above, cont current medications and maintain follow up with specialists.  Return to clinic in 6 months.    Elpidio Robertson MD  [facility]

## 2019-09-10 ENCOUNTER — TELEPHONE (OUTPATIENT)
Dept: RADIOLOGY | Facility: HOSPITAL | Age: 49
End: 2019-09-10

## 2019-09-12 ENCOUNTER — TELEPHONE (OUTPATIENT)
Dept: RADIOLOGY | Facility: HOSPITAL | Age: 49
End: 2019-09-12

## 2019-09-20 ENCOUNTER — HOSPITAL ENCOUNTER (OUTPATIENT)
Dept: RADIOLOGY | Facility: HOSPITAL | Age: 49
Discharge: HOME | End: 2019-09-20
Attending: INTERNAL MEDICINE
Payer: COMMERCIAL

## 2019-09-20 DIAGNOSIS — N62 GYNECOMASTIA, MALE: ICD-10-CM

## 2019-09-20 PROCEDURE — 77062 BREAST TOMOSYNTHESIS BI: CPT | Mod: 26,,, | Performed by: RADIOLOGY

## 2019-09-20 PROCEDURE — 77066 MAMMO DIGITAL DIAGNOSTIC BILAT WITH TOMOSYNTHESIS_CAD: ICD-10-PCS | Mod: 26,,, | Performed by: RADIOLOGY

## 2019-09-20 PROCEDURE — 77062 BREAST TOMOSYNTHESIS BI: CPT | Mod: TC

## 2019-09-20 PROCEDURE — 77066 DX MAMMO INCL CAD BI: CPT | Mod: 26,,, | Performed by: RADIOLOGY

## 2019-09-20 PROCEDURE — 77062 MAMMO DIGITAL DIAGNOSTIC BILAT WITH TOMOSYNTHESIS_CAD: ICD-10-PCS | Mod: 26,,, | Performed by: RADIOLOGY

## 2019-09-23 DIAGNOSIS — N62 GYNECOMASTIA, MALE: Primary | ICD-10-CM

## 2019-09-23 NOTE — PROGRESS NOTES
Mammogram shows benign enlargement of breast tissue on the right side.  Recommend adding additional labs to blood work orders to further evaluate.  I don't see any medications that might be causing this.

## 2019-11-22 RX ORDER — MONTELUKAST SODIUM 10 MG/1
TABLET ORAL
Qty: 90 TABLET | Refills: 3 | Status: SHIPPED | OUTPATIENT
Start: 2019-11-22 | End: 2020-12-22

## 2019-12-02 ENCOUNTER — TELEPHONE (OUTPATIENT)
Dept: SLEEP MEDICINE | Facility: CLINIC | Age: 49
End: 2019-12-02

## 2019-12-02 DIAGNOSIS — G47.00 INSOMNIA, UNSPECIFIED TYPE: Primary | ICD-10-CM

## 2019-12-02 DIAGNOSIS — G47.00 INSOMNIA, UNSPECIFIED TYPE: ICD-10-CM

## 2019-12-02 RX ORDER — ALPRAZOLAM 0.5 MG/1
TABLET ORAL
Qty: 10 TABLET | Refills: 0 | Status: SHIPPED | OUTPATIENT
Start: 2019-12-02 | End: 2019-12-02 | Stop reason: SDUPTHER

## 2019-12-02 RX ORDER — ALPRAZOLAM 0.5 MG/1
TABLET ORAL
Qty: 10 TABLET | Refills: 0 | Status: SHIPPED | OUTPATIENT
Start: 2019-12-02 | End: 2020-06-13 | Stop reason: SDUPTHER

## 2019-12-02 NOTE — TELEPHONE ENCOUNTER
Talked to the patient, reports situational /adjustment anxiety/insomnia.  Will provide 10 tablets Xanax PRN.

## 2019-12-20 ENCOUNTER — TELEPHONE (OUTPATIENT)
Dept: SLEEP MEDICINE | Facility: CLINIC | Age: 49
End: 2019-12-20

## 2019-12-20 DIAGNOSIS — R09.81 NASAL CONGESTION: Primary | ICD-10-CM

## 2019-12-20 DIAGNOSIS — G47.00 INSOMNIA, UNSPECIFIED TYPE: ICD-10-CM

## 2019-12-20 RX ORDER — HYDROXYZINE HYDROCHLORIDE 50 MG/1
TABLET, FILM COATED ORAL
Qty: 30 TABLET | Refills: 0 | Status: SHIPPED | OUTPATIENT
Start: 2019-12-20 | End: 2020-11-30

## 2020-05-08 DIAGNOSIS — Z12.11 COLON CANCER SCREENING: ICD-10-CM

## 2020-05-14 RX ORDER — VENLAFAXINE 25 MG/1
TABLET ORAL
Qty: 90 TABLET | Refills: 3 | Status: SHIPPED | OUTPATIENT
Start: 2020-05-14 | End: 2021-06-05

## 2020-05-28 RX ORDER — PANTOPRAZOLE SODIUM 40 MG/1
TABLET, DELAYED RELEASE ORAL
Qty: 90 TABLET | Refills: 3 | Status: SHIPPED | OUTPATIENT
Start: 2020-05-28 | End: 2021-06-03

## 2020-06-13 ENCOUNTER — TELEPHONE (OUTPATIENT)
Dept: SLEEP MEDICINE | Facility: CLINIC | Age: 50
End: 2020-06-13

## 2020-06-13 DIAGNOSIS — G47.00 INSOMNIA, UNSPECIFIED TYPE: ICD-10-CM

## 2020-06-13 DIAGNOSIS — F41.9 ANXIETY: Primary | ICD-10-CM

## 2020-06-13 RX ORDER — ALPRAZOLAM 0.5 MG/1
TABLET ORAL
Qty: 21 TABLET | Refills: 0 | Status: SHIPPED | OUTPATIENT
Start: 2020-06-13 | End: 2020-08-07 | Stop reason: SDUPTHER
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
## 2020-08-07 ENCOUNTER — TELEPHONE (OUTPATIENT)
Dept: SLEEP MEDICINE | Facility: CLINIC | Age: 50
End: 2020-08-07

## 2020-08-07 DIAGNOSIS — G47.00 INSOMNIA, UNSPECIFIED TYPE: ICD-10-CM

## 2020-08-07 DIAGNOSIS — F41.9 ANXIETY: ICD-10-CM

## 2020-08-07 RX ORDER — ALPRAZOLAM 0.5 MG/1
TABLET ORAL
Qty: 21 TABLET | Refills: 0 | Status: CANCELLED | OUTPATIENT
Start: 2020-08-07

## 2020-08-07 RX ORDER — ALPRAZOLAM 0.5 MG/1
TABLET ORAL
Qty: 30 TABLET | Refills: 0 | Status: SHIPPED | OUTPATIENT
Start: 2020-08-07 | End: 2020-11-30

## 2020-11-11 ENCOUNTER — TELEPHONE (OUTPATIENT)
Dept: SLEEP MEDICINE | Facility: CLINIC | Age: 50
End: 2020-11-11

## 2020-11-11 NOTE — TELEPHONE ENCOUNTER
Visit Type: ESTABLISHED PATIENT - VIRTUAL ([id number])      11/18/2020   2:30 PM  30 mins.  Litzy Jarvis MD      [facility]      Patient Comments:   Cpap review, pressure analysis

## 2020-11-16 ENCOUNTER — PATIENT MESSAGE (OUTPATIENT)
Dept: FAMILY MEDICINE | Facility: CLINIC | Age: 50
End: 2020-11-16

## 2020-11-16 ENCOUNTER — OFFICE VISIT (OUTPATIENT)
Dept: PRIMARY CARE CLINIC | Facility: CLINIC | Age: 50
End: 2020-11-16
Payer: COMMERCIAL

## 2020-11-16 ENCOUNTER — PATIENT OUTREACH (OUTPATIENT)
Dept: ADMINISTRATIVE | Facility: OTHER | Age: 50
End: 2020-11-16

## 2020-11-16 DIAGNOSIS — Z11.4 SCREENING FOR HIV (HUMAN IMMUNODEFICIENCY VIRUS): ICD-10-CM

## 2020-11-16 DIAGNOSIS — Z12.11 SCREEN FOR COLON CANCER: ICD-10-CM

## 2020-11-16 DIAGNOSIS — Z11.59 SCREENING FOR VIRAL DISEASE: ICD-10-CM

## 2020-11-16 DIAGNOSIS — Z00.00 ANNUAL PHYSICAL EXAM: Primary | ICD-10-CM

## 2020-11-16 DIAGNOSIS — R73.01 IMPAIRED FASTING GLUCOSE: ICD-10-CM

## 2020-11-16 DIAGNOSIS — I10 ESSENTIAL HYPERTENSION: ICD-10-CM

## 2020-11-16 DIAGNOSIS — K21.9 GERD WITHOUT ESOPHAGITIS: ICD-10-CM

## 2020-11-16 DIAGNOSIS — G47.33 OSA (OBSTRUCTIVE SLEEP APNEA): ICD-10-CM

## 2020-11-16 PROCEDURE — 99214 PR OFFICE/OUTPT VISIT, EST, LEVL IV, 30-39 MIN: ICD-10-PCS | Mod: 95,,, | Performed by: INTERNAL MEDICINE

## 2020-11-16 PROCEDURE — 99214 OFFICE O/P EST MOD 30 MIN: CPT | Mod: 95,,, | Performed by: INTERNAL MEDICINE

## 2020-11-16 NOTE — PROGRESS NOTES
[facility] Virtual Visit Note    The patient location is: [place]  The chief complaint leading to consultation is: polyuria and polydipsia  Visit type: Virtual visit with synchronous audio and video  Total time spent with patient: 20 min  Each patient to whom he or she provides medical services by telemedicine is:  (1) informed of the relationship between the physician and patient and the respective role of any other health care provider with respect to management of the patient; and (2) notified that he or she may decline to receive medical services by telemedicine and may withdraw from such care at any time.      Chief Complaint      Chief Complaint   Patient presents with    Follow-up       History of Present Illness      Nael Bravo is a 49 y.o. male with chronic conditions of HTN, DURGA, depression, GERD who presents today for: follow up chronic conditions and complaints of polydispia, polyuria.  Has been having a lot of stress at home and not controlling diet.  Has gained weight.  HTN: BP elevated with home monitoring on losartan-hctz.  Will start with two tablets.  DURGA: Sees Dr. Jarvis, Dr. Jimenez.  On CPAP.  Setting titrated 4/2019.    Depression: Controlled on venlafaxine.  No new or worsening symptoms.  GERD: Controlled without medications.  Doing well with diet control.  FLu shot due.  TdAP 2005, due  Cscope due with Dr. Craig    Past Medical History:  Past Medical History:   Diagnosis Date    Perirectal abscess     Perirectal abscess        Past Surgical History:   has a past surgical history that includes Appendectomy; Sinus surgery (2006); and Incision and drainage of perirectal region (Left, 2/20/2019).    Family History:  family history includes Breast cancer in his maternal grandmother and paternal grandmother.     Social History:  Social History     Tobacco Use    Smoking status: Never Smoker    Smokeless tobacco: Never Used   Substance Use Topics    Alcohol use: No      Frequency: Never    Drug use: No       Review of Systems   Constitutional: Negative for chills, fever and malaise/fatigue.   HENT: Negative for hearing loss.    Eyes: Negative for discharge.   Respiratory: Negative for shortness of breath and wheezing.    Cardiovascular: Negative for chest pain and palpitations.   Gastrointestinal: Negative for constipation, diarrhea, nausea and vomiting.   Genitourinary: Negative for hematuria.   Skin: Negative for rash.   Neurological: Negative for weakness and headaches.   Endo/Heme/Allergies: Positive for polydipsia.   All other systems reviewed and are negative.       Medications:  Outpatient Encounter Medications as of 11/16/2020   Medication Sig Dispense Refill    ALPRAZolam (XANAX) 0.5 MG tablet 1 pill PO as needed for anxiety 30 tablet 0    azelastine (ASTELIN) 137 mcg (0.1 %) nasal spray 1 spray by Nasal route 2 (two) times daily as needed.      fluticasone propionate (FLONASE) 50 mcg/actuation nasal spray 2 sprays by Each Nostril route once daily.      hydrOXYzine (ATARAX) 50 MG tablet 1 pill PO as needed at bedtime for insomnia 30 tablet 0    losartan-hydrochlorothiazide 50-12.5 mg (HYZAAR) 50-12.5 mg per tablet Take 1 tablet by mouth once daily. 90 tablet 0    montelukast (SINGULAIR) 10 mg tablet Take 1 tablet(s) every day by oral route at bedtime for 30 days. 90 tablet 3    pantoprazole (PROTONIX) 40 MG tablet TAKE 1 TABLET BY MOUTH DAILY 90 tablet 3    venlafaxine (EFFEXOR) 25 MG Tab TAKE 1 TABLET BY MOUTH DAILY 90 tablet 3     No facility-administered encounter medications on file as of 11/16/2020.        Allergies:  Review of patient's allergies indicates:  No Known Allergies    Health Maintenance:  Immunization History   Administered Date(s) Administered    Influenza - Trivalent (ADULT) 03/08/2016      Health Maintenance   Topic Date Due    Hepatitis C Screening  1970    Lipid Panel  1970    TETANUS VACCINE  11/17/1986        Physical Exam        ]    Physical Exam  Constitutional:       General: He is not in acute distress.     Appearance: He is well-developed. He is not diaphoretic.   HENT:      Head: Normocephalic and atraumatic.   Eyes:      General: No scleral icterus.        Right eye: No discharge.         Left eye: No discharge.      Conjunctiva/sclera: Conjunctivae normal.   Pulmonary:      Effort: Pulmonary effort is normal. No respiratory distress.   Neurological:      Mental Status: He is alert and oriented to person, place, and time.   Psychiatric:         Behavior: Behavior normal.         Thought Content: Thought content normal.         Judgment: Judgment normal.          Laboratory:  CBC:  Recent Labs   Lab 02/20/19  1022   WBC 8.73   RBC 5.11   Hemoglobin 14.3   Hematocrit 45.0   Platelets 273   MCV 88   MCH 28.0   MCHC 31.8 L     CMP:  Recent Labs   Lab 02/20/19  1022   Glucose 115 H   Calcium 9.7   Albumin 3.9   Total Protein 7.3   Sodium 141   Potassium 4.3   CO2 23   Chloride 108   BUN 13   Alkaline Phosphatase 90   ALT 50 H   AST 24   Total Bilirubin 0.5     URINALYSIS:       LIPIDS:      TSH:      A1C:        Assessment/Plan     Nael Bravo is a 49 y.o.male with:    1. Essential hypertension  Continue current meds, increase to 2 tablets  2. DURGA (obstructive sleep apnea)  Cont CPAP  3. GERD without esophagitis  Continue current meds.    4. Impaired fasting glucose  - Hemoglobin A1C; Future    5. Annual physical exam  - CBC Auto Differential; Future  - Comprehensive Metabolic Panel; Future  - Hepatitis C Antibody; Future  - HIV 1/2 Ag/Ab (4th Gen); Future  - Lipid Panel; Future  - TSH; Future  - PSA, Screening; Future  - T4, Free; Future  - Hemoglobin A1C; Future  - Ambulatory referral/consult to Gastroenterology; Future    6. Screening for viral disease  - Hepatitis C Antibody; Future    7. Screening for HIV (human immunodeficiency virus)  - HIV 1/2 Ag/Ab (4th Gen); Future    8. Screen for colon cancer  - Ambulatory  referral/consult to Gastroenterology; Future      Chronic conditions status updated as per HPI.  Other than changes above, cont current medications and maintain follow up with specialists.  Return to clinic as scheduled.    Elpidio Robertson MD  [facility]                Answers for HPI/ROS submitted by the patient on 11/16/2020   activity change: Yes  trouble swallowing: No  visual disturbance: Yes  chest tightness: No  polyuria: Yes  difficulty urinating: No  dysphoric mood: No

## 2020-11-16 NOTE — Clinical Note
Please schedule labs to be drawn at [place].  Please give referral number for [facility] for cscope.

## 2020-11-17 NOTE — PROGRESS NOTES
Health Maintenance Due   Topic Date Due    Hepatitis C Screening  1970    Lipid Panel  1970    HIV Screening  11/17/1983    TETANUS VACCINE  11/17/1986    Shingles Vaccine (1 of 2) 11/17/2018    Colorectal Cancer Screening  11/17/2018    Influenza Vaccine (1) 08/01/2020     Updates were requested from care everywhere.  Chart was reviewed for overdue [facility]active [facility] (AYE) topics (CRS, Breast Cancer Screening, Eye exam)  Health Maintenance has been updated.  LINKS immunization registry triggered.  Immunizations were reconciled.

## 2020-11-18 ENCOUNTER — TELEPHONE (OUTPATIENT)
Dept: SLEEP MEDICINE | Facility: CLINIC | Age: 50
End: 2020-11-18

## 2020-11-18 NOTE — TELEPHONE ENCOUNTER
Visit Type: ESTABLISHED PATIENT - VIRTUAL   Date        Time      Length    Provider                  Department   11/18/2020   2:30 PM  30 mins.  Litzy Jarvis MD      [facility]      Reason for Cancellation: Appt Time No Longer Works

## 2020-11-24 ENCOUNTER — LAB VISIT (OUTPATIENT)
Dept: LAB | Facility: HOSPITAL | Age: 50
End: 2020-11-24
Attending: INTERNAL MEDICINE
Payer: COMMERCIAL

## 2020-11-24 DIAGNOSIS — R73.01 IMPAIRED FASTING GLUCOSE: ICD-10-CM

## 2020-11-24 DIAGNOSIS — Z11.4 SCREENING FOR HIV (HUMAN IMMUNODEFICIENCY VIRUS): ICD-10-CM

## 2020-11-24 DIAGNOSIS — Z00.00 ANNUAL PHYSICAL EXAM: ICD-10-CM

## 2020-11-24 DIAGNOSIS — Z11.59 SCREENING FOR VIRAL DISEASE: ICD-10-CM

## 2020-11-24 LAB
ALBUMIN SERPL BCP-MCNC: 4.1 G/DL (ref 3.5–5.2)
ALP SERPL-CCNC: 103 U/L (ref 55–135)
ALT SERPL W/O P-5'-P-CCNC: 61 U/L (ref 10–44)
ANION GAP SERPL CALC-SCNC: 22 MMOL/L (ref 8–16)
AST SERPL-CCNC: 64 U/L (ref 10–40)
BASOPHILS # BLD AUTO: 0.11 K/UL (ref 0–0.2)
BASOPHILS NFR BLD: 1.3 % (ref 0–1.9)
BILIRUB SERPL-MCNC: 0.6 MG/DL (ref 0.1–1)
BUN SERPL-MCNC: 16 MG/DL (ref 6–20)
CALCIUM SERPL-MCNC: 9.6 MG/DL (ref 8.7–10.5)
CHLORIDE SERPL-SCNC: 91 MMOL/L (ref 95–110)
CHOLEST SERPL-MCNC: 397 MG/DL (ref 120–199)
CHOLEST/HDLC SERPL: 19.9 {RATIO} (ref 2–5)
CO2 SERPL-SCNC: 14 MMOL/L (ref 23–29)
COMPLEXED PSA SERPL-MCNC: 0.68 NG/ML (ref 0–4)
CREAT SERPL-MCNC: 1.5 MG/DL (ref 0.5–1.4)
DIFFERENTIAL METHOD: ABNORMAL
EOSINOPHIL # BLD AUTO: 0.3 K/UL (ref 0–0.5)
EOSINOPHIL NFR BLD: 3.5 % (ref 0–8)
ERYTHROCYTE [DISTWIDTH] IN BLOOD BY AUTOMATED COUNT: 13.2 % (ref 11.5–14.5)
EST. GFR  (AFRICAN AMERICAN): >60 ML/MIN/1.73 M^2
EST. GFR  (NON AFRICAN AMERICAN): 53 ML/MIN/1.73 M^2
ESTIMATED AVG GLUCOSE: 344 MG/DL (ref 68–131)
GLUCOSE SERPL-MCNC: 426 MG/DL (ref 70–110)
HBA1C MFR BLD HPLC: 13.6 % (ref 4–5.6)
HCT VFR BLD AUTO: 45.9 % (ref 40–54)
HDLC SERPL-MCNC: 20 MG/DL (ref 40–75)
HDLC SERPL: 5 % (ref 20–50)
HGB BLD-MCNC: 16 G/DL (ref 14–18)
IMM GRANULOCYTES # BLD AUTO: 0.05 K/UL (ref 0–0.04)
IMM GRANULOCYTES NFR BLD AUTO: 0.6 % (ref 0–0.5)
LDLC SERPL CALC-MCNC: ABNORMAL MG/DL (ref 63–159)
LYMPHOCYTES # BLD AUTO: 2.6 K/UL (ref 1–4.8)
LYMPHOCYTES NFR BLD: 31.2 % (ref 18–48)
MCH RBC QN AUTO: 29.9 PG (ref 27–31)
MCHC RBC AUTO-ENTMCNC: 34.9 G/DL (ref 32–36)
MCV RBC AUTO: 86 FL (ref 82–98)
MONOCYTES # BLD AUTO: 0.5 K/UL (ref 0.3–1)
MONOCYTES NFR BLD: 6.3 % (ref 4–15)
NEUTROPHILS # BLD AUTO: 4.8 K/UL (ref 1.8–7.7)
NEUTROPHILS NFR BLD: 57.1 % (ref 38–73)
NONHDLC SERPL-MCNC: 377 MG/DL
NRBC BLD-RTO: 0 /100 WBC
PLATELET # BLD AUTO: 249 K/UL (ref 150–350)
PMV BLD AUTO: 12.5 FL (ref 9.2–12.9)
POTASSIUM SERPL-SCNC: 5.5 MMOL/L (ref 3.5–5.1)
PROT SERPL-MCNC: 11.8 G/DL (ref 6–8.4)
RBC # BLD AUTO: 5.36 M/UL (ref 4.6–6.2)
SODIUM SERPL-SCNC: 127 MMOL/L (ref 136–145)
T4 FREE SERPL-MCNC: 1.02 NG/DL (ref 0.71–1.51)
TRIGL SERPL-MCNC: 2914 MG/DL (ref 30–150)
TSH SERPL DL<=0.005 MIU/L-ACNC: 2.34 UIU/ML (ref 0.4–4)
WBC # BLD AUTO: 8.47 K/UL (ref 3.9–12.7)

## 2020-11-24 PROCEDURE — 83036 HEMOGLOBIN GLYCOSYLATED A1C: CPT

## 2020-11-24 PROCEDURE — 84443 ASSAY THYROID STIM HORMONE: CPT

## 2020-11-24 PROCEDURE — 80061 LIPID PANEL: CPT

## 2020-11-24 PROCEDURE — 36415 COLL VENOUS BLD VENIPUNCTURE: CPT

## 2020-11-24 PROCEDURE — 85025 COMPLETE CBC W/AUTO DIFF WBC: CPT

## 2020-11-24 PROCEDURE — 84153 ASSAY OF PSA TOTAL: CPT

## 2020-11-24 PROCEDURE — 84439 ASSAY OF FREE THYROXINE: CPT

## 2020-11-24 PROCEDURE — 86703 HIV-1/HIV-2 1 RESULT ANTBDY: CPT

## 2020-11-24 PROCEDURE — 86803 HEPATITIS C AB TEST: CPT

## 2020-11-24 PROCEDURE — 80053 COMPREHEN METABOLIC PANEL: CPT

## 2020-11-25 LAB
HCV AB SERPL QL IA: NEGATIVE
HIV 1+2 AB+HIV1 P24 AG SERPL QL IA: NEGATIVE

## 2020-11-27 DIAGNOSIS — I10 ESSENTIAL HYPERTENSION: ICD-10-CM

## 2020-11-27 DIAGNOSIS — E11.9 TYPE 2 DIABETES MELLITUS WITHOUT COMPLICATION, WITHOUT LONG-TERM CURRENT USE OF INSULIN: Primary | ICD-10-CM

## 2020-11-27 RX ORDER — METFORMIN HYDROCHLORIDE 1000 MG/1
1000 TABLET ORAL 2 TIMES DAILY WITH MEALS
Qty: 180 TABLET | Refills: 3 | Status: SHIPPED | OUTPATIENT
Start: 2020-11-27 | End: 2021-12-09

## 2020-11-29 NOTE — PROGRESS NOTES
Refill Routing Note   Medication(s) are not appropriate for processing by [facility] for the following reason(s):     - Required laboratory values are abnormal  - Required vitals are abnormal    [facility] actions taken in this encounter: Defer       Medication Therapy Plan: CDMR. FOVS; BP elevated(8/19); SCr.1.4; K>5.1; Defer to PCP  Medication reconciliation completed: No   Automatic Epic Generated Protocol Data:        Requested Prescriptions   Pending Prescriptions Disp Refills    losartan-hydrochlorothiazide 50-12.5 mg (HYZAAR) 50-12.5 mg per tablet 90 tablet 0     Sig: Take 1 tablet by mouth once daily.       Cardiovascular: ARB + Diuretic Combos Failed - 11/27/2020  6:46 PM        Failed - Last BP in normal range within 360 days.     BP Readings from Last 3 Encounters:   08/16/19 (!) 142/80   04/03/19 (!) 156/87   02/21/19 (!) 156/81              Failed - K in normal range and within 360 days     Potassium   Date Value Ref Range Status   11/24/2020 5.5 (H) 3.5 - 5.1 mmol/L Final     Comment:     Specimen moderately hemolyzed   02/20/2019 4.3 3.5 - 5.1 mmol/L Final   02/19/2013 4.3 3.5 - 5.1 mmol/L Final              Failed - Na is between 130 and 148 and within 360 days     Sodium   Date Value Ref Range Status   11/24/2020 127 (L) 136 - 145 mmol/L Final   02/20/2019 141 136 - 145 mmol/L Final   02/19/2013 139 136 - 145 mmol/L Final              Failed - Cr is 1.4 or below and within 360 days     Creatinine   Date Value Ref Range Status   11/24/2020 1.5 (H) 0.5 - 1.4 mg/dL Final   02/20/2019 1.0 0.5 - 1.4 mg/dL Final   02/19/2013 1.0 0.5 - 1.4 mg/dl Final              Passed - Patient is at least 18 years old        Passed - Office visit in past 12 months or future 90 days     Recent Outpatient Visits            1 week ago Annual physical exam    [facility] Elpidio Robertson MD    1 year ago Essential hypertension    [facility] Elpidio Robertson MD    1 year ago DURGA  (obstructive sleep apnea)    [place] [facility] Litzy Jarvis MD          Future Appointments              Tomorrow Elpidio Robertson MD [place] [facility]    In 1 month Elpidio Robertson MD [place] [facility], [place]                Passed - eGFR within 360 days     eGFR if non    Date Value Ref Range Status   11/24/2020 53 (A) >60 mL/min/1.73 m^2 Final     Comment:     Calculation used to obtain the estimated glomerular filtration  rate (eGFR) is the CKD-EPI equation.      02/20/2019 >60 >60 mL/min/1.73 m^2 Final     Comment:     Calculation used to obtain the estimated glomerular filtration  rate (eGFR) is the CKD-EPI equation.      02/19/2013 >60 >60 mL/min Final     eGFR if    Date Value Ref Range Status   11/24/2020 >60 >60 mL/min/1.73 m^2 Final   02/20/2019 >60 >60 mL/min/1.73 m^2 Final   02/19/2013 >60 >60 mL/min Final     Comment:     Estimated glomerular filtration rate (eGFR) is normalized to an  average body surface area of 1.73 square meters.  The calculation  used to obtain the eGFR is the adjusted MDRD equation, which factors  patient sex, age, race, and creatinine result.  Since race is unknown  in our information system, the eGFR values for -American  and Non--American patients are given for each creatinine  result.                    Appointments  past 12m or future 3m with PCP    Date Provider   Last Visit   11/16/2020 Elpidio Robertson MD   Next Visit   11/30/2020 Elpidio Robertson MD   ED visits in past 90 days: 0        Note composed:5:05 PM 11/29/2020

## 2020-11-30 ENCOUNTER — OFFICE VISIT (OUTPATIENT)
Dept: PRIMARY CARE CLINIC | Facility: CLINIC | Age: 50
End: 2020-11-30
Payer: COMMERCIAL

## 2020-11-30 ENCOUNTER — TELEPHONE (OUTPATIENT)
Dept: PRIMARY CARE CLINIC | Facility: CLINIC | Age: 50
End: 2020-11-30

## 2020-11-30 ENCOUNTER — PATIENT MESSAGE (OUTPATIENT)
Dept: FAMILY MEDICINE | Facility: CLINIC | Age: 50
End: 2020-11-30

## 2020-11-30 DIAGNOSIS — R79.89 ELEVATED LFTS: ICD-10-CM

## 2020-11-30 DIAGNOSIS — E11.9 TYPE 2 DIABETES MELLITUS WITHOUT COMPLICATION, WITHOUT LONG-TERM CURRENT USE OF INSULIN: Primary | ICD-10-CM

## 2020-11-30 DIAGNOSIS — E78.1 HYPERTRIGLYCERIDEMIA: ICD-10-CM

## 2020-11-30 DIAGNOSIS — N17.9 AKI (ACUTE KIDNEY INJURY): ICD-10-CM

## 2020-11-30 DIAGNOSIS — I10 ESSENTIAL HYPERTENSION: ICD-10-CM

## 2020-11-30 DIAGNOSIS — E87.5 HYPERKALEMIA: ICD-10-CM

## 2020-11-30 PROCEDURE — 99214 OFFICE O/P EST MOD 30 MIN: CPT | Mod: 95,,, | Performed by: INTERNAL MEDICINE

## 2020-11-30 PROCEDURE — 99214 PR OFFICE/OUTPT VISIT, EST, LEVL IV, 30-39 MIN: ICD-10-PCS | Mod: 95,,, | Performed by: INTERNAL MEDICINE

## 2020-11-30 RX ORDER — LOSARTAN POTASSIUM AND HYDROCHLOROTHIAZIDE 12.5; 5 MG/1; MG/1
1 TABLET ORAL DAILY
Qty: 90 TABLET | Refills: 3 | Status: SHIPPED | OUTPATIENT
Start: 2020-11-30 | End: 2020-11-30 | Stop reason: SDUPTHER

## 2020-11-30 RX ORDER — LOSARTAN POTASSIUM AND HYDROCHLOROTHIAZIDE 12.5; 5 MG/1; MG/1
2 TABLET ORAL DAILY
Qty: 180 TABLET | Refills: 3 | Status: SHIPPED | OUTPATIENT
Start: 2020-11-30 | End: 2021-12-09

## 2020-11-30 RX ORDER — LANCETS
EACH MISCELLANEOUS
Qty: 100 EACH | Refills: 11 | Status: SHIPPED | OUTPATIENT
Start: 2020-11-30 | End: 2021-11-23 | Stop reason: SDUPTHER

## 2020-11-30 RX ORDER — FENOFIBRATE 160 MG/1
160 TABLET ORAL DAILY
Qty: 90 TABLET | Refills: 3 | Status: SHIPPED | OUTPATIENT
Start: 2020-11-30 | End: 2021-12-09

## 2020-11-30 RX ORDER — INSULIN PUMP SYRINGE, 3 ML
EACH MISCELLANEOUS
Qty: 1 EACH | Refills: 0 | Status: SHIPPED | OUTPATIENT
Start: 2020-11-30 | End: 2021-11-30

## 2020-11-30 NOTE — TELEPHONE ENCOUNTER
----- Message from Molly Marroquin sent at 11/30/2020  2:47 PM CST -----  Contact: [facility]   Pharmacy is calling to clarify an RX.  RX name:  losartan-hydrochlorothiazide 50-12.5 mg (HYZAAR) 50-12.5 mg per tablet  What do they need to clarify:  dosage  Comments: Pt states he should be taking 2 a day

## 2020-11-30 NOTE — PROGRESS NOTES
[facility] Virtual Visit Note    The patient location is: [place]  The chief complaint leading to consultation is:   Visit type: Virtual visit with synchronous audio and video  Total time spent with patient: 15 min  Each patient to whom he or she provides medical services by telemedicine is:  (1) informed of the relationship between the physician and patient and the respective role of any other health care provider with respect to management of the patient; and (2) notified that he or she may decline to receive medical services by telemedicine and may withdraw from such care at any time.      Chief Complaint      Chief Complaint   Patient presents with    Follow-up    Diabetes    Hyperlipidemia     History of Present Illness      Nael Bravo is a 50 y.o. male with chronic conditions of DM2, HTN, GERD, DURGA, Depression who presents today for: follow up new diagnosis diabetes and elevated triglycerides.  Since last visit, labs showed , A1C 13.6, Triglyceride 2914.  Has started on metformin twice daily and has noticed polyuria, polydipsia improving.  Last available labs show  2/2019.  No previous lipid panel to trend triglycerides.  Reports a low sugar, minimal alcohol diet at baseline.  Has started to cut back carbs but mostly does whole grain foods anyway.      Labs also showed elevated creatinine, mildly elevated potassium, mildly elevated LFTs.      Past Medical History:  Past Medical History:   Diagnosis Date    Perirectal abscess     Perirectal abscess        Past Surgical History:   has a past surgical history that includes Appendectomy; Sinus surgery (2006); and Incision and drainage of perirectal region (Left, 2/20/2019).    Family History:  family history includes Breast cancer in his maternal grandmother and paternal grandmother.     Social History:  Social History     Tobacco Use    Smoking status: Never Smoker    Smokeless tobacco: Never Used   Substance Use Topics     Alcohol use: No     Frequency: Never    Drug use: No       Review of Systems   Constitutional: Positive for weight loss. Negative for chills, fever and malaise/fatigue.   Eyes: Positive for blurred vision.   Respiratory: Negative for shortness of breath.    Cardiovascular: Negative for chest pain.   Gastrointestinal: Negative for constipation, diarrhea, nausea and vomiting.   Skin: Negative for rash.   Neurological: Positive for dizziness and weakness. Negative for tremors, seizures and headaches.   Endo/Heme/Allergies: Positive for polydipsia.   Psychiatric/Behavioral: The patient is not nervous/anxious.    All other systems reviewed and are negative.       Medications:  Outpatient Encounter Medications as of 11/30/2020   Medication Sig Dispense Refill    fenofibrate 160 MG Tab Take 1 tablet (160 mg total) by mouth once daily. 90 tablet 3    losartan-hydrochlorothiazide 50-12.5 mg (HYZAAR) 50-12.5 mg per tablet Take 1 tablet by mouth once daily. 90 tablet 3    metFORMIN (GLUCOPHAGE) 1000 MG tablet Take 1 tablet (1,000 mg total) by mouth 2 (two) times daily with meals. 180 tablet 3    montelukast (SINGULAIR) 10 mg tablet Take 1 tablet(s) every day by oral route at bedtime for 30 days. 90 tablet 3    pantoprazole (PROTONIX) 40 MG tablet TAKE 1 TABLET BY MOUTH DAILY 90 tablet 3    venlafaxine (EFFEXOR) 25 MG Tab TAKE 1 TABLET BY MOUTH DAILY 90 tablet 3    [DISCONTINUED] ALPRAZolam (XANAX) 0.5 MG tablet 1 pill PO as needed for anxiety 30 tablet 0    [DISCONTINUED] azelastine (ASTELIN) 137 mcg (0.1 %) nasal spray 1 spray by Nasal route 2 (two) times daily as needed.      [DISCONTINUED] fluticasone propionate (FLONASE) 50 mcg/actuation nasal spray 2 sprays by Each Nostril route once daily.      [DISCONTINUED] hydrOXYzine (ATARAX) 50 MG tablet 1 pill PO as needed at bedtime for insomnia 30 tablet 0    [DISCONTINUED] losartan-hydrochlorothiazide 50-12.5 mg (HYZAAR) 50-12.5 mg per tablet Take 1 tablet by mouth  once daily. 90 tablet 3     No facility-administered encounter medications on file as of 11/30/2020.        Allergies:  Review of patient's allergies indicates:  No Known Allergies    Health Maintenance:  Immunization History   Administered Date(s) Administered    Influenza - Trivalent (ADULT) 03/08/2016      Health Maintenance   Topic Date Due    Foot Exam  11/17/1978    Eye Exam  11/17/1978    TETANUS VACCINE  11/17/1986    Pneumococcal Vaccine (Medium Risk) (1 of 1 - PPSV23) 11/17/1987    Low Dose Statin  11/17/1989    Hemoglobin A1c  05/24/2021    Lipid Panel  11/24/2021    Hepatitis C Screening  Completed        Physical Exam       ]    Physical Exam  Constitutional:       General: He is not in acute distress.     Appearance: He is well-developed. He is not diaphoretic.   HENT:      Head: Normocephalic and atraumatic.   Eyes:      General: No scleral icterus.        Right eye: No discharge.         Left eye: No discharge.      Conjunctiva/sclera: Conjunctivae normal.   Pulmonary:      Effort: Pulmonary effort is normal. No respiratory distress.   Neurological:      Mental Status: He is alert and oriented to person, place, and time.   Psychiatric:         Behavior: Behavior normal.         Thought Content: Thought content normal.         Judgment: Judgment normal.          Laboratory:  CBC:  Recent Labs   Lab 02/20/19  1022 11/24/20  0855   WBC 8.73 8.47   RBC 5.11 5.36   Hemoglobin 14.3 16.0   Hematocrit 45.0 45.9   Platelets 273 249   MCV 88 86   MCH 28.0 29.9   MCHC 31.8 L 34.9     CMP:  Recent Labs   Lab 02/20/19  1022 11/24/20  0855   Glucose 115 H 426 H   Calcium 9.7 9.6   Albumin 3.9 4.1   Total Protein 7.3 11.8 H   Sodium 141 127 L   Potassium 4.3 5.5 H   CO2 23 14 L   Chloride 108 91 L   BUN 13 16   Alkaline Phosphatase 90 103   ALT 50 H 61 H   AST 24 64 H   Total Bilirubin 0.5 0.6     URINALYSIS:       LIPIDS:  Recent Labs   Lab 11/24/20  0855   TSH 2.342   HDL 20 L   Cholesterol 397 H    Triglycerides 2,914 H   LDL Cholesterol Invalid, Trig>400.0   HDL/Cholesterol Ratio 5.0 L   Non-HDL Cholesterol 377   Total Cholesterol/HDL Ratio 19.9 H     TSH:  Recent Labs   Lab 11/24/20  0855   TSH 2.342     A1C:  Recent Labs   Lab 11/24/20  0855   Hemoglobin A1C 13.6 H       Assessment/Plan     Nael Bravo is a 50 y.o.male with:    1. Type 2 diabetes mellitus without complication, without long-term current use of insulin  - Ambulatory referral/consult to Endocrinology; Future  Referring to endocrinology for additional evaluation.  Cont metformin.  Sending in glucometer to start daily testing.  Recheck labs in 1 week.  2. Hypertriglyceridemia  - fenofibrate 160 MG Tab; Take 1 tablet (160 mg total) by mouth once daily.  Dispense: 90 tablet; Refill: 3  Start fenofibrate.  Has family history of elevated triglycerides.  Recheck in 1-2 months.  Checking pancreas enzymes as well.  3. Essential hypertension  - losartan-hydrochlorothiazide 50-12.5 mg (HYZAAR) 50-12.5 mg per tablet; Take 1 tablet by mouth once daily.  Dispense: 90 tablet; Refill: 3  Continue current meds.    4. JOSELIN (acute kidney injury)  - Comprehensive Metabolic Panel; Future  - Lipase; Future  - Amylase; Future  Recheck in 1 week  5. Hyperkalemia  - Comprehensive Metabolic Panel; Future  Recheck in 1 week.  6. Elevated LFTs  - Comprehensive Metabolic Panel; Future  - Lipase; Future  - Amylase; Future  Recheck in 1 week    Chronic conditions status updated as per HPI.  Other than changes above, cont current medications and maintain follow up with specialists.  Return to clinic in 1 month as scheduled.    Elpidio Robertson MD  [facility]                Answers for HPI/ROS submitted by the patient on 11/28/2020   Diabetes problem  MedicAlert ID: No  Disease duration: 2 days  fatigue: Yes  foot paresthesias: No  foot ulcerations: No  polyphagia: No  polyuria: Yes  visual change: Yes  Symptom course: improving  confusion: No  hunger: No  mood  changes: No  pallor: No  sleepiness: Yes  speech difficulty: No  sweats: No  blackouts: No  hospitalization: No  nocturnal hypoglycemia: No  required assistance: No  required glucagon: No  CVA: No  heart disease: No  impotence: No  nephropathy: No  peripheral neuropathy: No  PVD: No  retinopathy: No  autonomic neuropathy: No  CAD risks: dyslipidemia, hypertension, obesity, stress, diabetes mellitus  Current treatments: oral agent (monotherapy)  Treatment compliance: all of the time  Weight trend: decreasing steadily  Current diet: generally healthy, low salt  Meal planning: avoidance of concentrated sweets  Exercise: three times a week  Dietitian visit: No  Eye exam current: No  Sees podiatrist: No

## 2020-12-02 ENCOUNTER — PATIENT MESSAGE (OUTPATIENT)
Dept: FAMILY MEDICINE | Facility: CLINIC | Age: 50
End: 2020-12-02

## 2020-12-02 DIAGNOSIS — E78.1 HYPERTRIGLYCERIDEMIA: Primary | ICD-10-CM

## 2020-12-02 DIAGNOSIS — I10 ESSENTIAL HYPERTENSION: ICD-10-CM

## 2020-12-02 NOTE — TELEPHONE ENCOUNTER
Referral to cardiology placed.  Please work with patient to find out where he would rather be seen.  [facility], [facility], [place].

## 2020-12-03 ENCOUNTER — PATIENT MESSAGE (OUTPATIENT)
Dept: FAMILY MEDICINE | Facility: CLINIC | Age: 50
End: 2020-12-03

## 2020-12-03 ENCOUNTER — PATIENT MESSAGE (OUTPATIENT)
Dept: ADMINISTRATIVE | Facility: OTHER | Age: 50
End: 2020-12-03

## 2020-12-03 DIAGNOSIS — E11.9 TYPE 2 DIABETES MELLITUS WITHOUT COMPLICATION, WITHOUT LONG-TERM CURRENT USE OF INSULIN: Primary | ICD-10-CM

## 2020-12-03 RX ORDER — LINAGLIPTIN 5 MG/1
5 TABLET, FILM COATED ORAL DAILY
Qty: 90 TABLET | Refills: 3 | Status: SHIPPED | OUTPATIENT
Start: 2020-12-03 | End: 2020-12-29

## 2020-12-03 NOTE — TELEPHONE ENCOUNTER
Sending in tradjenta to take in the mornings with metformin.  It doesn't look like pt has been scheduled for endocrinology as ordered.  Please check on that.

## 2020-12-04 ENCOUNTER — CLINICAL SUPPORT (OUTPATIENT)
Dept: DIABETES | Facility: CLINIC | Age: 50
End: 2020-12-04
Payer: COMMERCIAL

## 2020-12-04 ENCOUNTER — PATIENT MESSAGE (OUTPATIENT)
Dept: ENDOCRINOLOGY | Facility: CLINIC | Age: 50
End: 2020-12-04

## 2020-12-04 ENCOUNTER — OFFICE VISIT (OUTPATIENT)
Dept: ENDOCRINOLOGY | Facility: CLINIC | Age: 50
End: 2020-12-04
Payer: COMMERCIAL

## 2020-12-04 ENCOUNTER — CLINICAL SUPPORT (OUTPATIENT)
Dept: URGENT CARE | Facility: CLINIC | Age: 50
End: 2020-12-04
Payer: COMMERCIAL

## 2020-12-04 VITALS
RESPIRATION RATE: 16 BRPM | DIASTOLIC BLOOD PRESSURE: 80 MMHG | SYSTOLIC BLOOD PRESSURE: 140 MMHG | WEIGHT: 311.06 LBS | OXYGEN SATURATION: 93 % | BODY MASS INDEX: 39.94 KG/M2 | HEART RATE: 71 BPM

## 2020-12-04 VITALS — BODY MASS INDEX: 39.8 KG/M2 | WEIGHT: 310 LBS

## 2020-12-04 DIAGNOSIS — E78.1 HYPERTRIGLYCERIDEMIA: ICD-10-CM

## 2020-12-04 DIAGNOSIS — E11.9 TYPE 2 DIABETES MELLITUS WITHOUT COMPLICATION, WITHOUT LONG-TERM CURRENT USE OF INSULIN: ICD-10-CM

## 2020-12-04 DIAGNOSIS — I10 ESSENTIAL HYPERTENSION: ICD-10-CM

## 2020-12-04 DIAGNOSIS — E78.1 HYPERTRIGLYCERIDEMIA: Primary | ICD-10-CM

## 2020-12-04 DIAGNOSIS — Z20.822 ENCOUNTER FOR LABORATORY TESTING FOR COVID-19 VIRUS: Primary | ICD-10-CM

## 2020-12-04 LAB
CTP QC/QA: YES
SARS-COV-2 RDRP RESP QL NAA+PROBE: NEGATIVE

## 2020-12-04 PROCEDURE — 99999 PR PBB SHADOW E&M-EST. PATIENT-LVL V: ICD-10-PCS | Mod: PBBFAC,,, | Performed by: INTERNAL MEDICINE

## 2020-12-04 PROCEDURE — 99999 PR PBB SHADOW E&M-EST. PATIENT-LVL II: CPT | Mod: PBBFAC,,,

## 2020-12-04 PROCEDURE — 99999 PR PBB SHADOW E&M-EST. PATIENT-LVL V: CPT | Mod: PBBFAC,,, | Performed by: INTERNAL MEDICINE

## 2020-12-04 PROCEDURE — U0002 COVID-19 LAB TEST NON-CDC: HCPCS | Mod: QW,S$GLB,, | Performed by: PHYSICIAN ASSISTANT

## 2020-12-04 PROCEDURE — G0108 DIAB MANAGE TRN  PER INDIV: HCPCS | Mod: S$GLB,,, | Performed by: INTERNAL MEDICINE

## 2020-12-04 PROCEDURE — 99999 PR PBB SHADOW E&M-EST. PATIENT-LVL II: ICD-10-PCS | Mod: PBBFAC,,,

## 2020-12-04 PROCEDURE — 99204 OFFICE O/P NEW MOD 45 MIN: CPT | Mod: S$GLB,,, | Performed by: INTERNAL MEDICINE

## 2020-12-04 PROCEDURE — 99204 PR OFFICE/OUTPT VISIT, NEW, LEVL IV, 45-59 MIN: ICD-10-PCS | Mod: S$GLB,,, | Performed by: INTERNAL MEDICINE

## 2020-12-04 PROCEDURE — U0002: ICD-10-PCS | Mod: QW,S$GLB,, | Performed by: PHYSICIAN ASSISTANT

## 2020-12-04 PROCEDURE — G0108 PR DIAB MANAGE TRN  PER INDIV: ICD-10-PCS | Mod: S$GLB,,, | Performed by: INTERNAL MEDICINE

## 2020-12-04 RX ORDER — LANCETS 28 GAUGE
EACH MISCELLANEOUS
COMMUNITY
Start: 2020-11-30

## 2020-12-04 RX ORDER — INSULIN ASPART INJECTION 100 [IU]/ML
INJECTION, SOLUTION SUBCUTANEOUS
Qty: 15 ML | Refills: 3 | Status: SHIPPED | OUTPATIENT
Start: 2020-12-04 | End: 2021-06-03

## 2020-12-04 RX ORDER — INSULIN GLARGINE 100 [IU]/ML
INJECTION, SOLUTION SUBCUTANEOUS
Qty: 15 ML | Refills: 3 | Status: SHIPPED | OUTPATIENT
Start: 2020-12-04 | End: 2021-05-11

## 2020-12-04 RX ORDER — PEN NEEDLE, DIABETIC 31 GX5/16"
NEEDLE, DISPOSABLE MISCELLANEOUS
Qty: 200 EACH | Refills: 3 | Status: SHIPPED | OUTPATIENT
Start: 2020-12-04

## 2020-12-04 NOTE — ASSESSMENT & PLAN NOTE
Blood pressure slightly above goal in clinic today.  Will have him monitor at home, can increase losartan if needed.

## 2020-12-04 NOTE — PATIENT INSTRUCTIONS
Please start the following insulins:    Long acting insulin (basaglar) 40 units nightly    Short acting insulin (Fiasp) 3 times a day before each meal 8 units about 5 minutes before you start eating in addition to the following sliding scale or correction scale.    Sliding Scale  If your blood sugar is:  150-199             give extra 1 units of insulin to yourself.  200-249                             2 units  250-299         3 units  300-349                             4 units  350-399                             5 units  >400                                  6 units    Please check your blood sugar 4 times a day (before each meal and at bedtime) and write it down in your blood sugar log along with your doses of short acting insulin.  It is very important that you bring this log with you to every doctor's visit.    Send me a picture of your blood sugar log through the portal in 2 weeks    Hypoglycemia - Low Blood Sugar    What can you do?  Rule of 15:    Test your blood sugar   If glucose is between 50-70 mg/dL then ingest 15 grams of fast-acting carbs   If glucose is less than 50 mg/dL then ingest 30 grams of fast-acting carbs   Ingest 15 grams of fast-acting carbohydrate - such as:   a. 3-4 glucose tablets  b. 4 oz juice  c. ½ can regular soda pop  d. 15 skittles or mini jelly beans    Re-check your blood sugar in 15 minutes. If its less than 70mg/dl, repeat steps 2 and 3.   If your next meal is more than 1 hour away, eat an additional 15 grams of carbohydrate and 1 ounce of protein (examples include crackers with cheese or one-half of a sandwich with peanut butter). It is important not to eat too much because this can raise your blood sugar above the target level.      After your blood sugar has normalized, think about why you went low. If you notice a pattern of low blood sugars, contact your Diabetes Team. We may need to adjust your medication.     Hypoglycemia (Low Blood Glucose)  Low blood glucose occurs  with the following conditions.  · Not Enough Food or Missing Meal.  · Too Much Insulin  · More Exercise Than Usual    It is best to use something that you can always carry with you. Choose a food that is all carbohydrate because it will be very fast acting. Try not to choose chocolate or other high fat foods. They will not work fast enough and you may also end up over-treating your lows. The suggested amount of carbohydrate to start with is 15 grams. Don't keep eating until you feel better. Eat the required amount and stop. The feelings will pass and you will be grateful that you did not overdo it.    Some people with diabetes know when their blood glucose is low and some do not. If you are a person who is not aware of hypoglycemia, it is important to test your blood glucose more often. Everyone with diabetes should test before driving a car to assure safety on the road. Blood glucose should be above 100 mg/dl before driving and at bedtime.     The symptoms of low blood sugars are usually heart racing, sweating, anxiety, feeling hungry, tremor, weakness or most severely loss of consciousness.

## 2020-12-04 NOTE — ASSESSMENT & PLAN NOTE
Uncontrolled with elevated hemoglobin A1c and blood sugars at home persistently in 300s despite following a very low carb diet.  We discussed that there may be an element of glucose toxicity so I recommend starting basal and prandial insulin and patient is agreeable to this.  Will refer patient for dilated eye exam as well as comprehensive Diabetes Education.    Start days ago are 40 units nightly and fiasp 8 units before meals in addition to low-dose sliding scale (1:50 >150).  Patient was instructed to send me a picture of his blood glucose log with 4 times a day checks in 2 weeks.  Will follow up in 1 month to see if we need to further adjust insulin.  Once glycemic control has improved will check urine microalbumin but he is already on losartan with room to increase dose.

## 2020-12-04 NOTE — PROGRESS NOTES
Diabetes Education  Author: Gary Coyne RN  Date: 12/4/2020  Diabetes Care Management Summary  Diabetes Education Record Assessment/Progress: Initial  Current Diabetes Risk Level: High   Last A1c:     Last Visit with Diabetes Educator: : 12/04/2020  Last OPCM Referral: : Not Found   Enrolled in OPCM: No  Diabetes Type  Diabetes Type : Type II  Diabetes History  Diabetes Diagnosis: 0-1 year  Current Treatment: Oral Medication, Diet, Injectable, Exercise, Insulin  Health Maintenance was reviewed today with patient. Discussed with patient importance of routine eye exams, foot exams/foot care, blood work (i.e.: A1c, microalbumin, and lipid), dental visits, yearly flu vaccine, and pneumonia vaccine as indicated by PCP. Patient verbalized understanding.     Health Maintenance Topics with due status: Not Due       Topic Last Completion Date    Lipid Panel 11/24/2020    Hemoglobin A1c 11/24/2020     Health Maintenance Due   Topic Date Due    Foot Exam  11/17/1978    Eye Exam  11/17/1978    TETANUS VACCINE  11/17/1986    Pneumococcal Vaccine (Medium Risk) (1 of 1 - PPSV23) 11/17/1987    Low Dose Statin  11/17/1989    Shingles Vaccine (1 of 2) 11/17/2018    Colorectal Cancer Screening  11/17/2018    Influenza Vaccine (1) 08/01/2020   Nutrition  Meal Planning: water, 3 meals per day, diet drinks, snacks between meal  What type of sweetener do you use?: Stevia/Truvia  What type of beverages do you drink?: diet soda/tea, water(coffee with creamer)  Meal Plan 24 Hour Recall - Breakfast: eggs, cheese  Meal Plan 24 Hour Recall - Lunch: chicken breast, asparagus, cheese, cabbage  Meal Plan 24 Hour Recall - Dinner: salad  Meal Plan 24 Hour Recall - Snack: nuts, apples  Monitoring   Self Monitoring : pt has a meter and is checking once a day in am. bs this am was 300. instructed pt to test 4 times a day before each injection. Instructed pt on the normal bs ranges. instructed pt to keep a log of his bs's and to bring the  log to his md visits. log sheets provided.  Blood Glucose Logs: No  Do you use a personal continuous glucose monitor?: No  In the last month, how often have you had a low blood sugar reaction?: never  Can you tell when your blood sugar is too high?: no  Exercise   Exercise Type: use exercise equipment  Intensity: Low  Frequency: 3-5 Times per week  Duration: 15 min  Current Diabetes Treatment   Current Treatment: Oral Medication, Diet, Injectable, Exercise, Insulin  Social History  Preferred Learning Method: Face to Face, Demonstration, Reading Materials  Primary Support: Spouse  Educational Level: College Graduate  Occupation: [employer]  Smoking Status: Never a Smoker  Alcohol Use: Never  DDS-2 Score  ( > 3 = SIGNIFICANT DISTRESS): 2   Barriers to Change  Barriers to Change: None  Learning Challenges : None  Readiness to Learn   Readiness to Learn : Acceptance  Cultural Influences  Cultural Influences: None  Diabetes Education Assessment/Progress  Diabetes Disease Process (diabetes disease process and treatment options): Written Materials Provided, Discussion, Individual Session, Demonstrates Understanding/Competency(verbalizes/demonstrates), Instructed  Nutrition (Incorporating nutritional management into one's lifestyle): Demonstration, Written Materials Provided, Discussion, Individual Session, Demonstrates Understanding/Competency (verbalizes/demonstrates), Instructed  Physical Activity (incorporating physical activity into one's lifestyle): Written Materials Provided, Discussion, Individual Session, Demonstrates Understanding/Competency (verbalizes/demonstrates), Instructed  Medications (states correct name, dose, onset, peak, duration, side effects & timing of meds): Demonstration, Written Materials Provided, Discussion, Individual Session, Demonstrates Understanding/Competency(verbalizes/demonstrates), Instructed  Monitoring (monitoring blood glucose/other parameters & using results): Written Materials Provided,  Discussion, Individual Session, Demonstrates Understanding/Competency (verbalizes/demonstrates), Instructed  Acute Complications (preventing, detecting, and treating acute complications): Written Materials Provided, Discussion, Individual Session, Demonstrates Understanding/Competency (verbalizes/demonstrates), Instructed  Chronic Complications (preventing, detecting, and treating chronic complications): Written Materials Provided, Discussion, Individual Session, Demonstrates Understanding/Competency (verbalizes/demonstrates), Instructed  Clinical (diabetes, other pertinent medical history, and relevant comorbidities reviewed during visit): Discussion, Individual Session, Demonstrates Understanding/Competency (verbalizes/demonstrates)  Cognitive (knowledge of self-management skills, functional health literacy): Discussion, Individual Session, Demonstrates Understanding/Competency (verbalizes/demonstrates)  Psychosocial (emotional response to diabetes): Discussion, Individual Session, Demonstrates Understanding/Competency (verbalizes/demonstrates)  Diabetes Distress and Support Systems: Discussion, Individual Session, Demonstrates Understanding/Competency (verbalizes/demonstrates)  Behavioral (readiness for change, lifestyle practices, self-care behaviors): Discussion, Individual Session, Demonstrates Understanding/Competency (verbalizes/demonstrates)  Goals  Patient has selected/evaluated goals during today's session: Yes, selected  Healthy Eating: Set  Target Date: 01/04/21(eat balanced meals)  Medications: Set  Start Date: 12/04/20  Target Date: 01/07/21(compliance with new starts of insulin)    Diabetes Meal Plan  Restrictions: Restricted Carbohydrate  Calories: 1800  Carbohydrate Per Meal: 45-60g  Carbohydrate Per Snack : 15-20g   Pt is a newly diagnosed diabetic. Instructed pt on the food groups, how to read labels and count carbs. Pt was given sample menus and meal plans as examples. Pt has been eating mainly  avoiding carbs and eating large amounts of protein. Discussed with pt the importance of eating balanced and portioned. Discussed with pt how to put his meals together. Discussed with pt foods that he likes that he could include in his meal plan. Pt understood portion control and measuring and will work on adjustments. Pt is to start on Basaglar and Fiasp. Instructed pt on these insulins, actions, use of flex pens, storage of pens, how to do test dose, how to dial up dose, injection sites and rotation of sites, how to inject and disposal of supplies. Instructed pt to test bs before each injection. Instructed pt on the importance of eating a meal with the Fiasp. Instructed pt on the use of the sliding scale and pt had good understanding of this. Pt was advised to call for any problems or questions. Pt will fu in 2 weeks.  Today's Self-Management Care Plan was developed with the patient's input and is based on barriers identified during today's assessment.    The long and short-term goals in the care plan were written with the patient/caregiver's input. The patient has agreed to work toward these goals to improve his overall diabetes control.      The patient received a copy of today's self-management plan and verbalized understanding of the care plan, goals, and all of today's instructions.      The patient was encouraged to communicate with his physician and care team regarding his condition(s) and treatment.  I provided the patient with my contact information today and encouraged him to contact me via phone or patient portal as needed.     Education Units of Time   Time Spent: 60 min

## 2020-12-04 NOTE — PROGRESS NOTES
ENDOCRINOLOGY CLINIC NEW PATIENT NOTE  2020       Subjective:      CC: referred by Elpidio Robertson MD for management of poorly-controlled type 2 diabetes    HPI:   Nael Bravo is a 50 y.o. male with hypertriglyceridemia, hypertension, obstructive sleep apnea, GERD, depression, and recent diagnosis of type 2 diabetes who presents for evaluation of uncontrolled diabetes with recent hemoglobin A1c 13.6.  Newly diagnosed diabetes with symptoms of polyuria/polydipsia.  Down 35 lbs in a couple months.      Started metformin 1 week ago with improved polyuria/polydipsia.  He has also started a very low carbohydrate diet since he found out about his diabetes.    He denies any known history of pancreatitis or medullary thyroid cancer.    Of note, he was also recently diagnosed with significant hypertriglyceridemia with triglycerides in the .  He reports that his father had elevated triglycerides of about 500 which are now normal with dietary modifications and medication.  He denies current or prior symptoms consistent with pancreatitis (no abdominal pain, nausea, vomiting decreased p.o. intake).    Diabetes Hx:  Diagnosed w/ DM: T2DM diagnosed at the end of 2020  Complications: none known  Current meds: compliant with    Metformin 1000 mg b.i.d. - no side effects  Previous meds:   Tradjenta 5 mg daily (prescribed but not started)  Hypoglycemia: denies  Home glucose checks: checks BG 2 times a day  Fastins  Couple hours after breakfast 300s  Diet/Exercise:    3 meals per day, will try to stick to 30g cho/meal and no snacks, does not drink any sugar sweetened beverages.   Exercising regularly  Last A1c:   Lab Results   Component Value Date    HGBA1C 13.6 (H) 2020     microalbumin: on losartan 50 mg daily  No results found for: LABMICR, CREATRANDUR, MICALBCREAT  Lipids: on fenofibrate 160 mg daily , not on statin  Lab Results   Component Value Date    CHOL 397 (H) 2020    TRIG 2,914 (H)  11/24/2020    HDL 20 (L) 11/24/2020    LDLCALC Invalid, Trig>400.0 11/24/2020    CHOLHDL 5.0 (L) 11/24/2020     TSH:  Lab Results   Component Value Date    TSH 2.342 11/24/2020     Eye: not had, some blurry vision   Last eye exam: Most Recent Eye Exam Date: Not Found)  Foot: no numbness/tingling, no wounds   Last foot exam: Most Recent Foot Exam Date: Not Found)  FHx of DM: denies      Past Medical History:   Diagnosis Date    Perirectal abscess     Perirectal abscess        Past Surgical History:   Procedure Laterality Date    APPENDECTOMY      INCISION AND DRAINAGE OF PERIRECTAL REGION Left 2/20/2019    Procedure: INCISION AND DRAINAGE, PERIRECTAL REGION;  Surgeon: INNA Altamirano MD;  Location: [facility];  Service: General;  Laterality: Left;  Lithotomy    SINUS SURGERY  2006       Review of patient's allergies indicates:  No Known Allergies      Current Outpatient Medications:     blood sugar diagnostic Strp, Test once daily.  Dx E11.9.  Any formulary preferred brand., Disp: 100 each, Rfl: 11    blood-glucose meter kit, Test once daily.  Dx E11.9.  Any formulary preferred brand., Disp: 1 each, Rfl: 0    fenofibrate 160 MG Tab, Take 1 tablet (160 mg total) by mouth once daily., Disp: 90 tablet, Rfl: 3    lancets Misc, Test once daily.  Dx E11.9.  Any formulary preferred brand., Disp: 100 each, Rfl: 11    linaGLIPtin (TRADJENTA) 5 mg Tab tablet, Take 1 tablet (5 mg total) by mouth once daily., Disp: 90 tablet, Rfl: 3    losartan-hydrochlorothiazide 50-12.5 mg (HYZAAR) 50-12.5 mg per tablet, Take 2 tablets by mouth once daily., Disp: 180 tablet, Rfl: 3    metFORMIN (GLUCOPHAGE) 1000 MG tablet, Take 1 tablet (1,000 mg total) by mouth 2 (two) times daily with meals., Disp: 180 tablet, Rfl: 3    montelukast (SINGULAIR) 10 mg tablet, Take 1 tablet(s) every day by oral route at bedtime for 30 days., Disp: 90 tablet, Rfl: 3    pantoprazole (PROTONIX) 40 MG tablet, TAKE 1 TABLET BY MOUTH DAILY, Disp: 90  tablet, Rfl: 3    venlafaxine (EFFEXOR) 25 MG Tab, TAKE 1 TABLET BY MOUTH DAILY, Disp: 90 tablet, Rfl: 3    Social History     Socioeconomic History    Marital status:      Spouse name: Not on file    Number of children: Not on file    Years of education: Not on file    Highest education level: Not on file   Occupational History    Not on file   Social Needs    Financial resource strain: Not on file    Food insecurity     Worry: Not on file     Inability: Not on file    Transportation needs     Medical: Not on file     Non-medical: Not on file   Tobacco Use    Smoking status: Never Smoker    Smokeless tobacco: Never Used   Substance and Sexual Activity    Alcohol use: No     Frequency: Never    Drug use: No    Sexual activity: Not on file   Lifestyle    Physical activity     Days per week: Not on file     Minutes per session: Not on file    Stress: Not on file   Relationships    Social connections     Talks on phone: Not on file     Gets together: Not on file     Attends [employer] service: Not on file     Active member of club or organization: Not on file     Attends meetings of clubs or organizations: Not on file     Relationship status: Not on file   Other Topics Concern    Not on file   Social History Narrative    Not on file       Family History   Problem Relation Age of Onset    Breast cancer Maternal Grandmother     Breast cancer Paternal Grandmother        ROS:  14 point review of systems reviewed.  Pertinent positives and negatives per HPI as well as positive for fatigue after exercise and some bloating.  All others negative.    Objective:   Physical Exam   BP (!) 140/80 (BP Location: Right arm, Patient Position: Sitting)   Pulse 71   Resp 16   Wt (!) 141.1 kg (311 lb 1.1 oz)   SpO2 (!) 93%   BMI 39.94 kg/m²   Wt Readings from Last 3 Encounters:   12/04/20 (!) 141.1 kg (311 lb 1.1 oz)   08/16/19 (!) 152.7 kg (336 lb 10.3 oz)   04/03/19 (!) 151.5 kg (334 lb)    ]    Constitutional:  Pleasant,  in no acute distress.   HENT:   Head:    Normocephalic and atraumatic.   Mouth/Throat:   Oropharynx is clear and moist. No oropharyngeal exudate.   Eyes:    EOMI. No scleral icterus. blx xanthalasma palpebrarum present  Neck:    No thyromegaly or palpable thyroid nodules, no cervical LAD  Cardiovascular:  Normal rate, regular rhythm, 2+ radial pulses blx   Respiratory:   Effort normal and breath sounds normal.   Gastrointestinal: Soft, nontender  Neurological:  No tremor, normal speech  Skin:    Skin is warm, dry, no xanthomas present on palm for over Achilles tendons.  Psych:   Normal mood and affect.   Extremity:  No edema or wounds, no deformity         LABORATORY REVIEW:    Chemistry        Component Value Date/Time     (L) 11/24/2020 0855    K 5.5 (H) 11/24/2020 0855    CL 91 (L) 11/24/2020 0855    CO2 14 (L) 11/24/2020 0855    BUN 16 11/24/2020 0855    CREATININE 1.5 (H) 11/24/2020 0855     (H) 11/24/2020 0855        Component Value Date/Time    CALCIUM 9.6 11/24/2020 0855    ALKPHOS 103 11/24/2020 0855    AST 64 (H) 11/24/2020 0855    ALT 61 (H) 11/24/2020 0855    BILITOT 0.6 11/24/2020 0855    ESTGFRAFRICA >60 11/24/2020 0855    EGFRNONAA 53 (A) 11/24/2020 0855          Lab Results   Component Value Date    HGBA1C 13.6 (H) 11/24/2020     Other labs reviewed today in HPI    Assessment/Plan:     Problem List Items Addressed This Visit        1 - High    Type 2 diabetes mellitus without complication, without long-term current use of insulin     Uncontrolled with elevated hemoglobin A1c and blood sugars at home persistently in 300s despite following a very low carb diet.  We discussed that there may be an element of glucose toxicity so I recommend starting basal and prandial insulin and patient is agreeable to this.  Will refer patient for dilated eye exam as well as comprehensive Diabetes Education.    Start days ago are 40 units nightly and fiasp 8 units before  "meals in addition to low-dose sliding scale (1:50 >150).  Patient was instructed to send me a picture of his blood glucose log with 4 times a day checks in 2 weeks.  Will follow up in 1 month to see if we need to further adjust insulin.  Once glycemic control has improved will check urine microalbumin but he is already on losartan with room to increase dose.         Relevant Medications    insulin (BASAGLAR KWIKPEN U-100 INSULIN) glargine 100 units/mL (3mL) SubQ pen    insulin aspart, niacinamide, (FIASP FLEXTOUCH U-100 INSULIN) 100 unit/mL (3 mL) InPn    BD ULTRA-FINE OLIVER PEN NEEDLE 32 gauge x 5/32" Ndle    Other Relevant Orders    Lipid Panel    Basic Metabolic Panel    Ambulatory referral/consult to Diabetes Education    Ambulatory referral/consult to Optometry       2     Hypertriglyceridemia     Triglycerides elevated to 2000 thus no LDL level obtained.  He has recently started taking fenofibrate and has made significant dietary changes.  Starting insulin may help with triglyceride levels as well.  He has no symptoms of pancreatitis.  Will repeat fasting lipids in 1 month as he may need to start statin therapy given xanthomas.            3     Essential hypertension     Blood pressure slightly above goal in clinic today.  Will have him monitor at home, can increase losartan if needed.             Patient Instructions   Please start the following insulins:    Long acting insulin (basaglar) 40 units nightly    Short acting insulin (Fiasp) 3 times a day before each meal 8 units about 5 minutes before you start eating in addition to the following sliding scale or correction scale.    Sliding Scale  If your blood sugar is:  150-199             give extra 1 units of insulin to yourself.  200-249                             2 units  250-299         3 units  300-349                             4 units  350-399                             5 units  >400                                  6 units    Please check your blood " sugar 4 times a day (before each meal and at bedtime) and write it down in your blood sugar log along with your doses of short acting insulin.  It is very important that you bring this log with you to every doctor's visit.    Send me a picture of your blood sugar log through the portal in 2 weeks    Hypoglycemia - Low Blood Sugar    What can you do?  Rule of 15:    Test your blood sugar   If glucose is between 50-70 mg/dL then ingest 15 grams of fast-acting carbs   If glucose is less than 50 mg/dL then ingest 30 grams of fast-acting carbs   Ingest 15 grams of fast-acting carbohydrate - such as:   a. 3-4 glucose tablets  b. 4 oz juice  c. ½ can regular soda pop  d. 15 skittles or mini jelly beans    Re-check your blood sugar in 15 minutes. If its less than 70mg/dl, repeat steps 2 and 3.   If your next meal is more than 1 hour away, eat an additional 15 grams of carbohydrate and 1 ounce of protein (examples include crackers with cheese or one-half of a sandwich with peanut butter). It is important not to eat too much because this can raise your blood sugar above the target level.      After your blood sugar has normalized, think about why you went low. If you notice a pattern of low blood sugars, contact your Diabetes Team. We may need to adjust your medication.     Hypoglycemia (Low Blood Glucose)  Low blood glucose occurs with the following conditions.  · Not Enough Food or Missing Meal.  · Too Much Insulin  · More Exercise Than Usual    It is best to use something that you can always carry with you. Choose a food that is all carbohydrate because it will be very fast acting. Try not to choose chocolate or other high fat foods. They will not work fast enough and you may also end up over-treating your lows. The suggested amount of carbohydrate to start with is 15 grams. Don't keep eating until you feel better. Eat the required amount and stop. The feelings will pass and you will be grateful that you did not overdo  it.    Some people with diabetes know when their blood glucose is low and some do not. If you are a person who is not aware of hypoglycemia, it is important to test your blood glucose more often. Everyone with diabetes should test before driving a car to assure safety on the road. Blood glucose should be above 100 mg/dl before driving and at bedtime.     The symptoms of low blood sugars are usually heart racing, sweating, anxiety, feeling hungry, tremor, weakness or most severely loss of consciousness.            Return to clinic in 4 wks with me or NP, fasting labs prior     Vidya Yuan MD    ADDENDUM:  12/18/2020  The patient:  - Has been seen in clinic within the last 6 months  - Has type 2 diabetes  - Performs frequent blood glucose meter (BGM) testing (?4x/day)  - takes ?3 daily injections of insulin  - Requires frequent adjustments to their treatment regimen based on BGM or CGM testing results    Patient will greatly benefit from starting Dexcom G6 CGM thus AOB paperwork sent

## 2020-12-04 NOTE — LETTER
December 4, 2020      Elpidio Robertson MD  [address] [facility]  [place] [address]           [facility] 6th [facility]  [address]  [facility] [address]  Phone: [phone]  Fax: [phone]          Patient: Nael Bravo   MR Number: [id number]   YOB: 1970   Date of Visit: 12/4/2020       Dear Dr. Elpidio Robertson:    Thank you for referring Nael Bravo to me for evaluation. Attached you will find relevant portions of my assessment and plan of care.    If you have questions, please do not hesitate to call me. I look forward to following Nael Bravo along with you.    Sincerely,    Vidya Yuan MD    Enclosure  CC:  No Recipients    If you would like to receive this communication electronically, please contact [email or web address] or [phone] to request more information on [email or web address] access.    For providers and/or their staff who would like to refer a patient to [facility], please contact us through our one-stop-shop provider referral line, [facility], at [phone].    If you feel you have received this communication in error or would no longer like to receive these types of communications, please e-mail [email or web address]

## 2020-12-04 NOTE — ASSESSMENT & PLAN NOTE
Triglycerides elevated to 2000 thus no LDL level obtained.  He has recently started taking fenofibrate and has made significant dietary changes.  Starting insulin may help with triglyceride levels as well.  He has no symptoms of pancreatitis.  Will repeat fasting lipids in 1 month as he may need to start statin therapy given xanthomas.

## 2020-12-07 ENCOUNTER — LAB VISIT (OUTPATIENT)
Dept: LAB | Facility: HOSPITAL | Age: 50
End: 2020-12-07
Attending: INTERNAL MEDICINE
Payer: COMMERCIAL

## 2020-12-07 DIAGNOSIS — N17.9 AKI (ACUTE KIDNEY INJURY): ICD-10-CM

## 2020-12-07 DIAGNOSIS — E87.5 HYPERKALEMIA: ICD-10-CM

## 2020-12-07 DIAGNOSIS — R79.89 ELEVATED LFTS: ICD-10-CM

## 2020-12-07 LAB
ALBUMIN SERPL BCP-MCNC: 3.8 G/DL (ref 3.5–5.2)
ALP SERPL-CCNC: 78 U/L (ref 55–135)
ALT SERPL W/O P-5'-P-CCNC: 62 U/L (ref 10–44)
AMYLASE SERPL-CCNC: 64 U/L (ref 20–110)
ANION GAP SERPL CALC-SCNC: 16 MMOL/L (ref 8–16)
AST SERPL-CCNC: 41 U/L (ref 10–40)
BILIRUB SERPL-MCNC: 0.9 MG/DL (ref 0.1–1)
BUN SERPL-MCNC: 10 MG/DL (ref 6–20)
CALCIUM SERPL-MCNC: 9.8 MG/DL (ref 8.7–10.5)
CHLORIDE SERPL-SCNC: 98 MMOL/L (ref 95–110)
CO2 SERPL-SCNC: 27 MMOL/L (ref 23–29)
CREAT SERPL-MCNC: 1.1 MG/DL (ref 0.5–1.4)
EST. GFR  (AFRICAN AMERICAN): >60 ML/MIN/1.73 M^2
EST. GFR  (NON AFRICAN AMERICAN): >60 ML/MIN/1.73 M^2
GLUCOSE SERPL-MCNC: 183 MG/DL (ref 70–110)
LIPASE SERPL-CCNC: 31 U/L (ref 4–60)
POTASSIUM SERPL-SCNC: 3.9 MMOL/L (ref 3.5–5.1)
PROT SERPL-MCNC: 7.3 G/DL (ref 6–8.4)
SODIUM SERPL-SCNC: 141 MMOL/L (ref 136–145)

## 2020-12-07 PROCEDURE — 80053 COMPREHEN METABOLIC PANEL: CPT

## 2020-12-07 PROCEDURE — 83690 ASSAY OF LIPASE: CPT

## 2020-12-07 PROCEDURE — 36415 COLL VENOUS BLD VENIPUNCTURE: CPT

## 2020-12-07 PROCEDURE — 82150 ASSAY OF AMYLASE: CPT

## 2020-12-09 ENCOUNTER — PATIENT MESSAGE (OUTPATIENT)
Dept: ADMINISTRATIVE | Facility: OTHER | Age: 50
End: 2020-12-09

## 2020-12-09 ENCOUNTER — PATIENT MESSAGE (OUTPATIENT)
Dept: FAMILY MEDICINE | Facility: CLINIC | Age: 50
End: 2020-12-09

## 2020-12-10 RX ORDER — OMEGA-3-ACID ETHYL ESTERS 1 G/1
2 CAPSULE, LIQUID FILLED ORAL 2 TIMES DAILY
Qty: 360 CAPSULE | Refills: 3 | Status: SHIPPED | OUTPATIENT
Start: 2020-12-10 | End: 2022-01-10

## 2020-12-10 NOTE — TELEPHONE ENCOUNTER
Start lovaza    1. Hypertriglyceridemia  - omega-3 acid ethyl esters (LOVAZA) 1 gram capsule; Take 2 capsules (2 g total) by mouth 2 (two) times daily.  Dispense: 360 capsule; Refill: 3

## 2020-12-13 ENCOUNTER — PATIENT MESSAGE (OUTPATIENT)
Dept: ENDOCRINOLOGY | Facility: CLINIC | Age: 50
End: 2020-12-13

## 2020-12-14 ENCOUNTER — PATIENT MESSAGE (OUTPATIENT)
Dept: FAMILY MEDICINE | Facility: CLINIC | Age: 50
End: 2020-12-14

## 2020-12-14 ENCOUNTER — PATIENT MESSAGE (OUTPATIENT)
Dept: PRIMARY CARE CLINIC | Facility: CLINIC | Age: 50
End: 2020-12-14

## 2020-12-14 NOTE — PROGRESS NOTES
Blood sugar much better controlled.  Other electrolyte abnormalities have normalized.  Pancreas function is normal

## 2020-12-16 ENCOUNTER — OFFICE VISIT (OUTPATIENT)
Dept: CARDIOLOGY | Facility: CLINIC | Age: 50
End: 2020-12-16
Payer: COMMERCIAL

## 2020-12-16 VITALS
HEART RATE: 74 BPM | SYSTOLIC BLOOD PRESSURE: 126 MMHG | DIASTOLIC BLOOD PRESSURE: 81 MMHG | WEIGHT: 309.31 LBS | OXYGEN SATURATION: 96 % | BODY MASS INDEX: 39.71 KG/M2

## 2020-12-16 DIAGNOSIS — E11.9 TYPE 2 DIABETES MELLITUS WITHOUT COMPLICATION, WITHOUT LONG-TERM CURRENT USE OF INSULIN: Primary | ICD-10-CM

## 2020-12-16 DIAGNOSIS — G47.30 OBESITY WITH SLEEP APNEA: ICD-10-CM

## 2020-12-16 DIAGNOSIS — I10 ESSENTIAL HYPERTENSION: ICD-10-CM

## 2020-12-16 DIAGNOSIS — E78.2 MIXED HYPERLIPIDEMIA: ICD-10-CM

## 2020-12-16 DIAGNOSIS — E78.1 HYPERTRIGLYCERIDEMIA: ICD-10-CM

## 2020-12-16 DIAGNOSIS — E66.9 OBESITY WITH SLEEP APNEA: ICD-10-CM

## 2020-12-16 DIAGNOSIS — I10 HTN (HYPERTENSION): ICD-10-CM

## 2020-12-16 PROCEDURE — 93000 EKG 12-LEAD: ICD-10-PCS | Mod: S$GLB,,, | Performed by: INTERNAL MEDICINE

## 2020-12-16 PROCEDURE — 99999 PR PBB SHADOW E&M-EST. PATIENT-LVL IV: ICD-10-PCS | Mod: PBBFAC,,, | Performed by: INTERNAL MEDICINE

## 2020-12-16 PROCEDURE — 99999 PR PBB SHADOW E&M-EST. PATIENT-LVL IV: CPT | Mod: PBBFAC,,, | Performed by: INTERNAL MEDICINE

## 2020-12-16 PROCEDURE — 99204 OFFICE O/P NEW MOD 45 MIN: CPT | Mod: S$GLB,,, | Performed by: INTERNAL MEDICINE

## 2020-12-16 PROCEDURE — 99204 PR OFFICE/OUTPT VISIT, NEW, LEVL IV, 45-59 MIN: ICD-10-PCS | Mod: S$GLB,,, | Performed by: INTERNAL MEDICINE

## 2020-12-16 PROCEDURE — 93000 ELECTROCARDIOGRAM COMPLETE: CPT | Mod: S$GLB,,, | Performed by: INTERNAL MEDICINE

## 2020-12-16 NOTE — PROGRESS NOTES
[facility] Cardiology     Subjective:    Patient ID:  Nael Bravo is a 50 y.o. male who presents for evaluation of Hyperlipidemia and Hypertension    Review of patient's allergies indicates:  No Known Allergies   The patient recently had development of severe diabetes mellitus with hemoglobin A1c 13 range.  Insulin was started.  He had a lipid profile.  He had elevated triglycerides in the 1400 range with HDL in the 20 range total cholesterol 400 range.  Fenofibrate was started by endocrinology.  He has longstanding sleep apnea.  He has been exercising and losing weight.  He has a history of hypertension.  He states his blood pressure readings have been stable with losartan HCTZ.  He does occasionally feel palpitations.  There is a history of fatty liver disease.  Thus far he is tolerating fenofibrate well.  He has no shortness of breath or chest pain with activity.  He has a son and he plays sports with him.  He is in the process of going back to the gym.  Currently he has some back pain problems.  He has never required stress testing.    His 12 lead Electrocardiogram performed today independently interpreted by myself confirms a normal tracing.  He saw a cardiologist 10 years ago due to palpitations.  He had an echo.  He was told his echo was normal.      Review of Systems   Constitution: Positive for weight gain. Negative for chills, decreased appetite, diaphoresis, fever, malaise/fatigue, night sweats and weight loss.   HENT: Positive for congestion. Negative for ear discharge, ear pain, hearing loss, hoarse voice, nosebleeds, odynophagia, sore throat, stridor and tinnitus.    Eyes: Negative for blurred vision, discharge, double vision, pain, photophobia, redness, vision loss in left eye, vision loss in right eye, visual disturbance and visual halos.   Cardiovascular: Positive for palpitations. Negative for chest pain, claudication, cyanosis,  dyspnea on exertion, irregular heartbeat, leg swelling, near-syncope, orthopnea, paroxysmal nocturnal dyspnea and syncope.   Respiratory: Negative for cough, hemoptysis, shortness of breath, sleep disturbances due to breathing, snoring, sputum production and wheezing.    Endocrine: Negative for cold intolerance, heat intolerance, polydipsia, polyphagia and polyuria.   Hematologic/Lymphatic: Negative for adenopathy and bleeding problem. Does not bruise/bleed easily.   Skin: Negative for color change, dry skin, flushing, itching, nail changes, poor wound healing, rash, skin cancer, suspicious lesions and unusual hair distribution.   Musculoskeletal: Positive for back pain. Negative for arthritis, falls, gout, joint pain, joint swelling, muscle cramps, muscle weakness, myalgias, neck pain and stiffness.   Gastrointestinal: Negative for bloating, abdominal pain, anorexia, change in bowel habit, bowel incontinence, constipation, diarrhea, dysphagia, excessive appetite, flatus, heartburn, hematemesis, hematochezia, hemorrhoids, jaundice, melena, nausea and vomiting.   Genitourinary: Negative for bladder incontinence, decreased libido, dysuria, flank pain, frequency, genital sores, hematuria, hesitancy, incomplete emptying, nocturia and urgency.   Neurological: Negative for aphonia, brief paralysis, difficulty with concentration, disturbances in coordination, excessive daytime sleepiness, dizziness, focal weakness, headaches, light-headedness, loss of balance, numbness, paresthesias, seizures, sensory change, tremors, vertigo and weakness.   Psychiatric/Behavioral: Negative for altered mental status, depression, hallucinations, memory loss, substance abuse, suicidal ideas and thoughts of violence. The patient does not have insomnia and is not nervous/anxious.    Allergic/Immunologic: Negative for hives and persistent infections.        Objective:       Vitals:    12/16/20 0938   BP: 126/81   BP Location: Right arm    Patient Position: Sitting   BP Method: Large (Automatic)   Pulse: 74   SpO2: 96%   Weight: (!) 140.3 kg (309 lb 4.9 oz)    Physical Exam   Constitutional: He is oriented to person, place, and time. He appears well-developed and well-nourished. No distress.   HENT:   Head: Normocephalic and atraumatic.   Nose: Nose normal.   Mouth/Throat: Oropharynx is clear and moist.   Eyes: Pupils are equal, round, and reactive to light. Conjunctivae and EOM are normal. Right eye exhibits no discharge. No scleral icterus.   Neck: Normal range of motion. Neck supple. No JVD present. No tracheal deviation present. No thyromegaly present.   Cardiovascular: Normal rate, regular rhythm, normal heart sounds and intact distal pulses. Exam reveals no gallop and no friction rub.   No murmur heard.  Pulmonary/Chest: Effort normal and breath sounds normal. No stridor. No respiratory distress. He has no wheezes. He has no rales. He exhibits no tenderness.   Abdominal: Soft. Bowel sounds are normal. He exhibits no distension and no mass. There is no abdominal tenderness. There is no rebound and no guarding.   Musculoskeletal: Normal range of motion.         General: No tenderness or edema.   Lymphadenopathy:     He has no cervical adenopathy.   Neurological: He is alert and oriented to person, place, and time. No cranial nerve deficit. Coordination normal.   Skin: Skin is warm and dry. No rash noted. He is not diaphoretic. No erythema. No pallor.   Psychiatric: He has a normal mood and affect. His behavior is normal. Judgment and thought content normal.         Assessment:       1. Type 2 diabetes mellitus without complication, without long-term current use of insulin    2. Mixed hyperlipidemia    3. Obesity with sleep apnea    4. Hypertriglyceridemia    5. Essential hypertension    6. HTN (hypertension)      Results for orders placed or performed in visit on 12/07/20   Comprehensive Metabolic Panel   Result Value Ref Range    Sodium 141 136  - 145 mmol/L    Potassium 3.9 3.5 - 5.1 mmol/L    Chloride 98 95 - 110 mmol/L    CO2 27 23 - 29 mmol/L    Glucose 183 (H) 70 - 110 mg/dL    BUN 10 6 - 20 mg/dL    Creatinine 1.1 0.5 - 1.4 mg/dL    Calcium 9.8 8.7 - 10.5 mg/dL    Total Protein 7.3 6.0 - 8.4 g/dL    Albumin 3.8 3.5 - 5.2 g/dL    Total Bilirubin 0.9 0.1 - 1.0 mg/dL    Alkaline Phosphatase 78 55 - 135 U/L    AST 41 (H) 10 - 40 U/L    ALT 62 (H) 10 - 44 U/L    Anion Gap 16 8 - 16 mmol/L    eGFR if African American >60 >60 mL/min/1.73 m^2    eGFR if non African American >60 >60 mL/min/1.73 m^2   Lipase   Result Value Ref Range    Lipase 31 4 - 60 U/L   Amylase   Result Value Ref Range    Amylase 64 20 - 110 U/L     Lab Results   Component Value Date    WBC 8.47 11/24/2020    RBC 5.36 11/24/2020    HGB 16.0 11/24/2020    HCT 45.9 11/24/2020    MCV 86 11/24/2020    MCH 29.9 11/24/2020    MCHC 34.9 11/24/2020    RDW 13.2 11/24/2020     11/24/2020    MPV 12.5 11/24/2020    GRAN 4.8 11/24/2020    GRAN 57.1 11/24/2020    LYMPH 2.6 11/24/2020    LYMPH 31.2 11/24/2020    MONO 0.5 11/24/2020    MONO 6.3 11/24/2020    EOS 0.3 11/24/2020    BASO 0.11 11/24/2020    EOSINOPHIL 3.5 11/24/2020    BASOPHIL 1.3 11/24/2020        CMP  Lab Results   Component Value Date     12/07/2020    K 3.9 12/07/2020    CL 98 12/07/2020    CO2 27 12/07/2020     (H) 12/07/2020    BUN 10 12/07/2020    CREATININE 1.1 12/07/2020    CALCIUM 9.8 12/07/2020    PROT 7.3 12/07/2020    ALBUMIN 3.8 12/07/2020    BILITOT 0.9 12/07/2020    ALKPHOS 78 12/07/2020    AST 41 (H) 12/07/2020    ALT 62 (H) 12/07/2020    ANIONGAP 16 12/07/2020    ESTGFRAFRICA >60 12/07/2020    EGFRNONAA >60 12/07/2020        Lab Results   Component Value Date    LABBLOO No growth after 5 days. 02/20/2019    LABURIN NO GROWTH AFTER 48 HOURS. 02/19/2013            Results for orders placed or performed during the hospital encounter of 02/19/13   EKG 12-lead    Collection Time: 02/19/13 11:31 PM     Narrative    Test Reason : ABDOMINAL PAIN  Blood Pressure : 130/60 mmHG  Vent. Rate : 058 BPM     Atrial Rate : 058 BPM     P-R Int : 196 ms          QRS Dur : 092 ms      QT Int : 424 ms       P-R-T Axes : 033 031 019 degrees     QTc Int : 416 ms    Sinus bradycardia  Otherwise normal ECG  No previous ECGs available  Confirmed by Alexis Padilla MD ([id number]) on 2/20/2013 5:09:09 PM    Referred By:  SELF REFERRED           Overread By: Alexis Padilla MD        Mammo Digital Diagnostic Bilat w/ Rob 09/20/2019 [id number] Final   CT Pelvis With Contrast 02/20/2019 [id number] Final   X-Ray Abdomen Flat And Erect 02/20/2013 [id number] Final            Plan:       Problem List Items Addressed This Visit        Cardiac/Vascular    Essential hypertension     His readings are normal on his current therapy. No changes made.         Relevant Orders    IN OFFICE EKG 12-LEAD (to Muse)    Hypertriglyceridemia     Weight loss advised. His DM control is improving.          Mixed hyperlipidemia     A direct LDL and repeat Lipid profile are set up in 2 weeks. Fenofibrate well tolerated thus far. Fish oil therapy to be started soon.         Relevant Orders    LDL Cholesterol, Direct Measurement    Hepatic function panel       Endocrine    Type 2 diabetes mellitus without complication, without long-term current use of insulin - Primary       Other    Obesity with sleep apnea     Weight loss advised.           Other Visit Diagnoses     HTN (hypertension)        Relevant Orders    IN OFFICE EKG 12-LEAD (to [facility])    IN OFFICE EKG 12-LEAD (to Muse)             Follow-up arranged in 6 months.      Thank you for allowing me to participate in your patient's care.        Liban Adam MD  12/16/2020   11:51 AM

## 2020-12-16 NOTE — PATIENT INSTRUCTIONS
Understanding Your Cholesterol Numbers  The higher your blood cholesterol, the greater your risk for heart attack (acute myocardial infarction), cardiovascular disease, or stroke. High cholesterol can cause any artery in your body to become narrow. Thats why you need to know your cholesterol level. If its high, you can take steps to bring it down. Eating the right foods and getting enough exercise can help. Some people also need medicine to control their cholesterol. Your healthcare provider can help you get started on a plan to control your cholesterol.        Blood flows easily when arteries are clear.      Less blood flows when cholesterol builds up in artery walls.   Checking your cholesterol  Your cholesterol is checked with a simple blood test. The results tell you how much cholesterol you have in your blood. Get checked as often as your healthcare provider suggests. If you have diabetes or high cholesterol, you may need your blood tested as often as every 3 months. As you work to lower your cholesterol, your numbers will change slowly. But they will change. Be patient and stay on track. Discuss your numbers with your healthcare provider.   Your total cholesterol number  A blood test will give you a number for the total amount of cholesterol in your blood. The higher this number, the more likely it is that cholesterol will build up in your blood vessels. Even if your cholesterol is just slightly high, you are at increased risk for health problems.  My total cholesterol is: ________________  Your lipid numbers  Total cholesterol is just one part of the story. Cholesterol is made up of different kinds of fats (lipids). If your total cholesterol is high, knowing your lipid profile is important. The 2 most important lipids are HDL and LDL. Lipids are checked after you have fasted. This means you dont eat for a certain amount of time before the test is done. Along with cholesterol, triglyceride can also lead  to blocked arteries. Triglycerides are another type of fat. Knowing your HDL, LDL, and triglyceride numbers, as well as your total cholesterol gives you a more complete picture of your cholesterol level:  · HDL is called the good cholesterol. It moves out of the bloodstream and does not block your blood vessels. HDL levels are affected by how much you exercise and what you eat.My HDL cholesterol is: ________________  · LDL is called the bad cholesterol. This is because it can stick to your artery walls and block blood flow. LDL levels are most affected by what you eat and by your genes.My LDL cholesterol is: ________________  · Triglyceride is a type of fat the body uses to store energy. Too much triglyceride can increase your risk for heart disease. Triglyceride levels should be under 150.My triglyceride is:  ________________  Based on your other health issues and risk factors, your healthcare provider may have specific goals for treating your cholesterol. You may need to use different kinds of medicines that work on the different types of cholesterol. Work with your provider to know what your goals of treatment are. Stick with your treatment plan to reach the goals you set.  High-risk groups  Some people may need to take medicines called statins to control their cholesterol. This is in addition to eating a healthy diet and getting regular exercise.  Statins can help you stay healthy. They can also help prevent a heart attack or stroke. You may need to take a statin if you are in one of these groups:  · Adults who have had a heart attack or stroke. Or adults who have had peripheral vascular disease, a ministroke (transient ischemic attack), or stable or unstable angina. This group also includes people who have had a procedure to restore blood flow through a blocked artery. These procedures include percutaneous coronary intervention, angioplasty, stent, and open-heart bypass surgery.  · Adults who have diabetes.  "Or adults who are at higher risk of having a heart attack or stroke and have an LDL cholesterol level of 70 to 189 mg/dL  · Adults who are 21 years old or older and have an LDL cholesterol level of 190 mg/dL or higher.  If you are in a high-risk group, talk with your healthcare provider about your treatment goals. Make sure you understand why these goals are important, based on your own health history and your family history of heart disease or high cholesterol.  Make a plan to have regular cholesterol checks. You may need to fast before getting this test. Also ask your provider about any side effects your medicines may cause. Let your provider know about any side effects you have. You may need to take more than one medicine to reach the cholesterol goals that you and your provider decide on.  Date Last Reviewed: 10/1/2016  © 4146-1788 [employer]. [address] [place] [address]. All rights reserved. This information is not intended as a substitute for professional medical care. Always follow your healthcare professional's instructions.        Triglycerides  Does this test have other names?  Lipid panel, fasting lipoprotein panel  What is this test?  This test measures the amount of triglycerides in your blood.  Triglycerides are one of several types of fats in your blood. Other kinds are LDL ("bad") cholesterol and HDL ("good") cholesterol.  Knowing your triglyceride level is important, especially if you have diabetes, are overweight or a smoker, or are mostly inactive. High triglyceride levels may put you at greater risk for a heart attack or stroke.    This test is part of a group of cholesterol and blood fat tests called a fasting lipoprotein panel, or lipid panel. This panel is recommended for all adults at least once every 5 years, or as recommended by your healthcare provider.  Knowing your triglyceride level helps your healthcare provider suggest healthy changes to your diet or " lifestyle. If you have triglycerides that are high to very high, your provider is more likely to prescribe medicines to lower your triglycerides or your LDL cholesterol.  Why do I need this test?  You may have this test as part of a routine checkup. You may also need this test if you're overweight, drink too much alcohol, rarely exercise, or have other conditions like high blood pressure or diabetes.  If you are on cholesterol-lowering medicines, you may have this test to see how well your treatment is working.  What other tests might I have along with this test?  Your healthcare provider will order screening tests for LDL, HDL, and total cholesterol.  What do my test results mean?  Many things may affect your lab test results. These include the method each lab uses to do the test. Even if your test results are different from the normal value, you may not have a problem. To learn what the results mean for you, talk with your healthcare provider.  Results are given in milligrams per deciliter (mg/dL). Normal levels of triglycerides are less than 150 mg/dL.  Here are how higher numbers are classified:  · 150 to 199 mg/dL: Borderline high  · 200 to 499 mg/dL: High  · 500 mg/dL and above: Very high  If you have a high triglyceride level, you have a greater risk for heart attack and stroke.  A triglyceride level above 150 mg/dL also means that you may have an increased risk for metabolic syndrome. This is a cluster of symptoms including high blood pressure, high blood sugar, and high body fat around the waist. These symptoms have been linked to increased risk for diabetes, heart disease, and stroke.  High triglycerides levels can also be caused by certain diseases or inherited conditions.  If your triglyceride level is above 200 mg/dL, your healthcare provider may recommend that you:  · Lose weight  · Limit high-fat foods containing saturated fats. These are animal fats found in meat, butter, and whole milk.  · Limit  trans fats, which are found in many processed foods like chips and store-bought cookies  · Cut back on drinks with added sugars, such as soda  · Limit your alcohol intake  · Stop smoking  · Control your blood pressure  · Exercise for at least 30 minutes a day, 5 days a week  · Limit the calories from fat in your diet to 25% to 35% of your total intake  If your triglycerides are extremely high--above 500 mg/dL--you may have an added risk for problems with your pancreas. You will likely need medicine to lower your levels along with recommended changes in diet and lifestyle.  How is this test done?  The test requires a blood sample, which is drawn through a needle from a vein in your arm.  Does this test pose any risks?  Taking a blood sample with a needle carries risks that include bleeding, infection, bruising, or feeling dizzy. When the needle pricks your arm, you may feel a slight stinging sensation or pain. Afterward, the site may be slightly sore.  What might affect my test results?  Not fasting for the required length of time before the test can affect your results. Certain medicines can affect your results, as can drinking alcohol.  How do I prepare for the test?  If you have this test as part of a cholesterol screening, you will need to not eat or drink anything but water for 9 to 14 hours before the test. In addition, be sure your healthcare provider knows about all medicines, herbs, vitamins, and supplements you are taking. This includes medicines that don't need a prescription and any illicit drugs you may use.     © 2616-4756 [employer]. [address] [place] [address], [place] [address]. All rights reserved. This information is not intended as a substitute for professional medical care. Always follow your healthcare professional's instructions.        Metabolic Syndrome  Metabolic syndrome is a set of five health factors that can lead to serious health problems. The factors greatly increase your  risk for diabetes, heart attack, or stroke.  What is metabolic syndrome?  Metabolic syndrome means you have three or more of the following five health factors:  · Extra weight with a large waist. Being overweight or obese means that you weigh too much for what is healthy for your height. A large waist size is 40 inches or more for men, 35 inches or more for women.  · High blood pressure (hypertension). Blood pressure is the force of blood against the inside of your blood vessels as your heart pumps. High blood pressure is a measurement of 130/85 mmHg or higher. This makes your heart work harder. And it may damage your blood vessels.  · High triglyceride level. Triglycerides are fats in the blood. A high triglyceride level is 150 mg/dL or more. If your level is high, you may have fatty deposits on the walls of your blood vessels.  · Low HDL cholesterol. HDL is known as good cholesterol. It protects your blood vessels from harmful LDL cholesterol. Low HDL cholesterol means less than 40 mg/dL for men or 50 mg/dL for women. If your level is low, your blood vessels are not as protected.  · High blood sugar (glucose). This means you have too much glucose in your blood. This happens when your body is unable to break down glucose as it should. Too much fasting glucose is 100 mg/dL or more. The extra glucose in the blood can damage the blood vessels.  Symptoms of metabolic syndrome  Most of the health factors that make up metabolic syndrome dont have symptoms, other than extra weight and waist size.  Diagnosing metabolic syndrome  Your healthcare provider will diagnose metabolic syndrome based on a physical exam and tests. He or she will check your weight and waist size. Your blood pressure will be measured with a blood pressure cuff. Blood tests will be done to check your levels of triglycerides, HDL cholesterol, and blood glucose.  Treatment for metabolic syndrome  The goals of treatment for metabolic syndrome are  to:  · Lower your risk for heart disease and stroke  · Prevent or manage type 2 diabetes  Treatment for metabolic syndrome includes:  · Healthy lifestyle changes. This includes losing the extra weight, being physically active, following a healthy diet, and quitting smoking. Get help from your healthcare provider to make these changes. He or she can give you information, offer support, and recommend other resources or specialists.  · Medicines. You may take medicine to control blood pressure, cholesterol, or blood sugar levels. You may also take other medicines to reduce your risk for heart and blood vessel problems.  Possible complications of metabolic syndrome  If not treated, metabolic syndrome can lead to:  · Diabetes. This condition can lead to kidney disease, vision loss, and foot or leg amputation.  · Heart attack (myocardial infarction)  · Stroke  · Other heart and blood vessel problems (cardiovascular disease)  · Obstructive sleep apnea  · Chronic kidney disease  · Gout   Can metabolic syndrome be prevented?  In many cases, metabolic syndrome can be prevented by having a healthy lifestyle.  Sticking to healthy changes  Making lifestyle changes isnt always easy. Make sure to:  · Create a plan with the help of your healthcare provider.  · See your healthcare provider regularly. Make changes to your plan as needed.  · Keep in mind that you will have times where its hard to stick to your plan.  · Work at it every day. Talk with your healthcare provider if you need help.  Date Last Reviewed: 6/1/2016  © 1516-8380 [employer]. [address] [place] [address], [place] [address]. All rights reserved. This information is not intended as a substitute for professional medical care. Always follow your healthcare professional's instructions.

## 2020-12-16 NOTE — ASSESSMENT & PLAN NOTE
A direct LDL and repeat Lipid profile are set up in 2 weeks. Fenofibrate well tolerated thus far. Fish oil therapy to be started soon.

## 2020-12-16 NOTE — LETTER
December 16, 2020      Elpidio Robertson MD  [address]  [place] [address]           [place] - Cardiology  [address], [address]  [facility] [address]  Phone: [phone]          Patient: Nael Bravo   MR Number: [id number]   YOB: 1970   Date of Visit: 12/16/2020       Dear Dr. Elpidio Robertson:    Thank you for referring Nael Bravo to me for evaluation. Attached you will find relevant portions of my assessment and plan of care.    If you have questions, please do not hesitate to call me. I look forward to following Nael Bravo along with you.    Sincerely,    Liban Adam MD    Enclosure  CC:  No Recipients    If you would like to receive this communication electronically, please contact [email or web address] or [phone] to request more information on [employer] Link access.    For providers and/or their staff who would like to refer a patient to [facility], please contact us through our one-stop-shop provider referral line, [facility], at [phone].    If you feel you have received this communication in error or would no longer like to receive these types of communications, please e-mail [email or web address]

## 2020-12-17 ENCOUNTER — PATIENT MESSAGE (OUTPATIENT)
Dept: ENDOCRINOLOGY | Facility: CLINIC | Age: 50
End: 2020-12-17

## 2020-12-17 ENCOUNTER — CLINICAL SUPPORT (OUTPATIENT)
Dept: DIABETES | Facility: CLINIC | Age: 50
End: 2020-12-17
Payer: COMMERCIAL

## 2020-12-17 VITALS — WEIGHT: 309 LBS | BODY MASS INDEX: 39.67 KG/M2

## 2020-12-17 DIAGNOSIS — E11.9 TYPE 2 DIABETES MELLITUS WITHOUT COMPLICATION, WITHOUT LONG-TERM CURRENT USE OF INSULIN: ICD-10-CM

## 2020-12-17 PROCEDURE — 99999 PR PBB SHADOW E&M-EST. PATIENT-LVL I: CPT | Mod: PBBFAC,,,

## 2020-12-17 PROCEDURE — G0108 PR DIAB MANAGE TRN  PER INDIV: ICD-10-PCS | Mod: S$GLB,,, | Performed by: INTERNAL MEDICINE

## 2020-12-17 PROCEDURE — 99999 PR PBB SHADOW E&M-EST. PATIENT-LVL I: ICD-10-PCS | Mod: PBBFAC,,,

## 2020-12-17 PROCEDURE — G0108 DIAB MANAGE TRN  PER INDIV: HCPCS | Mod: S$GLB,,, | Performed by: INTERNAL MEDICINE

## 2020-12-17 NOTE — PROGRESS NOTES
Pt came to clinic for fu visit. Pt is doing very well with his bs's. He had a few spikes and one low but his numbers have come down nicely. Pt has been measuring and weighing everything. Pt has noticed that potatoes tend to make his bs's higher and so therefore he will avoid them. Pt is taking all of his medications as ordered. 24 hour meal recall showed that pt had egg, toast, grits and turkey for breakfast, ham sandwich with lettuce, baked chips and orange for lunch and chicken breast with salad for dinner. Pt snacks on veggies. Discussed with pt how to make his meals more balanced. Discussed with pt how to make the better choices when eating out since he will start to travel again soon. Pt states that he is feeling much better.Pt drinks water. Pt will continue to work on meal plan. Pt is working on studying how certain foods effect his bs's . Pts questions were addressed and answered. Discussed the challenges of holiday eating with pt. Pt was advised to call for any problems or questions. Pt will fu in 2 months.

## 2020-12-18 ENCOUNTER — OFFICE VISIT (OUTPATIENT)
Dept: OPTOMETRY | Facility: CLINIC | Age: 50
End: 2020-12-18
Payer: COMMERCIAL

## 2020-12-18 ENCOUNTER — PATIENT OUTREACH (OUTPATIENT)
Dept: ADMINISTRATIVE | Facility: OTHER | Age: 50
End: 2020-12-18

## 2020-12-18 DIAGNOSIS — E11.9 TYPE 2 DIABETES MELLITUS WITHOUT COMPLICATION, WITHOUT LONG-TERM CURRENT USE OF INSULIN: ICD-10-CM

## 2020-12-18 DIAGNOSIS — H52.03 HYPEROPIA WITH PRESBYOPIA OF BOTH EYES: Primary | ICD-10-CM

## 2020-12-18 DIAGNOSIS — H52.4 HYPEROPIA WITH PRESBYOPIA OF BOTH EYES: Primary | ICD-10-CM

## 2020-12-18 PROCEDURE — 92004 COMPRE OPH EXAM NEW PT 1/>: CPT | Mod: S$GLB,,, | Performed by: OPTOMETRIST

## 2020-12-18 PROCEDURE — 92004 PR EYE EXAM, NEW PATIENT,COMPREHESV: ICD-10-PCS | Mod: S$GLB,,, | Performed by: OPTOMETRIST

## 2020-12-18 PROCEDURE — 99999 PR PBB SHADOW E&M-EST. PATIENT-LVL II: CPT | Mod: PBBFAC,,, | Performed by: OPTOMETRIST

## 2020-12-18 PROCEDURE — 99999 PR PBB SHADOW E&M-EST. PATIENT-LVL II: ICD-10-PCS | Mod: PBBFAC,,, | Performed by: OPTOMETRIST

## 2020-12-18 PROCEDURE — 92015 PR REFRACTION: ICD-10-PCS | Mod: S$GLB,,, | Performed by: OPTOMETRIST

## 2020-12-18 PROCEDURE — 92015 DETERMINE REFRACTIVE STATE: CPT | Mod: S$GLB,,, | Performed by: OPTOMETRIST

## 2020-12-18 NOTE — PROGRESS NOTES
Chart reviewed.   Immunizations: triggered  Orders placed: n/a  Upcoming appts to satisfy AYE topics: Optometry 12/18

## 2020-12-18 NOTE — PROGRESS NOTES
HPI     Annual diabetic eye exam  Blurry vision distance and near   Hemoglobin A1C       Date                     Value               Ref Range             Status                11/24/2020               13.6 (H)            4.0 - 5.6 %           Final            (-)Flashes (-)Floaters  (-)Itch, (-)tear, (-)burn, (-)Dryness. (-) OTC Drops   (-)Photophobia  (-)Glare (-)diplopia (-) headaches          Last edited by SHELLIE Lemon on 12/18/2020  2:06 PM. (History)            Assessment /Plan     For exam results, see Encounter Report.    Type 2 diabetes mellitus without complication, without long-term current use of insulin  -     Ambulatory referral/consult to Optometry  -No retinopathy noted today.  Continued control with primary care physician and annual comprehensive eye exam.    Eyeglass Final Rx     Eyeglass Final Rx       Sphere Cylinder Axis Dist VA Add    Right +1.00 +0.50 055 20/20- +1.75    Left +1.00 Sphere  20/20 +1.75    Type: PAL    Expiration Date: 12/19/2021                RTC 1 yr

## 2020-12-18 NOTE — LETTER
December 18, 2020      Vidya Yuan MD  [address]  [facility] [address]           [facility] [place] Optometry 1st Fl  [address]  [facility] [address]  Phone: [phone]  Fax: [phone]          Patient: Nael Bravo   MR Number: [id number]   YOB: 1970   Date of Visit: 12/18/2020       Dear Dr. Vidya Yuan:    Thank you for referring Nael Bravo to me for evaluation. Attached you will find relevant portions of my assessment and plan of care.    If you have questions, please do not hesitate to call me. I look forward to following Nael Bravo along with you.    Sincerely,    Thompson Carpio, OD    Enclosure  CC:  No Recipients    If you would like to receive this communication electronically, please contact [email or web address] or [phone] to request more information on [facility] Link access.    For providers and/or their staff who would like to refer a patient to [facility], please contact us through our one-stop-shop provider referral line, [facility], at [phone].    If you feel you have received this communication in error or would no longer like to receive these types of communications, please e-mail [email or web address]

## 2020-12-19 NOTE — TELEPHONE ENCOUNTER
No new care gaps identified.  Powered by [employer]. Reference number: [id number]. 12/19/2020 2:15:08 PM   CST

## 2020-12-19 NOTE — TELEPHONE ENCOUNTER
Encounter details (provider/department) have been updated by [facility] staff  As of this time Protocols and CDM: Does not populate or display   Updated: Department  Of note. CDM should display. medication Is delegated and encounter details have been updated  Will resend refill request encounter to P Centralized Refill Staff Pool.   [facility]   Note composed:2:14 PM 12/19/2020

## 2020-12-22 RX ORDER — MONTELUKAST SODIUM 10 MG/1
TABLET ORAL
Qty: 90 TABLET | Refills: 3 | Status: SHIPPED | OUTPATIENT
Start: 2020-12-22 | End: 2022-06-21

## 2020-12-22 NOTE — PROGRESS NOTES
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       Pulmonology:  Leukotriene Inhibitors Failed - 12/19/2020  2:14 PM        Failed - An appropriate indication is on the problem list     Allergic Rhinitis  Sinusitis  Seasonal Allergies   Asthma              Passed - Patient is at least 18 years old        Passed - Office visit in past 12 months or future 90 days     Recent Outpatient Visits            4 days ago Hyperopia with presbyopia of both eyes    [facility] - Opt[facility] 1st Fl Thompson Carpio, OD    6 days ago Type 2 diabetes mellitus without complication, without long-term current use of insulin    [place] - Cardiology Liban Adam MD    2 weeks ago Type 2 diabetes mellitus without complication, without long-term current use of insulin    [facility] - Endo Diabetes 6th Fl Vidya Yuan MD    3 weeks ago Type 2 diabetes mellitus without complication, without long-term current use of insulin    [facility] - Primary Care Elpidio Robertson MD    1 month ago Annual physical exam    [facility] - Primary Care Elpidio Robertson MD          Future Appointments              In 1 week MD [place] - [facility] - [address], [place]    In 1 month APPOINTMENT LAB, [facility]    In 1 month MD [facility] - Endo Diabetes 6th Fl, [facility]    In 1 month Gary Coyne, RN [facility]- Diabetes PrimaryCareBldg, [facility]               Leukotriene Inhibitors Protocol Passed - 12/19/2020 12:04 PM        Passed - Visit with authorizing provider in past 12 months or upcoming 90 days               Appointments  past 12m or future 3m with PCP    Date Provider   Last Visit   11/30/2020 Elpidio Robertson MD   Next Visit   12/21/2020 Elpidio Robertson MD   ED visits in past 90 days: 0        Note composed:11:56 AM 12/22/2020

## 2020-12-29 ENCOUNTER — OFFICE VISIT (OUTPATIENT)
Dept: FAMILY MEDICINE | Facility: CLINIC | Age: 50
End: 2020-12-29
Payer: COMMERCIAL

## 2020-12-29 VITALS
SYSTOLIC BLOOD PRESSURE: 124 MMHG | TEMPERATURE: 97 F | BODY MASS INDEX: 39.84 KG/M2 | DIASTOLIC BLOOD PRESSURE: 78 MMHG | WEIGHT: 310.31 LBS | HEART RATE: 62 BPM | OXYGEN SATURATION: 95 %

## 2020-12-29 DIAGNOSIS — G47.33 OSA (OBSTRUCTIVE SLEEP APNEA): ICD-10-CM

## 2020-12-29 DIAGNOSIS — E11.9 TYPE 2 DIABETES MELLITUS WITHOUT COMPLICATION, WITHOUT LONG-TERM CURRENT USE OF INSULIN: ICD-10-CM

## 2020-12-29 DIAGNOSIS — Z23 NEED FOR PROPHYLACTIC VACCINATION AND INOCULATION AGAINST INFLUENZA: Primary | ICD-10-CM

## 2020-12-29 DIAGNOSIS — I10 ESSENTIAL HYPERTENSION: ICD-10-CM

## 2020-12-29 DIAGNOSIS — E78.2 MIXED HYPERLIPIDEMIA: ICD-10-CM

## 2020-12-29 PROCEDURE — 99999 PR PBB SHADOW E&M-EST. PATIENT-LVL IV: ICD-10-PCS | Mod: PBBFAC,,, | Performed by: INTERNAL MEDICINE

## 2020-12-29 PROCEDURE — 90686 FLU VACCINE (QUAD) GREATER THAN OR EQUAL TO 3YO PRESERVATIVE FREE IM: ICD-10-PCS | Mod: S$GLB,,, | Performed by: INTERNAL MEDICINE

## 2020-12-29 PROCEDURE — 99999 PR PBB SHADOW E&M-EST. PATIENT-LVL IV: CPT | Mod: PBBFAC,,, | Performed by: INTERNAL MEDICINE

## 2020-12-29 PROCEDURE — 90471 FLU VACCINE (QUAD) GREATER THAN OR EQUAL TO 3YO PRESERVATIVE FREE IM: ICD-10-PCS | Mod: S$GLB,,, | Performed by: INTERNAL MEDICINE

## 2020-12-29 PROCEDURE — 99214 OFFICE O/P EST MOD 30 MIN: CPT | Mod: 25,S$GLB,, | Performed by: INTERNAL MEDICINE

## 2020-12-29 PROCEDURE — 99214 PR OFFICE/OUTPT VISIT, EST, LEVL IV, 30-39 MIN: ICD-10-PCS | Mod: 25,S$GLB,, | Performed by: INTERNAL MEDICINE

## 2020-12-29 PROCEDURE — 90472 IMMUNIZATION ADMIN EACH ADD: CPT | Mod: S$GLB,,, | Performed by: INTERNAL MEDICINE

## 2020-12-29 PROCEDURE — 90686 IIV4 VACC NO PRSV 0.5 ML IM: CPT | Mod: S$GLB,,, | Performed by: INTERNAL MEDICINE

## 2020-12-29 PROCEDURE — 90732 PNEUMOCOCCAL POLYSACCHARIDE VACCINE 23-VALENT =>2YO SQ IM: ICD-10-PCS | Mod: S$GLB,,, | Performed by: INTERNAL MEDICINE

## 2020-12-29 PROCEDURE — 90732 PPSV23 VACC 2 YRS+ SUBQ/IM: CPT | Mod: S$GLB,,, | Performed by: INTERNAL MEDICINE

## 2020-12-29 PROCEDURE — 90472 PNEUMOCOCCAL POLYSACCHARIDE VACCINE 23-VALENT =>2YO SQ IM: ICD-10-PCS | Mod: S$GLB,,, | Performed by: INTERNAL MEDICINE

## 2020-12-29 PROCEDURE — 90471 IMMUNIZATION ADMIN: CPT | Mod: S$GLB,,, | Performed by: INTERNAL MEDICINE

## 2020-12-29 NOTE — PROGRESS NOTES
[facility] Note    Chief Complaint      Chief Complaint   Patient presents with    Follow-up       History of Present Illness      Nael Bravo is a 50 y.o. male with chronic conditions of DM2, HTN, GERD, DURGA, depression who presents today for: follow up new diagnosis diabetes and hypertriglyceridemia.  Established with Dr. Yuan, endocrinology.  Has been on metformin and changed diet.  Has lost weight.  Fasting sugars significantly improving 120-140s.  Dr. Yuan started on basaglar and fiasp. Mental fog, polyuria and polydipsia improved.  Has started on fenofibrate, fish oil for hypertriglyceridemia.  Has seen cardiology Dr. Adam.  Has lost 40 lbs.    Eye exam UTD with Dr. Carpio.  Flu shot due.  Pneumonia vaccine due.  Cscope scheduled with Dr. Craig.    Past Medical History:  Past Medical History:   Diagnosis Date    Mixed hyperlipidemia 12/16/2020 11/24/2020 LABS  TG 2914 HDL 20 Fenofibrate ordered 11/    Perirectal abscess     Perirectal abscess        Past Surgical History:   has a past surgical history that includes Appendectomy; Sinus surgery (2006); and Incision and drainage of perirectal region (Left, 2/20/2019).    Family History:  family history includes Breast cancer in his maternal grandmother and paternal grandmother; Hypertension in his father.     Social History:  Social History     Tobacco Use    Smoking status: Never Smoker    Smokeless tobacco: Never Used   Substance Use Topics    Alcohol use: No     Frequency: Monthly or less     Drinks per session: 1 or 2     Binge frequency: Never    Drug use: No       I personally reviewed all past medical, surgical, social and family history.    Review of Systems   Constitutional: Negative for chills, fever and malaise/fatigue.   HENT: Negative for hearing loss.    Eyes: Negative for discharge.   Respiratory: Negative for shortness of breath and wheezing.    Cardiovascular: Negative for chest pain and  "palpitations.   Gastrointestinal: Negative for blood in stool, constipation, diarrhea, nausea and vomiting.   Genitourinary: Negative for hematuria and urgency.   Musculoskeletal: Negative for neck pain.   Skin: Negative for rash.   Neurological: Negative for weakness and headaches.   Endo/Heme/Allergies: Negative for polydipsia.   All other systems reviewed and are negative.       Medications:  Outpatient Encounter Medications as of 12/29/2020   Medication Sig Dispense Refill    BD ULTRA-FINE OLIVER PEN NEEDLE 32 gauge x 5/32" Ndle Used to inject insulin 4 times a day 200 each 3    blood sugar diagnostic Strp Test once daily.  Dx E11.9.  Any formulary preferred brand. 100 each 11    blood-glucose meter kit Test once daily.  Dx E11.9.  Any formulary preferred brand. 1 each 0    fenofibrate 160 MG Tab Take 1 tablet (160 mg total) by mouth once daily. 90 tablet 3    insulin (BASAGLAR KWIKPEN U-100 INSULIN) glargine 100 units/mL (3mL) SubQ pen Inject 40 units under the skin nightly.  Increase per MD instruction up to max daily dose of 60 units 15 mL 3    insulin aspart, niacinamide, (FIASP FLEXTOUCH U-100 INSULIN) 100 unit/mL (3 mL) InPn Inject 8 units under the skin 3 times a day before meals in addition to sliding scale, maximum daily dose 60 units per day 15 mL 3    lancets Misc Test once daily.  Dx E11.9.  Any formulary preferred brand. 100 each 11    losartan-hydrochlorothiazide 50-12.5 mg (HYZAAR) 50-12.5 mg per tablet Take 2 tablets by mouth once daily. 180 tablet 3    metFORMIN (GLUCOPHAGE) 1000 MG tablet Take 1 tablet (1,000 mg total) by mouth 2 (two) times daily with meals. 180 tablet 3    montelukast (SINGULAIR) 10 mg tablet Take 1 tablet(s) every day by oral route at bedtime for 30 days. 90 tablet 3    omega-3 acid ethyl esters (LOVAZA) 1 gram capsule Take 2 capsules (2 g total) by mouth 2 (two) times daily. 360 capsule 3    pantoprazole (PROTONIX) 40 MG tablet TAKE 1 TABLET BY MOUTH DAILY 90 " tablet 3    SURE COMFORT LANCETS 28 gauge Misc Test once daily. Dx E11.9. Any formulary preferred brand.      venlafaxine (EFFEXOR) 25 MG Tab TAKE 1 TABLET BY MOUTH DAILY 90 tablet 3    [DISCONTINUED] linaGLIPtin (TRADJENTA) 5 mg Tab tablet Take 1 tablet (5 mg total) by mouth once daily. (Patient not taking: Reported on 12/16/2020) 90 tablet 3     No facility-administered encounter medications on file as of 12/29/2020.        Allergies:  Review of patient's allergies indicates:  No Known Allergies    Health Maintenance:  Immunization History   Administered Date(s) Administered    Influenza - Trivalent (ADULT) 03/08/2016      Health Maintenance   Topic Date Due    TETANUS VACCINE  11/17/1986    Pneumococcal Vaccine (Medium Risk) (1 of 1 - PPSV23) 11/17/1987    Low Dose Statin  11/17/1989    Hemoglobin A1c  05/24/2021    Lipid Panel  11/24/2021    Foot Exam  12/04/2021    Eye Exam  12/18/2021    Hepatitis C Screening  Completed        Physical Exam      Vital Signs  Temp: 97.3 °F (36.3 °C)  Temp src: Temporal  Pulse: 62  SpO2: 95 %  BP: 124/78  BP Location: Left arm  Patient Position: Sitting  Height and Weight  Weight: (!) 140.8 kg (310 lb 4.8 oz)]    Physical Exam  Vitals signs reviewed.   Constitutional:       Appearance: He is well-developed.   HENT:      Head: Normocephalic and atraumatic.      Right Ear: External ear normal.      Left Ear: External ear normal.   Eyes:      Conjunctiva/sclera: Conjunctivae normal.      Pupils: Pupils are equal, round, and reactive to light.   Cardiovascular:      Rate and Rhythm: Normal rate and regular rhythm.      Heart sounds: Normal heart sounds. No murmur.   Pulmonary:      Effort: Pulmonary effort is normal.      Breath sounds: Normal breath sounds. No wheezing or rales.   Abdominal:      General: Bowel sounds are normal. There is no distension or abdominal bruit.      Palpations: Abdomen is soft.      Tenderness: There is no abdominal tenderness.           Laboratory:  CBC:  Recent Labs   Lab 02/20/19  1022 11/24/20  0855   WBC 8.73 8.47   RBC 5.11 5.36   Hemoglobin 14.3 16.0   Hematocrit 45.0 45.9   Platelets 273 249   MCV 88 86   MCH 28.0 29.9   MCHC 31.8 L 34.9     CMP:  Recent Labs   Lab 02/20/19  1022 11/24/20  0855 12/07/20  0810   Glucose 115 H 426 H 183 H   Calcium 9.7 9.6 9.8   Albumin 3.9 4.1 3.8   Total Protein 7.3 11.8 H 7.3   Sodium 141 127 L 141   Potassium 4.3 5.5 H 3.9   CO2 23 14 L 27   Chloride 108 91 L 98   BUN 13 16 10   Alkaline Phosphatase 90 103 78   ALT 50 H 61 H 62 H   AST 24 64 H 41 H   Total Bilirubin 0.5 0.6 0.9     URINALYSIS:       LIPIDS:  Recent Labs   Lab 11/24/20  0855   TSH 2.342   HDL 20 L   Cholesterol 397 H   Triglycerides 2,914 H   LDL Cholesterol Invalid, Trig>400.0   HDL/Cholesterol Ratio 5.0 L   Non-HDL Cholesterol 377   Total Cholesterol/HDL Ratio 19.9 H     TSH:  Recent Labs   Lab 11/24/20  0855   TSH 2.342     A1C:  Recent Labs   Lab 11/24/20  0855   Hemoglobin A1C 13.6 H       Assessment/Plan     Nael Bravo is a 50 y.o.male with:    1. Type 2 diabetes mellitus without complication, without long-term current use of insulin  Continue current meds.   F/U with Dr. Yuan.    2. Essential hypertension  Continue current meds.    3. Mixed hyperlipidemia  Continue current meds.  F?U with cardiology  4. Need for prophylactic vaccination and inoculation against influenza  - Flu Vaccine - Quadrivalent *Preferred* (PF) (6 months & older)  - Pneumococcal Polysaccharide Vaccine (23 Valent) (SQ/IM)       Chronic conditions status updated as per HPI.  Other than changes above, cont current medications and maintain follow up with specialists.  Return to clinic in 3 months.    Elpidio Robertson MD  [facility]    Answers for HPI/ROS submitted by the patient on 12/27/2020   activity change: No  unexpected weight change: No  rhinorrhea: No  trouble swallowing: No  visual disturbance: Yes  chest tightness: No  polyuria:  No  difficulty urinating: No  joint swelling: No  arthralgias: No  confusion: No  dysphoric mood: No    Flu consent signed by patient. Flu vaccine administered.

## 2021-01-04 ENCOUNTER — PATIENT MESSAGE (OUTPATIENT)
Dept: ADMINISTRATIVE | Facility: HOSPITAL | Age: 51
End: 2021-01-04

## 2021-01-07 ENCOUNTER — PATIENT MESSAGE (OUTPATIENT)
Dept: ENDOCRINOLOGY | Facility: CLINIC | Age: 51
End: 2021-01-07

## 2021-01-07 DIAGNOSIS — E11.9 TYPE 2 DIABETES MELLITUS WITHOUT COMPLICATION, WITHOUT LONG-TERM CURRENT USE OF INSULIN: Primary | ICD-10-CM

## 2021-01-14 ENCOUNTER — PATIENT MESSAGE (OUTPATIENT)
Dept: ENDOCRINOLOGY | Facility: CLINIC | Age: 51
End: 2021-01-14

## 2021-01-14 RX ORDER — FLASH GLUCOSE SENSOR
KIT MISCELLANEOUS
Qty: 1 KIT | Refills: 0 | Status: SHIPPED | OUTPATIENT
Start: 2021-01-14 | End: 2021-11-23 | Stop reason: SDUPTHER

## 2021-01-14 RX ORDER — FLASH GLUCOSE SENSOR
1 KIT MISCELLANEOUS
Qty: 6 KIT | Refills: 3 | Status: SHIPPED | OUTPATIENT
Start: 2021-01-14 | End: 2022-07-05

## 2021-01-14 RX ORDER — FLASH GLUCOSE SCANNING READER
1 EACH MISCELLANEOUS ONCE
Qty: 1 EACH | Refills: 0 | Status: SHIPPED | OUTPATIENT
Start: 2021-01-14 | End: 2021-01-14

## 2021-01-20 ENCOUNTER — PATIENT MESSAGE (OUTPATIENT)
Dept: FAMILY MEDICINE | Facility: CLINIC | Age: 51
End: 2021-01-20

## 2021-01-21 ENCOUNTER — LAB VISIT (OUTPATIENT)
Dept: PRIMARY CARE CLINIC | Facility: OTHER | Age: 51
End: 2021-01-21
Attending: INTERNAL MEDICINE
Payer: COMMERCIAL

## 2021-01-21 DIAGNOSIS — J34.89 STUFFY AND RUNNY NOSE: ICD-10-CM

## 2021-01-21 PROCEDURE — U0003 INFECTIOUS AGENT DETECTION BY NUCLEIC ACID (DNA OR RNA); SEVERE ACUTE RESPIRATORY SYNDROME CORONAVIRUS 2 (SARS-COV-2) (CORONAVIRUS DISEASE [COVID-19]), AMPLIFIED PROBE TECHNIQUE, MAKING USE OF HIGH THROUGHPUT TECHNOLOGIES AS DESCRIBED BY CMS-2020-01-R: HCPCS

## 2021-01-22 LAB — SARS-COV-2 RNA RESP QL NAA+PROBE: NOT DETECTED

## 2021-01-26 ENCOUNTER — PATIENT OUTREACH (OUTPATIENT)
Dept: ADMINISTRATIVE | Facility: OTHER | Age: 51
End: 2021-01-26

## 2021-01-26 ENCOUNTER — LAB VISIT (OUTPATIENT)
Dept: LAB | Facility: HOSPITAL | Age: 51
End: 2021-01-26
Attending: INTERNAL MEDICINE
Payer: COMMERCIAL

## 2021-01-26 ENCOUNTER — PATIENT MESSAGE (OUTPATIENT)
Dept: CARDIOLOGY | Facility: CLINIC | Age: 51
End: 2021-01-26

## 2021-01-26 DIAGNOSIS — E11.9 TYPE 2 DIABETES MELLITUS WITHOUT COMPLICATION, WITHOUT LONG-TERM CURRENT USE OF INSULIN: ICD-10-CM

## 2021-01-26 LAB
ANION GAP SERPL CALC-SCNC: 10 MMOL/L (ref 8–16)
BUN SERPL-MCNC: 21 MG/DL (ref 6–20)
CALCIUM SERPL-MCNC: 9.3 MG/DL (ref 8.7–10.5)
CHLORIDE SERPL-SCNC: 102 MMOL/L (ref 95–110)
CHOLEST SERPL-MCNC: 188 MG/DL (ref 120–199)
CHOLEST/HDLC SERPL: 6.7 {RATIO} (ref 2–5)
CO2 SERPL-SCNC: 26 MMOL/L (ref 23–29)
CREAT SERPL-MCNC: 1.2 MG/DL (ref 0.5–1.4)
EST. GFR  (AFRICAN AMERICAN): >60 ML/MIN/1.73 M^2
EST. GFR  (NON AFRICAN AMERICAN): >60 ML/MIN/1.73 M^2
GLUCOSE SERPL-MCNC: 119 MG/DL (ref 70–110)
HDLC SERPL-MCNC: 28 MG/DL (ref 40–75)
HDLC SERPL: 14.9 % (ref 20–50)
LDLC SERPL CALC-MCNC: 114.8 MG/DL (ref 63–159)
NONHDLC SERPL-MCNC: 160 MG/DL
POTASSIUM SERPL-SCNC: 3.7 MMOL/L (ref 3.5–5.1)
SODIUM SERPL-SCNC: 138 MMOL/L (ref 136–145)
TRIGL SERPL-MCNC: 226 MG/DL (ref 30–150)

## 2021-01-26 PROCEDURE — 36415 COLL VENOUS BLD VENIPUNCTURE: CPT

## 2021-01-26 PROCEDURE — 80048 BASIC METABOLIC PNL TOTAL CA: CPT

## 2021-01-26 PROCEDURE — 80061 LIPID PANEL: CPT

## 2021-01-27 ENCOUNTER — PATIENT MESSAGE (OUTPATIENT)
Dept: FAMILY MEDICINE | Facility: CLINIC | Age: 51
End: 2021-01-27

## 2021-01-28 ENCOUNTER — OFFICE VISIT (OUTPATIENT)
Dept: ENDOCRINOLOGY | Facility: CLINIC | Age: 51
End: 2021-01-28
Payer: COMMERCIAL

## 2021-01-28 DIAGNOSIS — E11.9 TYPE 2 DIABETES MELLITUS WITHOUT COMPLICATION, WITHOUT LONG-TERM CURRENT USE OF INSULIN: Primary | ICD-10-CM

## 2021-01-28 DIAGNOSIS — E78.1 HYPERTRIGLYCERIDEMIA: ICD-10-CM

## 2021-01-28 DIAGNOSIS — I10 ESSENTIAL HYPERTENSION: ICD-10-CM

## 2021-01-28 PROCEDURE — 99214 PR OFFICE/OUTPT VISIT, EST, LEVL IV, 30-39 MIN: ICD-10-PCS | Mod: 95,,, | Performed by: INTERNAL MEDICINE

## 2021-01-28 PROCEDURE — 99214 OFFICE O/P EST MOD 30 MIN: CPT | Mod: 95,,, | Performed by: INTERNAL MEDICINE

## 2021-01-28 RX ORDER — ATORVASTATIN CALCIUM 40 MG/1
40 TABLET, FILM COATED ORAL DAILY
Qty: 90 TABLET | Refills: 3 | Status: CANCELLED | OUTPATIENT
Start: 2021-01-28 | End: 2022-01-28

## 2021-01-29 ENCOUNTER — PATIENT MESSAGE (OUTPATIENT)
Dept: ENDOCRINOLOGY | Facility: CLINIC | Age: 51
End: 2021-01-29

## 2021-01-31 RX ORDER — PRAVASTATIN SODIUM 40 MG/1
40 TABLET ORAL DAILY
Qty: 90 TABLET | Refills: 3 | Status: SHIPPED | OUTPATIENT
Start: 2021-01-31 | End: 2022-02-07

## 2021-02-26 ENCOUNTER — PATIENT OUTREACH (OUTPATIENT)
Dept: ADMINISTRATIVE | Facility: HOSPITAL | Age: 51
End: 2021-02-26

## 2021-02-26 ENCOUNTER — TELEPHONE (OUTPATIENT)
Dept: ADMINISTRATIVE | Facility: HOSPITAL | Age: 51
End: 2021-02-26

## 2021-03-19 ENCOUNTER — TELEPHONE (OUTPATIENT)
Dept: ADMINISTRATIVE | Facility: HOSPITAL | Age: 51
End: 2021-03-19

## 2021-03-19 ENCOUNTER — PATIENT OUTREACH (OUTPATIENT)
Dept: ADMINISTRATIVE | Facility: HOSPITAL | Age: 51
End: 2021-03-19

## 2021-03-31 ENCOUNTER — IMMUNIZATION (OUTPATIENT)
Dept: PHARMACY | Facility: CLINIC | Age: 51
End: 2021-03-31
Payer: COMMERCIAL

## 2021-03-31 ENCOUNTER — TELEPHONE (OUTPATIENT)
Dept: FAMILY MEDICINE | Facility: CLINIC | Age: 51
End: 2021-03-31

## 2021-03-31 DIAGNOSIS — Z23 NEED FOR VACCINATION: Primary | ICD-10-CM

## 2021-03-31 DIAGNOSIS — Z12.11 SCREEN FOR COLON CANCER: Primary | ICD-10-CM

## 2021-04-05 ENCOUNTER — PATIENT MESSAGE (OUTPATIENT)
Dept: ADMINISTRATIVE | Facility: HOSPITAL | Age: 51
End: 2021-04-05

## 2021-04-08 ENCOUNTER — TELEPHONE (OUTPATIENT)
Dept: DIABETES | Facility: CLINIC | Age: 51
End: 2021-04-08

## 2021-04-21 ENCOUNTER — PATIENT OUTREACH (OUTPATIENT)
Dept: ADMINISTRATIVE | Facility: HOSPITAL | Age: 51
End: 2021-04-21

## 2021-04-29 ENCOUNTER — PATIENT OUTREACH (OUTPATIENT)
Dept: ADMINISTRATIVE | Facility: HOSPITAL | Age: 51
End: 2021-04-29

## 2021-04-29 ENCOUNTER — TELEPHONE (OUTPATIENT)
Dept: FAMILY MEDICINE | Facility: CLINIC | Age: 51
End: 2021-04-29

## 2021-05-18 ENCOUNTER — PATIENT OUTREACH (OUTPATIENT)
Dept: ADMINISTRATIVE | Facility: HOSPITAL | Age: 51
End: 2021-05-18

## 2021-05-20 ENCOUNTER — IMMUNIZATION (OUTPATIENT)
Dept: INTERNAL MEDICINE | Facility: CLINIC | Age: 51
End: 2021-05-20

## 2021-05-20 DIAGNOSIS — Z23 NEED FOR VACCINATION: Primary | ICD-10-CM

## 2021-05-20 PROCEDURE — 91301 COVID-19, MRNA, LNP-S, PF, 100 MCG/0.5 ML DOSE VACCINE: ICD-10-PCS | Mod: S$GLB,,, | Performed by: INTERNAL MEDICINE

## 2021-05-20 PROCEDURE — 0012A COVID-19, MRNA, LNP-S, PF, 100 MCG/0.5 ML DOSE VACCINE: CPT | Mod: CV19,S$GLB,, | Performed by: INTERNAL MEDICINE

## 2021-05-20 PROCEDURE — 91301 COVID-19, MRNA, LNP-S, PF, 100 MCG/0.5 ML DOSE VACCINE: CPT | Mod: S$GLB,,, | Performed by: INTERNAL MEDICINE

## 2021-05-20 PROCEDURE — 0012A COVID-19, MRNA, LNP-S, PF, 100 MCG/0.5 ML DOSE VACCINE: ICD-10-PCS | Mod: CV19,S$GLB,, | Performed by: INTERNAL MEDICINE

## 2021-06-01 ENCOUNTER — PATIENT OUTREACH (OUTPATIENT)
Dept: ADMINISTRATIVE | Facility: OTHER | Age: 51
End: 2021-06-01

## 2021-06-03 ENCOUNTER — OFFICE VISIT (OUTPATIENT)
Dept: ENDOCRINOLOGY | Facility: CLINIC | Age: 51
End: 2021-06-03
Payer: COMMERCIAL

## 2021-06-03 ENCOUNTER — PATIENT MESSAGE (OUTPATIENT)
Dept: ENDOCRINOLOGY | Facility: CLINIC | Age: 51
End: 2021-06-03

## 2021-06-03 DIAGNOSIS — I10 ESSENTIAL HYPERTENSION: ICD-10-CM

## 2021-06-03 DIAGNOSIS — E78.1 HYPERTRIGLYCERIDEMIA: ICD-10-CM

## 2021-06-03 DIAGNOSIS — E11.9 TYPE 2 DIABETES MELLITUS WITHOUT COMPLICATION, WITHOUT LONG-TERM CURRENT USE OF INSULIN: ICD-10-CM

## 2021-06-03 PROCEDURE — 99214 OFFICE O/P EST MOD 30 MIN: CPT | Mod: 95,,, | Performed by: INTERNAL MEDICINE

## 2021-06-03 PROCEDURE — 99214 PR OFFICE/OUTPT VISIT, EST, LEVL IV, 30-39 MIN: ICD-10-PCS | Mod: 95,,, | Performed by: INTERNAL MEDICINE

## 2021-06-05 RX ORDER — VENLAFAXINE 25 MG/1
TABLET ORAL
Qty: 90 TABLET | Refills: 1 | Status: SHIPPED | OUTPATIENT
Start: 2021-06-05 | End: 2022-04-20

## 2021-06-22 ENCOUNTER — PATIENT MESSAGE (OUTPATIENT)
Dept: ENDOCRINOLOGY | Facility: CLINIC | Age: 51
End: 2021-06-22

## 2021-06-22 RX ORDER — SEMAGLUTIDE 1.34 MG/ML
1 INJECTION, SOLUTION SUBCUTANEOUS
Qty: 2 PEN | Refills: 11 | Status: SHIPPED | OUTPATIENT
Start: 2021-06-22 | End: 2022-06-21

## 2021-06-29 ENCOUNTER — PATIENT OUTREACH (OUTPATIENT)
Dept: ADMINISTRATIVE | Facility: HOSPITAL | Age: 51
End: 2021-06-29

## 2021-07-01 ENCOUNTER — LAB VISIT (OUTPATIENT)
Dept: LAB | Facility: HOSPITAL | Age: 51
End: 2021-07-01
Attending: INTERNAL MEDICINE
Payer: COMMERCIAL

## 2021-07-01 DIAGNOSIS — E11.9 TYPE 2 DIABETES MELLITUS WITHOUT COMPLICATION, WITHOUT LONG-TERM CURRENT USE OF INSULIN: ICD-10-CM

## 2021-07-01 LAB
ALBUMIN/CREAT UR: 6 UG/MG (ref 0–30)
CREAT UR-MCNC: 150 MG/DL (ref 23–375)
MICROALBUMIN UR DL<=1MG/L-MCNC: 9 UG/ML

## 2021-07-01 PROCEDURE — 82043 UR ALBUMIN QUANTITATIVE: CPT | Performed by: INTERNAL MEDICINE

## 2021-07-01 PROCEDURE — 82570 ASSAY OF URINE CREATININE: CPT | Performed by: INTERNAL MEDICINE

## 2021-07-26 ENCOUNTER — PATIENT MESSAGE (OUTPATIENT)
Dept: ENDOCRINOLOGY | Facility: CLINIC | Age: 51
End: 2021-07-26

## 2021-09-22 ENCOUNTER — OFFICE VISIT (OUTPATIENT)
Dept: OTOLARYNGOLOGY | Facility: CLINIC | Age: 51
End: 2021-09-22
Payer: COMMERCIAL

## 2021-09-22 DIAGNOSIS — J33.8 NASAL SINUS POLYP: Primary | ICD-10-CM

## 2021-09-22 DIAGNOSIS — R51.9 NONINTRACTABLE EPISODIC HEADACHE, UNSPECIFIED HEADACHE TYPE: ICD-10-CM

## 2021-09-22 DIAGNOSIS — J30.9 ALLERGIC RHINITIS, UNSPECIFIED SEASONALITY, UNSPECIFIED TRIGGER: ICD-10-CM

## 2021-09-22 DIAGNOSIS — J33.9 NASAL POLYP: ICD-10-CM

## 2021-09-22 PROCEDURE — 99203 OFFICE O/P NEW LOW 30 MIN: CPT | Mod: 95,,, | Performed by: SPECIALIST

## 2021-09-22 PROCEDURE — 99203 PR OFFICE/OUTPT VISIT, NEW, LEVL III, 30-44 MIN: ICD-10-PCS | Mod: 95,,, | Performed by: SPECIALIST

## 2021-09-22 RX ORDER — AZELASTINE 1 MG/ML
SPRAY, METERED NASAL
Qty: 30 ML | Refills: 11 | Status: SHIPPED | OUTPATIENT
Start: 2021-09-22 | End: 2022-10-06

## 2021-09-22 RX ORDER — FLUTICASONE PROPIONATE 50 MCG
SPRAY, SUSPENSION (ML) NASAL
Qty: 18.2 ML | Refills: 11 | Status: SHIPPED | OUTPATIENT
Start: 2021-09-22 | End: 2022-10-06

## 2021-10-07 ENCOUNTER — PATIENT MESSAGE (OUTPATIENT)
Dept: SLEEP MEDICINE | Facility: CLINIC | Age: 51
End: 2021-10-07

## 2021-10-07 ENCOUNTER — OFFICE VISIT (OUTPATIENT)
Dept: SLEEP MEDICINE | Facility: CLINIC | Age: 51
End: 2021-10-07
Payer: COMMERCIAL

## 2021-10-07 DIAGNOSIS — G47.33 OSA (OBSTRUCTIVE SLEEP APNEA): Primary | ICD-10-CM

## 2021-10-07 PROCEDURE — 99214 PR OFFICE/OUTPT VISIT, EST, LEVL IV, 30-39 MIN: ICD-10-PCS | Mod: 95,,, | Performed by: PSYCHIATRY & NEUROLOGY

## 2021-10-07 PROCEDURE — 99214 OFFICE O/P EST MOD 30 MIN: CPT | Mod: 95,,, | Performed by: PSYCHIATRY & NEUROLOGY

## 2021-10-13 DIAGNOSIS — E11.9 TYPE 2 DIABETES MELLITUS WITHOUT COMPLICATION: ICD-10-CM

## 2021-10-19 ENCOUNTER — PATIENT MESSAGE (OUTPATIENT)
Dept: OTOLARYNGOLOGY | Facility: CLINIC | Age: 51
End: 2021-10-19
Payer: COMMERCIAL

## 2021-11-22 ENCOUNTER — HOSPITAL ENCOUNTER (OUTPATIENT)
Dept: RADIOLOGY | Facility: HOSPITAL | Age: 51
Discharge: HOME | End: 2021-11-22
Attending: SPECIALIST
Payer: COMMERCIAL

## 2021-11-22 ENCOUNTER — PATIENT MESSAGE (OUTPATIENT)
Dept: OTOLARYNGOLOGY | Facility: CLINIC | Age: 51
End: 2021-11-22
Payer: COMMERCIAL

## 2021-11-22 DIAGNOSIS — J33.8 NASAL SINUS POLYP: ICD-10-CM

## 2021-11-22 DIAGNOSIS — R51.9 NONINTRACTABLE EPISODIC HEADACHE, UNSPECIFIED HEADACHE TYPE: ICD-10-CM

## 2021-11-22 DIAGNOSIS — J33.9 NASAL POLYP: ICD-10-CM

## 2021-11-22 PROCEDURE — 70486 CT MAXILLOFACIAL W/O DYE: CPT | Mod: 26,,, | Performed by: RADIOLOGY

## 2021-11-22 PROCEDURE — 70486 CT MAXILLOFACIAL W/O DYE: CPT | Mod: TC

## 2021-11-22 PROCEDURE — 70486 CT MEDTRONIC SINUSES WITHOUT: ICD-10-PCS | Mod: 26,,, | Performed by: RADIOLOGY

## 2021-11-23 ENCOUNTER — OFFICE VISIT (OUTPATIENT)
Dept: OTOLARYNGOLOGY | Facility: CLINIC | Age: 51
End: 2021-11-23
Payer: COMMERCIAL

## 2021-11-23 VITALS
TEMPERATURE: 98 F | WEIGHT: 315 LBS | BODY MASS INDEX: 40.66 KG/M2 | HEART RATE: 63 BPM | SYSTOLIC BLOOD PRESSURE: 150 MMHG | DIASTOLIC BLOOD PRESSURE: 79 MMHG

## 2021-11-23 DIAGNOSIS — J32.0 CHRONIC MAXILLARY SINUSITIS: ICD-10-CM

## 2021-11-23 DIAGNOSIS — J32.1 CHRONIC FRONTAL SINUSITIS: ICD-10-CM

## 2021-11-23 DIAGNOSIS — J30.9 ALLERGIC RHINITIS, UNSPECIFIED SEASONALITY, UNSPECIFIED TRIGGER: ICD-10-CM

## 2021-11-23 DIAGNOSIS — R51.9 NONINTRACTABLE EPISODIC HEADACHE, UNSPECIFIED HEADACHE TYPE: ICD-10-CM

## 2021-11-23 DIAGNOSIS — J34.2 NASAL SEPTAL DEVIATION: ICD-10-CM

## 2021-11-23 DIAGNOSIS — J34.3 HYPERTROPHY OF BOTH INFERIOR NASAL TURBINATES: ICD-10-CM

## 2021-11-23 DIAGNOSIS — J33.8 NASAL SINUS POLYP: Primary | ICD-10-CM

## 2021-11-23 DIAGNOSIS — G47.33 OSA (OBSTRUCTIVE SLEEP APNEA): ICD-10-CM

## 2021-11-23 DIAGNOSIS — J32.2 CHRONIC ETHMOIDAL SINUSITIS: ICD-10-CM

## 2021-11-23 DIAGNOSIS — J34.89 INTRANASAL SYNECHIAE: ICD-10-CM

## 2021-11-23 PROBLEM — J33.9 NASAL POLYP: Status: ACTIVE | Noted: 2021-11-23

## 2021-11-23 PROCEDURE — 31231 NASAL ENDOSCOPY DX: CPT | Mod: S$GLB,,, | Performed by: SPECIALIST

## 2021-11-23 PROCEDURE — 99214 PR OFFICE/OUTPT VISIT, EST, LEVL IV, 30-39 MIN: ICD-10-PCS | Mod: 25,S$GLB,, | Performed by: SPECIALIST

## 2021-11-23 PROCEDURE — 99214 OFFICE O/P EST MOD 30 MIN: CPT | Mod: 25,S$GLB,, | Performed by: SPECIALIST

## 2021-11-23 PROCEDURE — 99999 PR PBB SHADOW E&M-EST. PATIENT-LVL IV: ICD-10-PCS | Mod: PBBFAC,,, | Performed by: SPECIALIST

## 2021-11-23 PROCEDURE — 31231 PR NASAL ENDOSCOPY, DX: ICD-10-PCS | Mod: S$GLB,,, | Performed by: SPECIALIST

## 2021-11-23 PROCEDURE — 99999 PR PBB SHADOW E&M-EST. PATIENT-LVL IV: CPT | Mod: PBBFAC,,, | Performed by: SPECIALIST

## 2021-11-24 ENCOUNTER — TELEPHONE (OUTPATIENT)
Dept: FAMILY MEDICINE | Facility: CLINIC | Age: 51
End: 2021-11-24
Payer: COMMERCIAL

## 2021-11-24 ENCOUNTER — PATIENT MESSAGE (OUTPATIENT)
Dept: FAMILY MEDICINE | Facility: CLINIC | Age: 51
End: 2021-11-24
Payer: COMMERCIAL

## 2022-01-03 ENCOUNTER — PATIENT MESSAGE (OUTPATIENT)
Dept: ENDOCRINOLOGY | Facility: CLINIC | Age: 52
End: 2022-01-03
Payer: COMMERCIAL

## 2022-01-03 DIAGNOSIS — E11.9 TYPE 2 DIABETES MELLITUS WITHOUT COMPLICATION, WITHOUT LONG-TERM CURRENT USE OF INSULIN: ICD-10-CM

## 2022-01-04 RX ORDER — INSULIN GLARGINE 100 [IU]/ML
INJECTION, SOLUTION SUBCUTANEOUS
Qty: 15 ML | Refills: 1 | OUTPATIENT
Start: 2022-01-04

## 2022-01-08 NOTE — TELEPHONE ENCOUNTER
No new care gaps identified.  Powered by [employer]. Reference number: [id number].   1/08/2022 1:27:56 PM CST

## 2022-01-10 ENCOUNTER — PATIENT MESSAGE (OUTPATIENT)
Dept: ADMINISTRATIVE | Facility: HOSPITAL | Age: 52
End: 2022-01-10
Payer: COMMERCIAL

## 2022-01-10 RX ORDER — VENLAFAXINE 25 MG/1
TABLET ORAL
Qty: 90 TABLET | Refills: 1 | OUTPATIENT
Start: 2022-01-10

## 2022-01-10 RX ORDER — MONTELUKAST SODIUM 10 MG/1
TABLET ORAL
Qty: 90 TABLET | Refills: 0 | OUTPATIENT
Start: 2022-01-10

## 2022-01-10 NOTE — TELEPHONE ENCOUNTER
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   Medication reconciliation completed: No   Automatic Epic Generated Protocol Data:        Requested Prescriptions   Pending Prescriptions Disp Refills    venlafaxine (EFFEXOR) 25 MG Tab [Pharmacy Med Name: venlafaxine 25 mg tablet] 90 tablet 1     Sig: TAKE 1 TABLET BY MOUTH DAILY       Psychiatry: Antidepressants - SNRI - desvenlafaxine & venlafaxine Failed - 1/8/2022  1:27 PM        Failed - Last BP in normal range within 360 days     BP Readings from Last 1 Encounters:   11/23/21 (!) 150/79               Passed - Patient is at least 18 years old        Passed - Valid encounter within last 15 months     Recent Visits  Date Type Provider Dept   12/29/20 Office Visit Elpidio Robertson MD [facility]   11/30/20 Office Visit Elpidio Robertson MD [facility]   11/16/20 Office Visit Elpidio Robertson MD [facility]   Showing recent visits within past 720 days and meeting all other requirements  Future Appointments  No visits were found meeting these conditions.  Showing future appointments within next 150 days and meeting all other requirements                Passed - Cr is 1.39 or below and within 360 days     Lab Results   Component Value Date    CREATININE 1.2 01/26/2021    CREATININE 1.1 12/07/2020    CREATININE 1.5 (H) 11/24/2020              Passed - eGFR within 360 days     Lab Results   Component Value Date    EGFRNONAA >60 01/26/2021    EGFRNONAA >60 12/07/2020    EGFRNONAA 53 (A) 11/24/2020                  montelukast (SINGULAIR) 10 mg tablet [Pharmacy Med Name: montelukast 10 mg  tablet] 90 tablet 0     Sig: TAKE 1 TABLET BY MOUTH DAILY       Pulmonology:  Leukotriene Inhibitors Passed - 1/9/2022  9:08 PM        Passed - Patient is at least 18 years old        Passed - Valid encounter within last 15 months     Recent Visits  Date Type Provider Dept   12/29/20 Office Visit Elpidio Robertson MD [facility]   11/30/20 Office Visit Elpidio Robertson MD [facility]   11/16/20 Office Visit Elpidio Robertson MD [facility]   Showing recent visits within past 720 days and meeting all other requirements  Future Appointments  No visits were found meeting these conditions.  Showing future appointments within next 150 days and meeting all other requirements                      Appointments  past 12m or future 3m with PCP    Date Provider   Last Visit   12/29/2020 Elpidio Robertson MD   Next Visit   Visit date not found Elpidio Robertson MD   ED visits in past 90 days: 0        Note composed:9:11 PM 01/09/2022

## 2022-01-10 NOTE — TELEPHONE ENCOUNTER
Provider Staff:     Action required for this patient.    Please note Refusal of medication.            Requested Prescriptions     Refused Prescriptions Disp Refills    venlafaxine (EFFEXOR) 25 MG Tab [Pharmacy Med Name: venlafaxine 25 mg tablet] 90 tablet 1     Sig: TAKE 1 TABLET BY MOUTH DAILY     Refused By: DELMER PELLETIER     Reason for Refusal: Patient needs an appointment    montelukast (SINGULAIR) 10 mg tablet [Pharmacy Med Name: montelukast 10 mg tablet] 90 tablet 0     Sig: TAKE 1 TABLET BY MOUTH DAILY     Refused By: DELMER PELLETIER     Reason for Refusal: Patient needs an appointment      Thanks!  [facility]   Note composed: 01/10/2022 8:33 AM

## 2022-01-12 ENCOUNTER — TELEPHONE (OUTPATIENT)
Dept: ENDOCRINOLOGY | Facility: CLINIC | Age: 52
End: 2022-01-12
Payer: COMMERCIAL

## 2022-01-12 ENCOUNTER — PATIENT MESSAGE (OUTPATIENT)
Dept: ENDOCRINOLOGY | Facility: CLINIC | Age: 52
End: 2022-01-12
Payer: COMMERCIAL

## 2022-01-12 DIAGNOSIS — E11.9 TYPE 2 DIABETES MELLITUS WITHOUT COMPLICATION, WITHOUT LONG-TERM CURRENT USE OF INSULIN: Primary | ICD-10-CM

## 2022-03-21 ENCOUNTER — PATIENT MESSAGE (OUTPATIENT)
Dept: INTERNAL MEDICINE | Facility: CLINIC | Age: 52
End: 2022-03-21
Payer: COMMERCIAL

## 2022-03-21 RX ORDER — MONTELUKAST SODIUM 10 MG/1
10 TABLET ORAL DAILY
Qty: 90 TABLET | Refills: 3 | Status: CANCELLED | OUTPATIENT
Start: 2022-03-21

## 2022-06-08 ENCOUNTER — PATIENT MESSAGE (OUTPATIENT)
Dept: INTERNAL MEDICINE | Facility: CLINIC | Age: 52
End: 2022-06-08
Payer: COMMERCIAL

## 2022-06-21 RX ORDER — MONTELUKAST SODIUM 10 MG/1
TABLET ORAL
Qty: 90 TABLET | Refills: 3 | Status: SHIPPED | OUTPATIENT
Start: 2022-06-21 | End: 2023-05-30 | Stop reason: SDUPTHER

## 2022-06-21 NOTE — TELEPHONE ENCOUNTER
Care Due:                  Date            Visit Type   Department     Provider  --------------------------------------------------------------------------------                                MYCHART                              FOLLOWUP/OF  DESC FAMILY  Last Visit: 12-      [facility]  Elpidio Peck                              [facility]                              ANNUAL                              CHECKUP/Y  [facility]  Next Visit: 06-      [facility]           Elpidio Peck                                                            Last  Test          Frequency    Reason                     Performed    Due Date  --------------------------------------------------------------------------------    CBC.........  12 months..  fenofibrate..............  11- 11-    CMP.........  12 months..  fenofibrate,               12- 12-                             losartan-hydrochlorothiaz                             aditi, metFORMIN,                             venlafaxine..............    HBA1C.......  6 months...  metFORMIN................  07- 12-    Lipid Panel.  12 months..  fenofibrate..............  01- 01-    [facility] Embedded Care Gaps. Reference number: [id number]. 6/21/2022   1:39:25 PM CDT

## 2022-07-07 ENCOUNTER — PATIENT MESSAGE (OUTPATIENT)
Dept: ADMINISTRATIVE | Facility: HOSPITAL | Age: 52
End: 2022-07-07
Payer: COMMERCIAL

## 2022-07-07 ENCOUNTER — PATIENT OUTREACH (OUTPATIENT)
Dept: ADMINISTRATIVE | Facility: HOSPITAL | Age: 52
End: 2022-07-07
Payer: COMMERCIAL

## 2022-07-07 DIAGNOSIS — E11.9 TYPE 2 DIABETES MELLITUS WITHOUT COMPLICATION, UNSPECIFIED WHETHER LONG TERM INSULIN USE: Primary | ICD-10-CM

## 2022-07-07 DIAGNOSIS — E78.2 MIXED HYPERLIPIDEMIA: ICD-10-CM

## 2022-07-07 NOTE — PROGRESS NOTES
Health Maintenance Due   Topic Date Due    TETANUS VACCINE  Never done    Colorectal Cancer Screening  Never done    Shingles Vaccine (1 of 2) Never done    COVID-19 Vaccine (3 - Booster for Moderna series) 10/20/2021    Foot Exam  12/04/2021    Eye Exam  12/18/2021    Pneumococcal Vaccines (Age 0-64) (2 - PCV) 12/29/2021    Hemoglobin A1c  01/01/2022    Lipid Panel  01/26/2022    Diabetes Urine Screening  07/01/2022     Chart review done.  updated. Immunizations reviewed & updated. Care Everywhere updated.  Portal message sent to schedule labs.

## 2022-07-21 RX ORDER — VENLAFAXINE 25 MG/1
TABLET ORAL
Qty: 90 TABLET | Refills: 3 | Status: SHIPPED | OUTPATIENT
Start: 2022-07-21 | End: 2023-05-30 | Stop reason: SDUPTHER

## 2022-07-21 RX ORDER — SEMAGLUTIDE 1.34 MG/ML
1 INJECTION, SOLUTION SUBCUTANEOUS
Qty: 3 PEN | Refills: 1 | Status: SHIPPED | OUTPATIENT
Start: 2022-07-21 | End: 2022-09-22

## 2022-07-21 NOTE — TELEPHONE ENCOUNTER
No new care gaps identified.  [facility] Embedded Care Gaps. Reference number: [id number]. 7/21/2022   9:51:37 AM JULIANT

## 2022-07-25 RX ORDER — SEMAGLUTIDE 1.34 MG/ML
INJECTION, SOLUTION SUBCUTANEOUS
Refills: 0 | OUTPATIENT
Start: 2022-07-25

## 2022-08-31 DIAGNOSIS — E11.9 TYPE 2 DIABETES MELLITUS WITHOUT COMPLICATION, WITHOUT LONG-TERM CURRENT USE OF INSULIN: ICD-10-CM

## 2022-09-14 DIAGNOSIS — E11.9 TYPE 2 DIABETES MELLITUS WITHOUT COMPLICATION: ICD-10-CM

## 2022-09-20 ENCOUNTER — PATIENT MESSAGE (OUTPATIENT)
Dept: ADMINISTRATIVE | Facility: HOSPITAL | Age: 52
End: 2022-09-20
Payer: COMMERCIAL

## 2022-09-22 ENCOUNTER — OFFICE VISIT (OUTPATIENT)
Dept: ENDOCRINOLOGY | Facility: CLINIC | Age: 52
End: 2022-09-22
Payer: COMMERCIAL

## 2022-09-22 DIAGNOSIS — E11.9 TYPE 2 DIABETES MELLITUS WITHOUT COMPLICATION, WITHOUT LONG-TERM CURRENT USE OF INSULIN: Primary | ICD-10-CM

## 2022-09-22 DIAGNOSIS — E78.1 HYPERTRIGLYCERIDEMIA: ICD-10-CM

## 2022-09-22 DIAGNOSIS — G47.33 OSA (OBSTRUCTIVE SLEEP APNEA): ICD-10-CM

## 2022-09-22 PROCEDURE — 99214 PR OFFICE/OUTPT VISIT, EST, LEVL IV, 30-39 MIN: ICD-10-PCS | Mod: 95,,, | Performed by: INTERNAL MEDICINE

## 2022-09-22 PROCEDURE — 99214 OFFICE O/P EST MOD 30 MIN: CPT | Mod: 95,,, | Performed by: INTERNAL MEDICINE

## 2022-09-22 RX ORDER — TIRZEPATIDE 5 MG/.5ML
5 INJECTION, SOLUTION SUBCUTANEOUS
Qty: 4 PEN | Refills: 0 | Status: SHIPPED | OUTPATIENT
Start: 2022-09-22 | End: 2022-09-26 | Stop reason: SDUPTHER

## 2022-09-22 RX ORDER — METFORMIN HYDROCHLORIDE 500 MG/1
1000 TABLET, EXTENDED RELEASE ORAL 2 TIMES DAILY WITH MEALS
Qty: 360 TABLET | Refills: 3 | Status: SHIPPED | OUTPATIENT
Start: 2022-09-22 | End: 2024-01-30

## 2022-09-22 NOTE — PROGRESS NOTES
ENDOCRINOLOGY CLINIC FOLLOW UP  09/22/2022     The patient location is: home    Visit type: audiovisual    Face to Face time with patient: 20  30 minutes of total time spent on the encounter, which includes face to face time and non-face to face time preparing to see the patient (eg, review of tests), Obtaining and/or reviewing separately obtained history, Documenting clinical information in the electronic or other health record, Independently interpreting results (not separately reported) and communicating results to the patient/family/caregiver, or Care coordination (not separately reported).     Each patient to whom he or she provides medical services by telemedicine is:  (1) informed of the relationship between the physician and patient and the respective role of any other health care provider with respect to management of the patient; and (2) notified that he or she may decline to receive medical services by telemedicine and may withdraw from such care at any time.    The patient's last visit with me was on 6/3/2021.       Subjective:      CC:  type 2 diabetes    HPI:   Nael Bravo is a 51 y.o. male with hypertriglyceridemia, hypertension, obstructive sleep apnea, GERD, depression, and recent diagnosis of type 2 diabetes here for f/u    Interval Hx:  At his last visit we increased Ozempic and stopped fiasp.  He has been doing well with good glycemic control, still using freestyle Debi 2.      He has notice some abdominal bloating which resolved when he took a break from metformin however he resumed metformin due to slight rise in blood sugars.      Denies polyuria/polydipsia.     He denies any known history of pancreatitis or medullary thyroid cancer.    Home scale 305.  Over the past 6 months weight has been stable with no significant loss.  Wt Readings from Last 3 Encounters:   11/23/21 (!) 143.6 kg (316 lb 11.1 oz)   12/29/20 (!) 140.8 kg (310 lb 4.8 oz)   12/17/20 (!) 140.2 kg (309 lb)        Diabetes  "Hx:  Diagnosed w/ DM: T2DM diagnosed at the end of 11/2020  Complications: none known  Current meds: compliant with    ozempic 1 mg weekly - no SE   basaglar 35 units nightly   Metformin 1000 mg b.i.d. - with bloating  Previous meds:   Tradjenta 5 mg daily (prescribed but not started)  Hypoglycemia: denies  Home glucose checks: checks BG 4-6 times a day w/ freestyle mary 2  See media for CGMreport -    Diet/Exercise:    3 meals per day, will try to stick to 30g cho/meal and no snacks, does not drink any sugar sweetened beverages.    Exercising regularly  Last A1c:   Lab Results   Component Value Date    HGBA1C 5.8 (H) 07/01/2021    HGBA1C 13.6 (H) 11/24/2020     microalbumin: on losartan 100 mg daily  Lab Results   Component Value Date    LABMICR 9.0 07/01/2021    CREATRANDUR 150.0 07/01/2021    MICALBCREAT 6.0 07/01/2021     Lipids: pravastatin 40 mg, on fenofibrate 160 mg daily , lovaza 2g BID-   Lab Results   Component Value Date    CHOL 188 01/26/2021    TRIG 226 (H) 01/26/2021    HDL 28 (L) 01/26/2021    LDLCALC 114.8 01/26/2021    CHOLHDL 14.9 (L) 01/26/2021     TSH:  Lab Results   Component Value Date    TSH 2.342 11/24/2020     Eye: no DR, no vision change- has exam on Monday    Last eye exam: : 12/18/2020)  Foot: no numbness/tingling, no wounds   Last foot exam: : 12/04/2020)  FHx of DM: denies      Review of patient's allergies indicates:  No Known Allergies      Current Outpatient Medications:     azelastine (ASTELIN) 137 mcg (0.1 %) nasal spray, Two sprays in each nostril, sniff until absorbed, then follow with 1 spray of fluticasone.  Use both sprays twice daily., Disp: 30 mL, Rfl: 11    BASAGLAR KWIKPEN U-100 INSULIN glargine 100 units/mL (3mL) SubQ pen, Inject 35 units under the skin nightly. Increase per MD instruction up to max daily dose of 60 units, Disp: 15 mL, Rfl: 3    BD ULTRA-FINE OLIVER PEN NEEDLE 32 gauge x 5/32" Ndle, Used to inject insulin 4 times a day, Disp: 200 each, Rfl: 3    blood " sugar diagnostic Strp, Test once daily.  Dx E11.9.  Any formulary preferred brand., Disp: 100 each, Rfl: 11    blood-glucose meter kit, Test once daily.  Dx E11.9.  Any formulary preferred brand., Disp: 1 each, Rfl: 0    fenofibrate 160 MG Tab, Take 1 tablet (160 mg total) by mouth once daily., Disp: 90 tablet, Rfl: 3    fluticasone propionate (FLONASE) 50 mcg/actuation nasal spray, One spray in each nostril twice daily after 1st using azelastine nasal spray, Disp: 18.2 mL, Rfl: 11    FREESTYLE MAGDALENA 2 SENSOR Kit, AS directed, Disp: 6 kit, Rfl: 2    losartan-hydrochlorothiazide 50-12.5 mg (HYZAAR) 50-12.5 mg per tablet, Take 2 tablets by mouth once daily., Disp: 180 tablet, Rfl: 3    metFORMIN (GLUCOPHAGE) 1000 MG tablet, Take 1 tablet (1,000 mg total) by mouth 2 (two) times daily with meals., Disp: 180 tablet, Rfl: 3    montelukast (SINGULAIR) 10 mg tablet, TAKE 1 TABLET BY MOUTH DAILY, Disp: 90 tablet, Rfl: 3    omega-3 acid ethyl esters (LOVAZA) 1 gram capsule, Take 2 capsules (2 g total) by mouth 2 (two) times daily., Disp: 120 capsule, Rfl: 3    pantoprazole (PROTONIX) 40 MG tablet, TAKE 1 TABLET BY MOUTH DAILY, Disp: 90 tablet, Rfl: 3    pravastatin (PRAVACHOL) 40 MG tablet, Take 1 tablet (40 mg total) by mouth once daily., Disp: 90 tablet, Rfl: 3    semaglutide (OZEMPIC) 1 mg/dose (4 mg/3 mL), Inject 1 mg into the skin every 7 days., Disp: 3 pen, Rfl: 1    SURE COMFORT LANCETS 28 gauge Misc, Test once daily. Dx E11.9. Any formulary preferred brand., Disp: , Rfl:     venlafaxine (EFFEXOR) 25 MG Tab, TAKE 1 TABLET BY MOUTH DAILY, Disp: 90 tablet, Rfl: 3        Objective:   Physical Exam   There were no vitals taken for this visit.  Wt Readings from Last 3 Encounters:   11/23/21 (!) 143.6 kg (316 lb 11.1 oz)   12/29/20 (!) 140.8 kg (310 lb 4.8 oz)   12/17/20 (!) 140.2 kg (309 lb)   ]    Constitutional:  Pleasant,  in no acute distress.   HENT:   Eyes:     No scleral icterus.   Respiratory:   Effort normal    Neurological:  normal speech  Psych:  Normal mood and affect.      LABORATORY REVIEW:    Chemistry        Component Value Date/Time     01/26/2021 0844    K 3.7 01/26/2021 0844     01/26/2021 0844    CO2 26 01/26/2021 0844    BUN 21 (H) 01/26/2021 0844    CREATININE 1.2 01/26/2021 0844     (H) 01/26/2021 0844        Component Value Date/Time    CALCIUM 9.3 01/26/2021 0844    ALKPHOS 78 12/07/2020 0810    AST 41 (H) 12/07/2020 0810    ALT 62 (H) 12/07/2020 0810    BILITOT 0.9 12/07/2020 0810    ESTGFRAFRICA >60 01/26/2021 0844    EGFRNONAA >60 01/26/2021 0844          Lab Results   Component Value Date    HGBA1C 5.8 (H) 07/01/2021    HGBA1C 13.6 (H) 11/24/2020     Other labs reviewed today in HPI    Assessment/Plan:     Problem List Items Addressed This Visit          1 - High    Type 2 diabetes mellitus without complication, without long-term current use of insulin - Primary     Blood sugars well controlled in the daytime but limited data for overnight as he has been forgetting to scan freestyle Debi in the morning when he wakes up.  Weight has been stable despite compliance with metformin 1 mg weekly and healthy diet with physical activity.  Will reduce basal insulin and try to switch Ozempic to mounjaro if covered by insurance.  If not covered will increase Ozempic to 2 mg weekly.      As he was having bloating with regular metformin will switch to extended-release after taking a break for a few days with gradual increase to goal of a 1000 mg twice a day.      Will update labs now         Relevant Medications    metFORMIN (GLUCOPHAGE-XR) 500 MG ER 24hr tablet    tirzepatide (MOUNJARO) 5 mg/0.5 mL PnIj    Other Relevant Orders    TSH    Hemoglobin A1C    Microalbumin/Creatinine Ratio, Urine       2     Hypertriglyceridemia     Due for lipid panel, continue current medications.            3     DURGA (obstructive sleep apnea)     Is compliant with CPAP, using it more than 4 hours a night.  Will  continue and get sinus problems addressed as it seems like this may be impairing his sleep and preventing weight loss.            Patient Instructions         Regimen as of 09/22/2022    basaglar 20 units nightly   Stop metformin for 3 days then resume the extended release   Changed from ozempic to mounjaro (let me know if not covered so I can increase the ozempic)      Start metformin extended release using the following schedule    Week 1 Take 1 pill with dinner (500 mg)  Week 2 Take 1 pill with breakfast or morning snack and one pill with dinner (500  Mg twice a day)  Week 3 Take 2 pills with dinner, 1 with breakfast  Week 4 Take 2 pills with dinner, 2 with breakfast       Mounjaro Instructions:  Start Mounjaro 5 mg weekly for 4 weeks & then increase to 10 mg weekly. We will re-evaluate your blood sugars at your next visit and determine the need to increase the dose further.   One pen can stay out of the refrigerator for 21 days.   Please take pen out of the refrigerator 10 minutes prior to injection.    Potential Side Effects of Mounjaro:    One of the ways it works is by decreasing how fast your stomach empties, which makes you feel full faster after you eat and should help you lose weight. The main side-effects are related to GI upset, such as nausea, bloating and abdominal cramps. You can usually avoid this by eating slowly (take half of your normal portion and eat it over 30 minutes). If you do experience nausea, it does tend to get better after the first few weeks. The most severe reaction is acute pancreatitis which can cause severe abdominal pain radiating to your back. If you experience this, stop the medication and go to the ER. Fortunately this is very rare.               RTC with virtual in 4 month(s)   Labs and urine tomorrow am at [facility].      Vidya Yuan MD

## 2022-09-22 NOTE — ASSESSMENT & PLAN NOTE
Is compliant with CPAP, using it more than 4 hours a night.  Will continue and get sinus problems addressed as it seems like this may be impairing his sleep and preventing weight loss.

## 2022-09-22 NOTE — ASSESSMENT & PLAN NOTE
Blood sugars well controlled in the daytime but limited data for overnight as he has been forgetting to scan freestyle Debi in the morning when he wakes up.  Weight has been stable despite compliance with metformin 1 mg weekly and healthy diet with physical activity.  Will reduce basal insulin and try to switch Ozempic to mounjaro if covered by insurance.  If not covered will increase Ozempic to 2 mg weekly.      As he was having bloating with regular metformin will switch to extended-release after taking a break for a few days with gradual increase to goal of a 1000 mg twice a day.      Will update labs now

## 2022-09-22 NOTE — PATIENT INSTRUCTIONS
Regimen as of 09/22/2022    basaglar 20 units nightly   Stop metformin for 3 days then resume the extended release   Changed from ozempic to mounjaro (let me know if not covered so I can increase the ozempic)      Start metformin extended release using the following schedule    Week 1 Take 1 pill with dinner (500 mg)  Week 2 Take 1 pill with breakfast or morning snack and one pill with dinner (500  Mg twice a day)  Week 3 Take 2 pills with dinner, 1 with breakfast  Week 4 Take 2 pills with dinner, 2 with breakfast       Mounjaro Instructions:  Start Mounjaro 5 mg weekly for 4 weeks & then increase to 10 mg weekly. We will re-evaluate your blood sugars at your next visit and determine the need to increase the dose further.   One pen can stay out of the refrigerator for 21 days.   Please take pen out of the refrigerator 10 minutes prior to injection.    Potential Side Effects of Mounjaro:    One of the ways it works is by decreasing how fast your stomach empties, which makes you feel full faster after you eat and should help you lose weight. The main side-effects are related to GI upset, such as nausea, bloating and abdominal cramps. You can usually avoid this by eating slowly (take half of your normal portion and eat it over 30 minutes). If you do experience nausea, it does tend to get better after the first few weeks. The most severe reaction is acute pancreatitis which can cause severe abdominal pain radiating to your back. If you experience this, stop the medication and go to the ER. Fortunately this is very rare.

## 2022-09-26 ENCOUNTER — PATIENT MESSAGE (OUTPATIENT)
Dept: ENDOCRINOLOGY | Facility: CLINIC | Age: 52
End: 2022-09-26
Payer: COMMERCIAL

## 2022-09-26 DIAGNOSIS — E11.9 TYPE 2 DIABETES MELLITUS WITHOUT COMPLICATION, WITHOUT LONG-TERM CURRENT USE OF INSULIN: ICD-10-CM

## 2022-09-26 RX ORDER — TIRZEPATIDE 5 MG/.5ML
5 INJECTION, SOLUTION SUBCUTANEOUS
Qty: 4 PEN | Refills: 0 | Status: SHIPPED | OUTPATIENT
Start: 2022-09-26 | End: 2022-10-12

## 2022-09-28 ENCOUNTER — LAB VISIT (OUTPATIENT)
Dept: LAB | Facility: HOSPITAL | Age: 52
End: 2022-09-28
Attending: INTERNAL MEDICINE
Payer: COMMERCIAL

## 2022-09-28 DIAGNOSIS — E78.2 MIXED HYPERLIPIDEMIA: ICD-10-CM

## 2022-09-28 DIAGNOSIS — E11.9 TYPE 2 DIABETES MELLITUS WITHOUT COMPLICATION, WITHOUT LONG-TERM CURRENT USE OF INSULIN: ICD-10-CM

## 2022-09-28 LAB
ALBUMIN SERPL BCP-MCNC: 4 G/DL (ref 3.5–5.2)
ALP SERPL-CCNC: 55 U/L (ref 55–135)
ALT SERPL W/O P-5'-P-CCNC: 58 U/L (ref 10–44)
ANION GAP SERPL CALC-SCNC: 10 MMOL/L (ref 8–16)
AST SERPL-CCNC: 36 U/L (ref 10–40)
BILIRUB SERPL-MCNC: 0.7 MG/DL (ref 0.1–1)
BUN SERPL-MCNC: 16 MG/DL (ref 6–20)
CALCIUM SERPL-MCNC: 9.6 MG/DL (ref 8.7–10.5)
CHLORIDE SERPL-SCNC: 104 MMOL/L (ref 95–110)
CHOLEST SERPL-MCNC: 160 MG/DL (ref 120–199)
CHOLEST/HDLC SERPL: 5.9 {RATIO} (ref 2–5)
CO2 SERPL-SCNC: 26 MMOL/L (ref 23–29)
CREAT SERPL-MCNC: 1.3 MG/DL (ref 0.5–1.4)
EST. GFR  (NO RACE VARIABLE): >60 ML/MIN/1.73 M^2
ESTIMATED AVG GLUCOSE: 143 MG/DL (ref 68–131)
GLUCOSE SERPL-MCNC: 132 MG/DL (ref 70–110)
HBA1C MFR BLD: 6.6 % (ref 4–5.6)
HDLC SERPL-MCNC: 27 MG/DL (ref 40–75)
HDLC SERPL: 16.9 % (ref 20–50)
LDLC SERPL CALC-MCNC: 94.8 MG/DL (ref 63–159)
NONHDLC SERPL-MCNC: 133 MG/DL
POTASSIUM SERPL-SCNC: 4.5 MMOL/L (ref 3.5–5.1)
PROT SERPL-MCNC: 6.7 G/DL (ref 6–8.4)
SODIUM SERPL-SCNC: 140 MMOL/L (ref 136–145)
TRIGL SERPL-MCNC: 191 MG/DL (ref 30–150)
TSH SERPL DL<=0.005 MIU/L-ACNC: 2.31 UIU/ML (ref 0.4–4)

## 2022-09-28 PROCEDURE — 84443 ASSAY THYROID STIM HORMONE: CPT | Performed by: INTERNAL MEDICINE

## 2022-09-28 PROCEDURE — 83036 HEMOGLOBIN GLYCOSYLATED A1C: CPT | Performed by: INTERNAL MEDICINE

## 2022-09-28 PROCEDURE — 36415 COLL VENOUS BLD VENIPUNCTURE: CPT | Mod: PO | Performed by: INTERNAL MEDICINE

## 2022-09-28 PROCEDURE — 80053 COMPREHEN METABOLIC PANEL: CPT | Performed by: INTERNAL MEDICINE

## 2022-09-28 PROCEDURE — 80061 LIPID PANEL: CPT | Performed by: INTERNAL MEDICINE

## 2022-10-02 ENCOUNTER — PATIENT MESSAGE (OUTPATIENT)
Dept: ENDOCRINOLOGY | Facility: CLINIC | Age: 52
End: 2022-10-02
Payer: COMMERCIAL

## 2022-10-03 ENCOUNTER — OFFICE VISIT (OUTPATIENT)
Dept: OPTOMETRY | Facility: CLINIC | Age: 52
End: 2022-10-03
Payer: COMMERCIAL

## 2022-10-03 ENCOUNTER — TELEPHONE (OUTPATIENT)
Dept: ENDOCRINOLOGY | Facility: CLINIC | Age: 52
End: 2022-10-03
Payer: COMMERCIAL

## 2022-10-03 DIAGNOSIS — H43.393 VISUAL FLOATERS, BILATERAL: Primary | ICD-10-CM

## 2022-10-03 DIAGNOSIS — H52.4 HYPEROPIA WITH PRESBYOPIA OF BOTH EYES: ICD-10-CM

## 2022-10-03 DIAGNOSIS — E11.9 TYPE 2 DIABETES MELLITUS WITHOUT RETINOPATHY: ICD-10-CM

## 2022-10-03 DIAGNOSIS — E11.9 TYPE 2 DIABETES MELLITUS WITHOUT COMPLICATION, WITHOUT LONG-TERM CURRENT USE OF INSULIN: ICD-10-CM

## 2022-10-03 DIAGNOSIS — H52.03 HYPEROPIA WITH PRESBYOPIA OF BOTH EYES: ICD-10-CM

## 2022-10-03 PROCEDURE — 92014 PR EYE EXAM, EST PATIENT,COMPREHESV: ICD-10-PCS | Mod: S$GLB,,, | Performed by: OPTOMETRIST

## 2022-10-03 PROCEDURE — 99999 PR PBB SHADOW E&M-EST. PATIENT-LVL III: ICD-10-PCS | Mod: PBBFAC,,, | Performed by: OPTOMETRIST

## 2022-10-03 PROCEDURE — 99999 PR PBB SHADOW E&M-EST. PATIENT-LVL III: CPT | Mod: PBBFAC,,, | Performed by: OPTOMETRIST

## 2022-10-03 PROCEDURE — 92014 COMPRE OPH EXAM EST PT 1/>: CPT | Mod: S$GLB,,, | Performed by: OPTOMETRIST

## 2022-10-03 RX ORDER — ALBUTEROL SULFATE 90 UG/1
2 AEROSOL, METERED RESPIRATORY (INHALATION) 4 TIMES DAILY
COMMUNITY
Start: 2022-08-23

## 2022-10-03 NOTE — PROGRESS NOTES
MARISA    AQUILES: 12/20 with Dr. Carpio  Chief complaint (CC): Patient is here for annual.  Patient had refraction   done after his last exam because his BS was high at the time and the   prescription had changed after his BS was under control. Endocrinologist   recommended exam due to diabetes. Patient states his eyes get red a lot   because of allergies. Patient has reading only glasses and feels he sees   fine with them.  Distance seems fine with out glasses.  Glasses? +  Contacts? -  H/o eye surgery, injections or laser: -  H/o eye injury: -  Known eye conditions? See above  Family h/o eye conditions? -  Eye gtts? Clear eyes redness relief when allergies act up      (-) Flashes (+)  Floaters (-) Mucous   (-)  Tearing (+) Itching (-) Burning   (-) Headaches (-) Eye Pain/discomfort (+) Irritation   (+)  Redness (-) Double vision (-) Blurry vision    Diabetic? + now.  A1c? Hemoglobin A1C       Date                     Value               Ref Range             Status                09/28/2022               6.6 (H)             4.0 - 5.6 %           Final                 07/01/2021               5.8 (H)             4.0 - 5.6 %           Final                 11/24/2020               13.6 (H)            4.0 - 5.6 %           Final                    Last edited by Leanne Cano on 10/3/2022  8:17 AM.            Assessment /Plan     For exam results, see Encounter Report.    Visual floaters, bilateral  No e/o h/b/t 360 degrees OU. Monitor for worsening of symptoms or S/Sx of RD.     Type 2 diabetes mellitus without complication, without long-term current use of insulin  BS control. No signs of diabetic retinopathy. Monitor with annual exam.    Hyperopia with presbyopia of both eyes  SRx released to patient. Patient educated on lens options. Normal ocular health. RTC 1 year for routine exam.     Type 2 Diabetes without retinopathy, bilateral  BS control. No signs of diabetic retinopathy. Monitor with annual  exam.

## 2022-10-06 ENCOUNTER — OFFICE VISIT (OUTPATIENT)
Dept: DERMATOLOGY | Facility: CLINIC | Age: 52
End: 2022-10-06
Payer: COMMERCIAL

## 2022-10-06 DIAGNOSIS — D23.9 DERMATOFIBROMA: ICD-10-CM

## 2022-10-06 DIAGNOSIS — L81.4 LENTIGO: ICD-10-CM

## 2022-10-06 DIAGNOSIS — Z12.83 SKIN EXAM, SCREENING FOR CANCER: Primary | ICD-10-CM

## 2022-10-06 DIAGNOSIS — D22.9 BENIGN MOLE: ICD-10-CM

## 2022-10-06 DIAGNOSIS — D22.9 MULTIPLE BENIGN NEVI: ICD-10-CM

## 2022-10-06 DIAGNOSIS — L21.9 SEBORRHEIC DERMATITIS: ICD-10-CM

## 2022-10-06 DIAGNOSIS — L91.8 SKIN TAG: ICD-10-CM

## 2022-10-06 DIAGNOSIS — L82.1 SK (SEBORRHEIC KERATOSIS): ICD-10-CM

## 2022-10-06 DIAGNOSIS — L72.9 SKIN CYST: ICD-10-CM

## 2022-10-06 PROCEDURE — 99204 PR OFFICE/OUTPT VISIT, NEW, LEVL IV, 45-59 MIN: ICD-10-PCS | Mod: S$GLB,,, | Performed by: DERMATOLOGY

## 2022-10-06 PROCEDURE — 99999 PR PBB SHADOW E&M-EST. PATIENT-LVL IV: CPT | Mod: PBBFAC,,, | Performed by: DERMATOLOGY

## 2022-10-06 PROCEDURE — 99204 OFFICE O/P NEW MOD 45 MIN: CPT | Mod: S$GLB,,, | Performed by: DERMATOLOGY

## 2022-10-06 PROCEDURE — 99999 PR PBB SHADOW E&M-EST. PATIENT-LVL IV: ICD-10-PCS | Mod: PBBFAC,,, | Performed by: DERMATOLOGY

## 2022-10-06 RX ORDER — KETOCONAZOLE 20 MG/G
CREAM TOPICAL 2 TIMES DAILY
Qty: 45 G | Refills: 3 | Status: SHIPPED | OUTPATIENT
Start: 2022-10-06

## 2022-10-06 NOTE — PATIENT INSTRUCTIONS
Patient instructed in importance in daily sun protection. Sun avoidance and topical protection discussed.     Patient encouraged to wear hat for all outdoor exposure.     Also discussed sun protective clothing.

## 2022-10-06 NOTE — PROGRESS NOTES
Subjective:       Patient ID:  Nael Bravo is a 51 y.o. male who presents for   Chief Complaint   Patient presents with    Skin Check     Tbse     HPI    Review of Systems   Constitutional:  Negative for fever and chills.   HENT:  Negative for sore throat.    Respiratory:  Negative for cough.    Skin:  Positive for dry skin.      Objective:    Physical Exam   Constitutional: He appears well-developed and well-nourished. No distress.   Eyes: No conjunctival no injection.   Neurological: He is alert and oriented to person, place, and time. He is not disoriented.   Psychiatric: He has a normal mood and affect.   Skin:   Areas Examined (abnormalities noted in diagram):   Scalp / Hair Palpated and Inspected  Head / Face Inspection Performed  Neck Inspection Performed  Chest / Axilla Inspection Performed  Abdomen Inspection Performed  Genitals / Buttocks / Groin Inspection Performed  Back Inspection Performed  RUE Inspected  LUE Inspection Performed  RLE Inspected  LLE Inspection Performed  Nails and Digits Inspection Performed                 Diagram Legend     Erythematous scaling macule/papule c/w actinic keratosis       Vascular papule c/w angioma      Pigmented verrucoid papule/plaque c/w seborrheic keratosis      Yellow umbilicated papule c/w sebaceous hyperplasia      Irregularly shaped tan macule c/w lentigo     1-2 mm smooth white papules consistent with Milia      Movable subcutaneous cyst with punctum c/w epidermal inclusion cyst      Subcutaneous movable cyst c/w pilar cyst      Firm pink to brown papule c/w dermatofibroma      Pedunculated fleshy papule(s) c/w skin tag(s)      Evenly pigmented macule c/w junctional nevus     Mildly variegated pigmented, slightly irregular-bordered macule c/w mildly atypical nevus      Flesh colored to evenly pigmented papule c/w intradermal nevus       Pink pearly papule/plaque c/w basal cell carcinoma      Erythematous hyperkeratotic cursted plaque c/w SCC      Surgical  scar with no sign of skin cancer recurrence      Open and closed comedones      Inflammatory papules and pustules      Verrucoid papule consistent consistent with wart     Erythematous eczematous patches and plaques     Dystrophic onycholytic nail with subungual debris c/w onychomycosis     Umbilicated papule    Erythematous-base heme-crusted tan verrucoid plaque consistent with inflamed seborrheic keratosis     Erythematous Silvery Scaling Plaque c/w Psoriasis     See annotation      Assessment / Plan:        Skin exam, screening for cancer  No other seriously suspicious lesions noted for body areas examined today.  Patient to inform of us if they notice any dark or changing or suspicious spots in areas not examined today.  Follow up for routine monitoring recommended to patient.    Instructed patient to watch out for dark spots, bleeding spots, crusty spots, sores that break out repeatedly in the same spot.  These characteristics are risk factors for skin cancer, and patient is to notify us if they experience any of these symptoms.  Brochure given for patient education.  Previous [facility] labs and or records and notes reviewed and considered for their impact on our clinical decision making today.    Skin tag  Discussed with patient the benign nature of these lesions and that no treatment is indicated.  Prn cosmetic hyfrecation of tags on the phong pits, l neck base.  Costs $300.  Scar and recurrence reviewed.    Dermatofibroma  Discussed with patient the benign nature of these lesions and that no treatment is indicated.    SK (seborrheic keratosis)  Discussed with patient the benign nature of these lesions and that no treatment is indicated.    Lentigo  Discussed with patient the benign nature of these lesions and that no treatment is indicated.  Patient instructed in importance in daily sun protection. Sun avoidance and topical protection discussed.     Patient encouraged to wear hat for all outdoor exposure.      Also discussed sun protective clothing.    Multiple benign nevi  Discussed all of the following with the patient.    Patient to check their breasts (if applicable), buttocks, and groin with a mirror.  Patient deferred our examination of these areas today.    Each month, check your body for any spots such as freckles, age spots, and moles.  Watch for color changes and shape changes and growth in size.    Have your dee or  pay attention to any dark color changes to moles of the scalp.    Moles, also called nevi, are small, colored (pigmented) marks on the skin. They have no known purpose. Many moles appear before age 30, but they also increase frequently as people age. Moles most often are not cancer (benign) and are harmless. But some become cancerous (malignant). Thats why you need to watch the moles on your body and tell your healthcare provider about any that concern you.  Brochure given for patient education.    Skin cyst  Discussed with patient the likelihood that this lesion is an epidermal cyst caused by an involuted lining of skin with dead skin trapped inside.  Cysts do not have a malignant potential but can become infected and turn into abscesses with possible cellulitis.  Discussed the option of warm compresses if infected with oral antibiotics.  Discussed the option of surgical excision with scar, possible recurrence, hematoma, and infection.  Patient to consider these options.  L ant cheek.    Seborrheic dermatitis  -     ketoconazole (NIZORAL) 2 % cream; Apply topically 2 (two) times daily. Prn flaking of the face  Dispense: 45 g; Refill: 3  Discussed with patient the etiology and pathogenesis of the disease or skin lesion(s) and possible treatments and aggravators.    Reviewed with patient different treatment options and associated risks.  Proper application of medications and or care for affected area(s) and condition(s) reviewed.    Benign mole  We will recheck the back at our  follow up appointment.  Patient and or guardian to monitor this area/lesion or these areas/lesions for changes or worsening or darkening (for moles and freckles).  Patient and or guardian to contact us if any changes are noted for such.           Follow up in about 1 year (around 10/6/2023).

## 2022-10-12 ENCOUNTER — PATIENT MESSAGE (OUTPATIENT)
Dept: ENDOCRINOLOGY | Facility: CLINIC | Age: 52
End: 2022-10-12
Payer: COMMERCIAL

## 2022-10-12 RX ORDER — SEMAGLUTIDE 2.68 MG/ML
2 INJECTION, SOLUTION SUBCUTANEOUS WEEKLY
Qty: 3 ML | Refills: 5 | Status: SHIPPED | OUTPATIENT
Start: 2022-10-12 | End: 2023-03-31 | Stop reason: SDUPTHER

## 2022-11-14 ENCOUNTER — OFFICE VISIT (OUTPATIENT)
Dept: INTERNAL MEDICINE | Facility: CLINIC | Age: 52
End: 2022-11-14
Payer: COMMERCIAL

## 2022-11-14 VITALS
HEIGHT: 74 IN | DIASTOLIC BLOOD PRESSURE: 86 MMHG | OXYGEN SATURATION: 97 % | WEIGHT: 315 LBS | BODY MASS INDEX: 40.43 KG/M2 | HEART RATE: 67 BPM | SYSTOLIC BLOOD PRESSURE: 132 MMHG

## 2022-11-14 DIAGNOSIS — E11.9 TYPE 2 DIABETES MELLITUS WITHOUT COMPLICATION, WITHOUT LONG-TERM CURRENT USE OF INSULIN: ICD-10-CM

## 2022-11-14 DIAGNOSIS — G47.33 OSA (OBSTRUCTIVE SLEEP APNEA): ICD-10-CM

## 2022-11-14 DIAGNOSIS — Z00.00 ANNUAL PHYSICAL EXAM: Primary | ICD-10-CM

## 2022-11-14 DIAGNOSIS — E29.1 HYPOGONADISM IN MALE: ICD-10-CM

## 2022-11-14 DIAGNOSIS — K21.9 GERD WITHOUT ESOPHAGITIS: ICD-10-CM

## 2022-11-14 DIAGNOSIS — F33.0 MILD RECURRENT MAJOR DEPRESSION: ICD-10-CM

## 2022-11-14 DIAGNOSIS — Z23 NEED FOR VACCINATION: ICD-10-CM

## 2022-11-14 DIAGNOSIS — I10 ESSENTIAL HYPERTENSION: ICD-10-CM

## 2022-11-14 DIAGNOSIS — E78.2 MIXED HYPERLIPIDEMIA: ICD-10-CM

## 2022-11-14 DIAGNOSIS — E78.1 HYPERTRIGLYCERIDEMIA: ICD-10-CM

## 2022-11-14 DIAGNOSIS — R53.83 FATIGUE, UNSPECIFIED TYPE: ICD-10-CM

## 2022-11-14 PROCEDURE — 90472 IMMUNIZATION ADMIN EACH ADD: CPT | Mod: S$GLB,,, | Performed by: INTERNAL MEDICINE

## 2022-11-14 PROCEDURE — 99396 PREV VISIT EST AGE 40-64: CPT | Mod: 25,S$GLB,, | Performed by: INTERNAL MEDICINE

## 2022-11-14 PROCEDURE — 90686 IIV4 VACC NO PRSV 0.5 ML IM: CPT | Mod: S$GLB,,, | Performed by: INTERNAL MEDICINE

## 2022-11-14 PROCEDURE — 99396 PR PREVENTIVE VISIT,EST,40-64: ICD-10-PCS | Mod: 25,S$GLB,, | Performed by: INTERNAL MEDICINE

## 2022-11-14 PROCEDURE — 90471 FLU VACCINE (QUAD) GREATER THAN OR EQUAL TO 3YO PRESERVATIVE FREE IM: ICD-10-PCS | Mod: S$GLB,,, | Performed by: INTERNAL MEDICINE

## 2022-11-14 PROCEDURE — 90686 FLU VACCINE (QUAD) GREATER THAN OR EQUAL TO 3YO PRESERVATIVE FREE IM: ICD-10-PCS | Mod: S$GLB,,, | Performed by: INTERNAL MEDICINE

## 2022-11-14 PROCEDURE — 99999 PR PBB SHADOW E&M-EST. PATIENT-LVL V: CPT | Mod: PBBFAC,,, | Performed by: INTERNAL MEDICINE

## 2022-11-14 PROCEDURE — 90715 TDAP VACCINE 7 YRS/> IM: CPT | Mod: S$GLB,,, | Performed by: INTERNAL MEDICINE

## 2022-11-14 PROCEDURE — 90472 TDAP VACCINE GREATER THAN OR EQUAL TO 7YO IM: ICD-10-PCS | Mod: S$GLB,,, | Performed by: INTERNAL MEDICINE

## 2022-11-14 PROCEDURE — 90471 IMMUNIZATION ADMIN: CPT | Mod: S$GLB,,, | Performed by: INTERNAL MEDICINE

## 2022-11-14 PROCEDURE — 90715 TDAP VACCINE GREATER THAN OR EQUAL TO 7YO IM: ICD-10-PCS | Mod: S$GLB,,, | Performed by: INTERNAL MEDICINE

## 2022-11-14 PROCEDURE — 99999 PR PBB SHADOW E&M-EST. PATIENT-LVL V: ICD-10-PCS | Mod: PBBFAC,,, | Performed by: INTERNAL MEDICINE

## 2022-11-14 NOTE — PROGRESS NOTES
[facility] Note    Chief Complaint      Chief Complaint   Patient presents with    Annual Exam       History of Present Illness      Nael Bravo is a 51 y.o. male with chronic conditions of DM2, HTN, HLD, GERD, DURGA, depression who presents today for: annual preventative visit.  DM2: Controlled on metformin XR, basaglar 20 units, ozempic 2 mg. Sees Dr. Yuan, endocrinology.  A1C 6.6 last check.  Eye exam UTD 10/2022.  HTN: BP at goal on losartan-hctz.  HLD: Controlled on pravastatin, fenofibrate.  GERD: Controlled on protonix.   Depression: Controlled on effexor.    DURGA: Compliant with CPAP.    Flu shot today. TdAP today.  Pneumovax 2020.  COVID vaccines UTD.    Cscope previously scheduled.  Will readdress with Dr. Craig.    Past Medical History:  Past Medical History:   Diagnosis Date    Diabetes mellitus     Mixed hyperlipidemia 12/16/2020 11/24/2020 LABS  TG 2914 HDL 20 Fenofibrate ordered 11/    Perirectal abscess     Perirectal abscess        Past Surgical History:   has a past surgical history that includes Appendectomy; Sinus surgery (2006); and Incision and drainage of perirectal region (Left, 02/20/2019).    Family History:  family history includes Breast cancer in his maternal grandmother and paternal grandmother; Cancer in his maternal grandmother and paternal grandmother; Hypertension in his father.     Social History:  Social History     Tobacco Use    Smoking status: Former     Packs/day: 0.50     Years: 10.00     Pack years: 5.00     Types: Cigarettes    Smokeless tobacco: Never    Tobacco comments:     Quit for 7yrs in between, 6002-0262   Substance Use Topics    Alcohol use: No    Drug use: No       I personally reviewed all past medical, surgical, social and family history.    Review of Systems   Constitutional:  Negative for chills, fever and malaise/fatigue.   HENT:  Negative for hearing loss.    Eyes:  Negative for discharge.   Respiratory:  Negative for  "shortness of breath and wheezing.    Cardiovascular:  Negative for chest pain and palpitations.   Gastrointestinal:  Negative for blood in stool, constipation, diarrhea, nausea and vomiting.   Genitourinary:  Negative for hematuria and urgency.   Musculoskeletal:  Negative for neck pain.   Skin:  Negative for rash.   Neurological:  Negative for weakness and headaches.   Endo/Heme/Allergies:  Negative for polydipsia.   All other systems reviewed and are negative.     Medications:  Outpatient Encounter Medications as of 11/14/2022   Medication Sig Dispense Refill    albuterol (PROVENTIL/VENTOLIN HFA) 90 mcg/actuation inhaler Inhale 2 puffs into the lungs 4 (four) times daily.      azelastine (ASTELIN) 137 mcg (0.1 %) nasal spray Two sprays in each nostril, sniff until absorbed, then follow with 1 spray of fluticasone. Use both sprays twice daily. 30 mL 11    BASAGLAR KWIKPEN U-100 INSULIN glargine 100 units/mL SubQ pen Inject 35 units under the skin nightly. Increase per MD instruction up to max daily dose of 60 units (Patient taking differently: 20 Units. Inject 35 units under the skin nightly.  Increase per MD instruction up to max daily dose of 60 units) 15 mL 3    BD ULTRA-FINE OLIVER PEN NEEDLE 32 gauge x 5/32" Ndle Used to inject insulin 4 times a day 200 each 3    blood sugar diagnostic Strp Test once daily.  Dx E11.9.  Any formulary preferred brand. 100 each 11    blood-glucose meter kit Test once daily.  Dx E11.9.  Any formulary preferred brand. 1 each 0    fenofibrate 160 MG Tab Take 1 tablet (160 mg total) by mouth once daily. 90 tablet 3    fluticasone propionate (FLONASE) 50 mcg/actuation nasal spray One spray in each nostril twice daily after 1st using azelastine nasal spray 16 g 11    FREESTYLE MAGDALENA 2 SENSOR Kit AS directed 6 kit 2    ketoconazole (NIZORAL) 2 % cream Apply topically 2 (two) times daily. Prn flaking of the face 45 g 3    losartan-hydrochlorothiazide 50-12.5 mg (HYZAAR) 50-12.5 mg per " "tablet Take 2 tablets by mouth once daily. 180 tablet 3    metFORMIN (GLUCOPHAGE-XR) 500 MG ER 24hr tablet Take 2 tablets (1,000 mg total) by mouth 2 (two) times daily with meals. 360 tablet 3    montelukast (SINGULAIR) 10 mg tablet TAKE 1 TABLET BY MOUTH DAILY 90 tablet 3    omega-3 acid ethyl esters (LOVAZA) 1 gram capsule Take 2 capsules (2 g total) by mouth 2 (two) times daily. 120 capsule 3    pantoprazole (PROTONIX) 40 MG tablet TAKE 1 TABLET BY MOUTH DAILY 90 tablet 3    pravastatin (PRAVACHOL) 40 MG tablet Take 1 tablet (40 mg total) by mouth once daily. 90 tablet 3    semaglutide (OZEMPIC) 2 mg/dose (8 mg/3 mL) PnIj Inject 2 mg into the skin once a week. 3 mL 5    SURE COMFORT LANCETS 28 gauge Misc Test once daily. Dx E11.9. Any formulary preferred brand.      venlafaxine (EFFEXOR) 25 MG Tab TAKE 1 TABLET BY MOUTH DAILY 90 tablet 3     No facility-administered encounter medications on file as of 11/14/2022.       Allergies:  Review of patient's allergies indicates:  No Known Allergies    Health Maintenance:  Immunization History   Administered Date(s) Administered    COVID-19, MRNA, LN-S, PF (MODERNA FULL 0.5 ML DOSE) 03/31/2021, 05/20/2021, 01/21/2022    Influenza 09/19/2016, 09/10/2021    Influenza - Quadrivalent - PF *Preferred* (6 months and older) 12/29/2020, 11/14/2022    Influenza - Trivalent (ADULT) 03/08/2016    Pneumococcal Polysaccharide - 23 Valent 12/29/2020    Tdap 11/14/2022      Health Maintenance   Topic Date Due    Foot Exam  12/04/2021    Hemoglobin A1c  03/28/2023    Lipid Panel  09/28/2023    Eye Exam  10/03/2023    Low Dose Statin  10/06/2023    TETANUS VACCINE  11/14/2032    Hepatitis C Screening  Completed        Physical Exam      Vital Signs  Pulse: 67  SpO2: 97 %  BP: 132/86  BP Location: Right arm  Patient Position: Sitting  Pain Score: 0-No pain  Height and Weight  Height: 6' 2" (188 cm)  Weight: (!) 149.9 kg (330 lb 7.5 oz)  BSA (Calculated - sq m): 2.8 sq meters  BMI " (Calculated): 42.4  Weight in (lb) to have BMI = 25: 194.3]    Physical Exam  Vitals reviewed.   Constitutional:       Appearance: He is well-developed.   HENT:      Head: Normocephalic and atraumatic.      Right Ear: External ear normal.      Left Ear: External ear normal.   Cardiovascular:      Rate and Rhythm: Normal rate and regular rhythm.      Heart sounds: Normal heart sounds. No murmur heard.  Pulmonary:      Effort: Pulmonary effort is normal.      Breath sounds: Normal breath sounds. No wheezing or rales.   Abdominal:      General: Bowel sounds are normal.      Palpations: Abdomen is soft.        Laboratory:  CBC:  Recent Labs   Lab 11/24/20 0855   WBC 8.47   RBC 5.36   Hemoglobin 16.0   Hematocrit 45.9   Platelets 249   MCV 86   MCH 29.9   MCHC 34.9     CMP:  Recent Labs   Lab 11/24/20  0855 12/07/20  0810 01/26/21  0844 09/28/22  0945   Glucose 426 H 183 H   < > 132 H   Calcium 9.6 9.8   < > 9.6   Albumin 4.1 3.8  --  4.0   Total Protein 11.8 H 7.3  --  6.7   Sodium 127 L 141   < > 140   Potassium 5.5 H 3.9   < > 4.5   CO2 14 L 27   < > 26   Chloride 91 L 98   < > 104   BUN 16 10   < > 16   Alkaline Phosphatase 103 78  --  55   ALT 61 H 62 H  --  58 H   AST 64 H 41 H  --  36   Total Bilirubin 0.6 0.9  --  0.7    < > = values in this interval not displayed.     URINALYSIS:       LIPIDS:  Recent Labs   Lab 11/24/20  0855 01/26/21  0844 09/28/22  0945   TSH 2.342  --  2.311   HDL 20 L 28 L 27 L   Cholesterol 397 H 188 160   Triglycerides 2,914 H 226 H 191 H   LDL Cholesterol Invalid, Trig>400.0 114.8 94.8   HDL/Cholesterol Ratio 5.0 L 14.9 L 16.9 L   Non-HDL Cholesterol 377 160 133   Total Cholesterol/HDL Ratio 19.9 H 6.7 H 5.9 H     TSH:  Recent Labs   Lab 11/24/20  0855 09/28/22  0945   TSH 2.342 2.311     A1C:  Recent Labs   Lab 11/24/20  0855 07/01/21  0954 09/28/22  0945   Hemoglobin A1C 13.6 H 5.8 H 6.6 H       Assessment/Plan     Nael Bravo is a 51 y.o.male with:    1. Annual physical exam  - PSA,  Screening; Future  - TESTOSTERONE; Future  Discussed diet and exercise, vaccines and cancer screening, risk factors.  Screening labs ordered.     2. Type 2 diabetes mellitus without complication, without long-term current use of insulin  Continue current meds.  Discussed stopping metformin and considering changing to Mounjaro vs adding jardiance.  Pt will address with Dr. Yuan.  Eye exam UTD.  3. Essential hypertension  Continue current meds.    4. Hypertriglyceridemia  5. Mixed hyperlipidemia  Continue current meds.    6. GERD without esophagitis  Continue current meds.    7. DURGA (obstructive sleep apnea)  Cont CPAP  8. Mild recurrent major depression  Continue current meds.    9. Need for vaccination  - Influenza - Quadrivalent (PF)  - Tdap Vaccine    10. Fatigue, unspecified type    11. Hypogonadism in male  - TESTOSTERONE; Future     Chronic conditions status updated as per HPI.  Other than changes above, cont current medications and maintain follow up with specialists.  Follow up for Follow up visit.    Future Appointments   Date Time Provider Department Center   12/5/2022 11:15 AM Ugo Xiao MD [facility]   1/26/2023  8:00 AM Vidya Yuan MD [facility]   5/19/2023  9:45 AM Elpidio Robertson MD [facility]       Elpidio Robertson MD  [facility]                  Answers submitted by the patient for this visit:  Review of Systems Questionnaire (Submitted on 11/14/2022)  activity change: No  unexpected weight change: No  rhinorrhea: No  trouble swallowing: No  visual disturbance: No  chest tightness: No  polyuria: No  difficulty urinating: No  joint swelling: No  arthralgias: Yes  confusion: No  dysphoric mood: No

## 2022-11-18 ENCOUNTER — PATIENT MESSAGE (OUTPATIENT)
Dept: ADMINISTRATIVE | Facility: HOSPITAL | Age: 52
End: 2022-11-18
Payer: COMMERCIAL

## 2023-01-06 ENCOUNTER — PATIENT MESSAGE (OUTPATIENT)
Dept: SLEEP MEDICINE | Facility: CLINIC | Age: 53
End: 2023-01-06
Payer: COMMERCIAL

## 2023-01-06 DIAGNOSIS — G47.33 OSA (OBSTRUCTIVE SLEEP APNEA): Primary | ICD-10-CM

## 2023-02-08 ENCOUNTER — PATIENT MESSAGE (OUTPATIENT)
Dept: SLEEP MEDICINE | Facility: CLINIC | Age: 53
End: 2023-02-08
Payer: COMMERCIAL

## 2023-02-08 ENCOUNTER — PATIENT MESSAGE (OUTPATIENT)
Dept: ENDOCRINOLOGY | Facility: CLINIC | Age: 53
End: 2023-02-08
Payer: COMMERCIAL

## 2023-03-14 DIAGNOSIS — E11.9 TYPE 2 DIABETES MELLITUS WITHOUT COMPLICATION, WITHOUT LONG-TERM CURRENT USE OF INSULIN: ICD-10-CM

## 2023-03-14 RX ORDER — PRAVASTATIN SODIUM 40 MG/1
40 TABLET ORAL DAILY
Qty: 90 TABLET | Refills: 3 | Status: SHIPPED | OUTPATIENT
Start: 2023-03-14 | End: 2023-05-17

## 2023-03-30 ENCOUNTER — PATIENT MESSAGE (OUTPATIENT)
Dept: ENDOCRINOLOGY | Facility: CLINIC | Age: 53
End: 2023-03-30
Payer: COMMERCIAL

## 2023-03-31 RX ORDER — SEMAGLUTIDE 2.68 MG/ML
2 INJECTION, SOLUTION SUBCUTANEOUS WEEKLY
Qty: 3 ML | Refills: 5 | Status: SHIPPED | OUTPATIENT
Start: 2023-03-31 | End: 2023-05-17

## 2023-04-10 DIAGNOSIS — E78.1 HYPERTRIGLYCERIDEMIA: ICD-10-CM

## 2023-04-11 RX ORDER — FENOFIBRATE 160 MG/1
160 TABLET ORAL DAILY
Qty: 90 TABLET | Refills: 3 | Status: SHIPPED | OUTPATIENT
Start: 2023-04-11 | End: 2023-05-30 | Stop reason: SDUPTHER

## 2023-04-11 NOTE — TELEPHONE ENCOUNTER
Refill Routing Note   Medication(s) are not appropriate for processing by [facility] for the following reason(s):      Required labs outdated    [facility] action(s):  Defer Labs due            Appointments  past 12m or future 3m with PCP    Date Provider   Last Visit   11/14/2022 Elpidio Robertson MD   Next Visit   5/19/2023 Elpidio Robertson MD   ED visits in past 90 days: 0        Note composed:12:58 PM 04/11/2023

## 2023-04-11 NOTE — TELEPHONE ENCOUNTER
Care Due:                  Date            Visit Type   Department     Provider  --------------------------------------------------------------------------------                                MYCHART                              ANNUAL                              CHECKUP/PHY  [facility] PRIMARY  Last Visit: 11-      S            CARE           Elpidio Peck                               -                              PRIMARY  Next Visit: 05-      CARE ([facility])   None Found     Elpidio Peck                                                            Last  Test          Frequency    Reason                     Performed    Due Date  --------------------------------------------------------------------------------    CBC.........  12 months..  fenofibrate..............  Not Found    Overdue    [employer] Embedded Care Gaps. Reference number: [id number]. 4/11/2023   12:39:01 PM CDT

## 2023-04-12 ENCOUNTER — PATIENT MESSAGE (OUTPATIENT)
Dept: ENDOCRINOLOGY | Facility: CLINIC | Age: 53
End: 2023-04-12
Payer: COMMERCIAL

## 2023-04-13 DIAGNOSIS — E11.9 TYPE 2 DIABETES MELLITUS WITHOUT COMPLICATION: ICD-10-CM

## 2023-04-17 ENCOUNTER — PATIENT MESSAGE (OUTPATIENT)
Dept: ADMINISTRATIVE | Facility: HOSPITAL | Age: 53
End: 2023-04-17
Payer: COMMERCIAL

## 2023-05-17 DIAGNOSIS — E11.9 TYPE 2 DIABETES MELLITUS WITHOUT COMPLICATION, WITHOUT LONG-TERM CURRENT USE OF INSULIN: ICD-10-CM

## 2023-05-17 RX ORDER — SEMAGLUTIDE 2.68 MG/ML
2 INJECTION, SOLUTION SUBCUTANEOUS WEEKLY
Qty: 3 EACH | Refills: 3 | Status: SHIPPED | OUTPATIENT
Start: 2023-05-17 | End: 2024-01-30

## 2023-05-17 RX ORDER — PRAVASTATIN SODIUM 40 MG/1
40 TABLET ORAL DAILY
Qty: 90 TABLET | Refills: 0 | Status: SHIPPED | OUTPATIENT
Start: 2023-05-17 | End: 2024-01-30 | Stop reason: SDUPTHER

## 2023-05-30 DIAGNOSIS — E78.1 HYPERTRIGLYCERIDEMIA: ICD-10-CM

## 2023-05-30 DIAGNOSIS — I10 ESSENTIAL HYPERTENSION: ICD-10-CM

## 2023-05-30 RX ORDER — VENLAFAXINE 25 MG/1
25 TABLET ORAL DAILY
Qty: 90 TABLET | Refills: 3 | Status: SHIPPED | OUTPATIENT
Start: 2023-05-30 | End: 2023-10-03

## 2023-05-30 RX ORDER — PANTOPRAZOLE SODIUM 40 MG/1
40 TABLET, DELAYED RELEASE ORAL DAILY
Qty: 90 TABLET | Refills: 1 | Status: SHIPPED | OUTPATIENT
Start: 2023-05-30 | End: 2023-10-03

## 2023-05-30 RX ORDER — MONTELUKAST SODIUM 10 MG/1
10 TABLET ORAL DAILY
Qty: 90 TABLET | Refills: 1 | Status: SHIPPED | OUTPATIENT
Start: 2023-05-30 | End: 2023-10-03

## 2023-05-30 RX ORDER — LOSARTAN POTASSIUM AND HYDROCHLOROTHIAZIDE 12.5; 5 MG/1; MG/1
2 TABLET ORAL DAILY
Qty: 180 TABLET | Refills: 1 | Status: SHIPPED | OUTPATIENT
Start: 2023-05-30 | End: 2024-01-30 | Stop reason: SDUPTHER

## 2023-05-30 RX ORDER — FENOFIBRATE 160 MG/1
160 TABLET ORAL DAILY
Qty: 90 TABLET | Refills: 3 | Status: SHIPPED | OUTPATIENT
Start: 2023-05-30 | End: 2024-01-30 | Stop reason: SDUPTHER

## 2023-05-30 NOTE — TELEPHONE ENCOUNTER
Refill Routing Note   Medication(s) are not appropriate for processing by [facility] for the following reason(s):      Required labs outdated  Drug-disease interaction    [facility] action(s):  Defer  Approve None identified   Medication Therapy Plan: ESSENTIAL HTN    Alert overridden per protocol: Yes     Appointments  past 12m or future 3m with PCP    Date Provider   Last Visit   11/14/2022 Elpidio Robertson MD   Next Visit   Visit date not found Elpidio Robertson MD   ED visits in past 90 days: 0        Note composed:1:29 PM 05/30/2023

## 2023-05-30 NOTE — TELEPHONE ENCOUNTER
----- Message from Arianna Odom sent at 5/30/2023  8:51 AM CDT -----  Contact: [phone]  [facility] for the following medications:    All are refills, with 3 refill requests available, all for 90 day supply of medication     fenofibrate 160 MG Tab 90 tablet 3     losartan-hydrochlorothiazide 50-12.5 mg (HYZAAR) 50-12.5 mg per tablet      180 tablet     3    montelukast (SINGULAIR) 10 mg tablet 90 tablet 3     pantoprazole (PROTONIX) 40 MG tablet 90 tablet 3     venlafaxine (EFFEXOR) 25 MG Tab 90 tablet 3           [facility] - [address] [address]  Phone: [phone] Fax: [phone]

## 2023-05-30 NOTE — TELEPHONE ENCOUNTER
No care due was identified.  Health [facility] Embedded Care Due Messages. Reference number: [id number].   5/30/2023 12:32:28 PM CDT

## 2023-06-26 ENCOUNTER — PATIENT MESSAGE (OUTPATIENT)
Dept: ADMINISTRATIVE | Facility: HOSPITAL | Age: 53
End: 2023-06-26
Payer: COMMERCIAL

## 2023-09-28 ENCOUNTER — PATIENT MESSAGE (OUTPATIENT)
Dept: ADMINISTRATIVE | Facility: HOSPITAL | Age: 53
End: 2023-09-28
Payer: COMMERCIAL

## 2023-09-28 DIAGNOSIS — E11.9 TYPE 2 DIABETES MELLITUS WITHOUT COMPLICATION, UNSPECIFIED WHETHER LONG TERM INSULIN USE: ICD-10-CM

## 2023-10-03 RX ORDER — VENLAFAXINE 25 MG/1
25 TABLET ORAL
Qty: 90 TABLET | Refills: 3 | Status: SHIPPED | OUTPATIENT
Start: 2023-10-03 | End: 2024-01-30 | Stop reason: SDUPTHER

## 2023-10-03 RX ORDER — MONTELUKAST SODIUM 10 MG/1
10 TABLET ORAL
Qty: 90 TABLET | Refills: 0 | Status: SHIPPED | OUTPATIENT
Start: 2023-10-03 | End: 2024-01-10

## 2023-10-03 RX ORDER — PANTOPRAZOLE SODIUM 40 MG/1
40 TABLET, DELAYED RELEASE ORAL
Qty: 90 TABLET | Refills: 0 | Status: SHIPPED | OUTPATIENT
Start: 2023-10-03 | End: 2024-01-09

## 2023-10-03 NOTE — TELEPHONE ENCOUNTER
No care due was identified.  Health [facility] Embedded Care Due Messages. Reference number: [id number].   10/03/2023 2:04:32 PM CDT

## 2023-10-03 NOTE — TELEPHONE ENCOUNTER
Refill Routing Note   Medication(s) are not appropriate for processing by [facility] for the following reason(s):      Required labs outdated    [facility] action(s):  Defer Care Due:  None identified            Appointments  past 12m or future 3m with PCP    Date Provider   Last Visit   11/14/2022 Elpidio Robertson MD   Next Visit   1/5/2024 Elpidio Robertson MD   ED visits in past 90 days: 0        Note composed:2:19 PM 10/03/2023

## 2023-11-03 ENCOUNTER — PATIENT MESSAGE (OUTPATIENT)
Dept: ENDOCRINOLOGY | Facility: CLINIC | Age: 53
End: 2023-11-03
Payer: COMMERCIAL

## 2023-11-06 ENCOUNTER — PATIENT MESSAGE (OUTPATIENT)
Dept: ENDOCRINOLOGY | Facility: CLINIC | Age: 53
End: 2023-11-06
Payer: COMMERCIAL

## 2023-12-08 DIAGNOSIS — E78.1 HYPERTRIGLYCERIDEMIA: ICD-10-CM

## 2023-12-09 NOTE — TELEPHONE ENCOUNTER
Care Due:                  Date            Visit Type   Department     Provider  --------------------------------------------------------------------------------                                MYCHART                              ANNUAL                              CHECKUP/PHY  [facility] PRIMARY  Last Visit: 11-      S            CARE           Elpidio Peck                              [facility]                              FOLLOWUP/OF  [facility] PRIMARY  Next Visit: 01-      FICE VISIT   CARE           Elpidio Peck                                                            Last  Test          Frequency    Reason                     Performed    Due Date  --------------------------------------------------------------------------------    CBC.........  12 months..  fenofibrate..............  Not Found    Overdue    CMP.........  12 months..  fenofibrate,               09- 09-                             losartan-hydrochlorothiaz                             aditi, venlafaxine.........    Lipid Panel.  12 months..  fenofibrate..............  09- 09-    Health [facility] Embedded Care Due Messages. Reference number: [id number].   12/08/2023 6:54:31 PM CST

## 2023-12-10 NOTE — TELEPHONE ENCOUNTER
Refill Routing Note   Medication(s) are not appropriate for processing by [facility] for the following reason(s):        Required labs outdated    [facility] action(s):  Defer     Requires labs : Yes             Appointments  past 12m or future 3m with PCP    Date Provider   Last Visit   11/14/2022 Elpidio Robertson MD   Next Visit   1/5/2024 Elpidio Robertson MD   ED visits in past 90 days: 0        Note composed:2:12 PM 12/10/2023

## 2023-12-11 RX ORDER — OMEGA-3-ACID ETHYL ESTERS 1 G/1
2 CAPSULE, LIQUID FILLED ORAL 2 TIMES DAILY
Qty: 120 CAPSULE | Refills: 3 | Status: SHIPPED | OUTPATIENT
Start: 2023-12-11 | End: 2024-12-10

## 2024-01-09 RX ORDER — PANTOPRAZOLE SODIUM 40 MG/1
40 TABLET, DELAYED RELEASE ORAL
Qty: 90 TABLET | Refills: 0 | Status: SHIPPED | OUTPATIENT
Start: 2024-01-09 | End: 2024-01-31 | Stop reason: SDUPTHER

## 2024-01-09 NOTE — TELEPHONE ENCOUNTER
Refill Routing Note   Medication(s) are not appropriate for processing by [facility] for the following reason(s):        No active prescription written by provider    [facility] action(s):  Defer  Approve   Requires appointment : Yes        Medication Therapy Plan: The requested medication is not on the active medication list; Montelukast last filled in Dec 23 for 30 day supply      Appointments  past 12m or future 3m with PCP    Date Provider   Last Visit   11/14/2022 Elpidio Robertson MD   Next Visit   Visit date not found Elpidio Robertson MD   ED visits in past 90 days: 0        Note composed:3:20 PM 01/09/2024

## 2024-01-09 NOTE — TELEPHONE ENCOUNTER
Care Due:                  Date            Visit Type   Department     Provider  --------------------------------------------------------------------------------                                MYCHART                              ANNUAL                              CHECKUP/PHY  [facility] PRIMARY  Last Visit: 11-      S            SHELL Peck  Next Visit: None Scheduled  None         None Found                                                            Last  Test          Frequency    Reason                     Performed    Due Date  --------------------------------------------------------------------------------    Office Visit  15 months..  fenofibrate,               11- 02-                             losartan-hydrochlorothiaz                             aditi, montelukast,                             omega-3, pantoprazole,                             venlafaxine..............    [employer] Embedded Care Due Messages. Reference number: [id number].   1/09/2024 2:37:48 PM CST

## 2024-01-10 RX ORDER — MONTELUKAST SODIUM 10 MG/1
10 TABLET ORAL
Qty: 90 TABLET | Refills: 3 | Status: SHIPPED | OUTPATIENT
Start: 2024-01-10 | End: 2024-01-31 | Stop reason: SDUPTHER

## 2024-01-30 ENCOUNTER — OFFICE VISIT (OUTPATIENT)
Dept: PRIMARY CARE CLINIC | Facility: CLINIC | Age: 54
End: 2024-01-30
Payer: COMMERCIAL

## 2024-01-30 VITALS
OXYGEN SATURATION: 97 % | BODY MASS INDEX: 38.88 KG/M2 | WEIGHT: 302.94 LBS | HEART RATE: 62 BPM | DIASTOLIC BLOOD PRESSURE: 80 MMHG | HEIGHT: 74 IN | SYSTOLIC BLOOD PRESSURE: 136 MMHG

## 2024-01-30 DIAGNOSIS — K21.9 GERD WITHOUT ESOPHAGITIS: ICD-10-CM

## 2024-01-30 DIAGNOSIS — E11.9 TYPE 2 DIABETES MELLITUS WITHOUT COMPLICATION, WITHOUT LONG-TERM CURRENT USE OF INSULIN: Primary | ICD-10-CM

## 2024-01-30 DIAGNOSIS — F33.0 MILD RECURRENT MAJOR DEPRESSION: ICD-10-CM

## 2024-01-30 DIAGNOSIS — E78.1 HYPERTRIGLYCERIDEMIA: ICD-10-CM

## 2024-01-30 DIAGNOSIS — E78.2 MIXED HYPERLIPIDEMIA: ICD-10-CM

## 2024-01-30 DIAGNOSIS — Z12.5 SCREENING FOR MALIGNANT NEOPLASM OF PROSTATE: ICD-10-CM

## 2024-01-30 DIAGNOSIS — G47.33 OSA (OBSTRUCTIVE SLEEP APNEA): ICD-10-CM

## 2024-01-30 DIAGNOSIS — Z00.00 ANNUAL PHYSICAL EXAM: ICD-10-CM

## 2024-01-30 DIAGNOSIS — I10 ESSENTIAL HYPERTENSION: ICD-10-CM

## 2024-01-30 PROCEDURE — 99396 PREV VISIT EST AGE 40-64: CPT | Mod: S$GLB,,, | Performed by: INTERNAL MEDICINE

## 2024-01-30 PROCEDURE — 99999 PR PBB SHADOW E&M-EST. PATIENT-LVL III: CPT | Mod: PBBFAC,,, | Performed by: INTERNAL MEDICINE

## 2024-01-30 PROCEDURE — 99213 OFFICE O/P EST LOW 20 MIN: CPT | Mod: PBBFAC | Performed by: INTERNAL MEDICINE

## 2024-01-30 RX ORDER — FLUTICASONE PROPIONATE 50 MCG
1 SPRAY, SUSPENSION (ML) NASAL DAILY
Qty: 16 G | Refills: 1 | Status: SHIPPED | OUTPATIENT
Start: 2024-01-30

## 2024-01-30 RX ORDER — FENOFIBRATE 160 MG/1
160 TABLET ORAL DAILY
Qty: 90 TABLET | Refills: 3 | Status: SHIPPED | OUTPATIENT
Start: 2024-01-30 | End: 2024-01-31 | Stop reason: SDUPTHER

## 2024-01-30 RX ORDER — AZELASTINE 1 MG/ML
1 SPRAY, METERED NASAL DAILY
Qty: 30 ML | Refills: 1 | Status: SHIPPED | OUTPATIENT
Start: 2024-01-30

## 2024-01-30 RX ORDER — LOSARTAN POTASSIUM AND HYDROCHLOROTHIAZIDE 12.5; 5 MG/1; MG/1
1 TABLET ORAL DAILY
Qty: 90 TABLET | Refills: 3 | Status: SHIPPED | OUTPATIENT
Start: 2024-01-30 | End: 2024-02-23 | Stop reason: SDUPTHER

## 2024-01-30 RX ORDER — TIRZEPATIDE 5 MG/.5ML
5 INJECTION, SOLUTION SUBCUTANEOUS
Qty: 4 PEN | Refills: 4 | Status: SHIPPED | OUTPATIENT
Start: 2024-01-30

## 2024-01-30 RX ORDER — PRAVASTATIN SODIUM 40 MG/1
40 TABLET ORAL DAILY
Qty: 90 TABLET | Refills: 3 | Status: SHIPPED | OUTPATIENT
Start: 2024-01-30 | End: 2024-01-31 | Stop reason: SDUPTHER

## 2024-01-30 RX ORDER — VENLAFAXINE 25 MG/1
25 TABLET ORAL DAILY
Qty: 90 TABLET | Refills: 3 | Status: SHIPPED | OUTPATIENT
Start: 2024-01-30 | End: 2024-01-31 | Stop reason: SDUPTHER

## 2024-01-30 NOTE — TELEPHONE ENCOUNTER
Care Due:                  Date            Visit Type   Department     Provider  --------------------------------------------------------------------------------                                MYCHART                              FOLLOWUP/OF  [facility] PRIMARY  Last Visit: 01-      FICE VISIT   CARE           Elpidio Peck                              EP -                              PRIMARY      [facility] PRIMARY  Next Visit: 05-      CARE ([facility])   CARE           Elpidio Peck                                                            Last  Test          Frequency    Reason                     Performed    Due Date  --------------------------------------------------------------------------------    HBA1C.......  6 months...  tirzepatide..............  09- 03-    Health [facility] Embedded Care Due Messages. Reference number: [id number].   1/30/2024 8:34:54 AM CST

## 2024-01-30 NOTE — PROGRESS NOTES
[facility] Note    Chief Complaint      Chief Complaint   Patient presents with    Annual Exam       History of Present Illness      Nael Bravo is a 53 y.o. male with chronic conditions of DM2, HTN, HLD, GERD, DURGA, depression who presents today for: follow up chronic conditions.  DM2: Controlled on metformin XR, basaglar 20 units, ozempic 2 mg. Sees Dr. Yuan, endocrinology.  A1C 6.6 last check.  Eye exam UTD 10/2022.  HTN: BP at goal on losartan-hctz.  HLD: Controlled on pravastatin, fenofibrate.  GERD: Controlled on protonix.   Depression: Controlled on effexor.    DURGA: Compliant with CPAP.    Flu shot UTD. TdAP 2022.  Pneumovax 2020.  COVID vaccines UTD.    Cscope previously scheduled.  Due with Dr. Craig.     Past Medical History:  Past Medical History:   Diagnosis Date    Diabetes mellitus     Mixed hyperlipidemia 12/16/2020 11/24/2020 LABS  TG 2914 HDL 20 Fenofibrate ordered 11/    Perirectal abscess     Perirectal abscess        Past Surgical History:   has a past surgical history that includes Appendectomy; Sinus surgery (2006); and Incision and drainage of perirectal region (Left, 02/20/2019).    Family History:  family history includes Breast cancer in his maternal grandmother and paternal grandmother; Cancer in his maternal grandmother and paternal grandmother; Hypertension in his father.     Social History:  Social History     Tobacco Use    Smoking status: Former     Current packs/day: 0.50     Average packs/day: 0.5 packs/day for 10.0 years (5.0 ttl pk-yrs)     Types: Cigarettes    Smokeless tobacco: Never    Tobacco comments:     Quit for 7yrs in between, 3617-3618   Substance Use Topics    Alcohol use: No    Drug use: No       I personally reviewed all past medical, surgical, social and family history.    Review of Systems   Constitutional:  Negative for chills, fever and malaise/fatigue.   HENT:  Negative for hearing loss.    Eyes:  Negative for discharge.  "  Respiratory:  Negative for shortness of breath and wheezing.    Cardiovascular:  Negative for chest pain and palpitations.   Gastrointestinal:  Negative for constipation, diarrhea, nausea and vomiting.   Genitourinary:  Negative for hematuria.   Musculoskeletal:  Negative for neck pain.   Skin:  Negative for rash.   Neurological:  Negative for weakness and headaches.   All other systems reviewed and are negative.       Medications:  Outpatient Encounter Medications as of 1/30/2024   Medication Sig Dispense Refill    albuterol (PROVENTIL/VENTOLIN HFA) 90 mcg/actuation inhaler Inhale 2 puffs into the lungs 4 (four) times daily.      BD ULTRA-FINE OLIVER PEN NEEDLE 32 gauge x 5/32" Ndle Used to inject insulin 4 times a day 200 each 3    blood sugar diagnostic Strp Test once daily.  Dx E11.9.  Any formulary preferred brand. 100 each 11    blood-glucose meter kit Test once daily.  Dx E11.9.  Any formulary preferred brand. 1 each 0    FREESTYLE MAGDALENA 2 SENSOR Kit AS directed 6 kit 2    ketoconazole (NIZORAL) 2 % cream Apply topically 2 (two) times daily. Prn flaking of the face 45 g 3    losartan-hydrochlorothiazide 50-12.5 mg (HYZAAR) 50-12.5 mg per tablet Take 1 tablet by mouth once daily. 90 tablet 3    omega-3 acid ethyl esters (LOVAZA) 1 gram capsule Take 2 capsules (2 g total) by mouth 2 (two) times daily. 120 capsule 3    SURE COMFORT LANCETS 28 gauge Misc Test once daily. Dx E11.9. Any formulary preferred brand.      tirzepatide (MOUNJARO) 5 mg/0.5 mL PnIj Inject 5 mg into the skin every 7 days. 4 Pen 4    [DISCONTINUED] azelastine (ASTELIN) 137 mcg (0.1 %) nasal spray Two sprays in each nostril, sniff until absorbed, then follow with 1 spray of fluticasone. Use both sprays twice daily. 30 mL 11    [DISCONTINUED] BASAGLAR KWIKPEN U-100 INSULIN glargine 100 units/mL SubQ pen Inject 35 units under the skin nightly. Increase per MD instruction up to max daily dose of 60 units (Patient not taking: Reported on " 1/30/2024) 15 mL 3    [DISCONTINUED] fenofibrate 160 MG Tab Take 1 tablet (160 mg total) by mouth once daily. 90 tablet 3    [DISCONTINUED] fenofibrate 160 MG Tab Take 1 tablet (160 mg total) by mouth once daily. 90 tablet 3    [DISCONTINUED] fluticasone propionate (FLONASE) 50 mcg/actuation nasal spray One spray in each nostril twice daily after 1st using azelastine nasal spray 16 g 11    [DISCONTINUED] losartan-hydrochlorothiazide 50-12.5 mg (HYZAAR) 50-12.5 mg per tablet Take 2 tablets by mouth once daily. 180 tablet 1    [DISCONTINUED] metFORMIN (GLUCOPHAGE-XR) 500 MG ER 24hr tablet Take 2 tablets (1,000 mg total) by mouth 2 (two) times daily with meals. 360 tablet 3    [DISCONTINUED] montelukast (SINGULAIR) 10 mg tablet TAKE ONE TABLET BY MOUTH DAILY 90 tablet 3    [DISCONTINUED] pantoprazole (PROTONIX) 40 MG tablet TAKE ONE TABLET BY MOUTH DAILY 90 tablet 0    [DISCONTINUED] pravastatin (PRAVACHOL) 40 MG tablet Take 1 tablet (40 mg total) by mouth once daily. 90 tablet 0    [DISCONTINUED] pravastatin (PRAVACHOL) 40 MG tablet Take 1 tablet (40 mg total) by mouth once daily. 90 tablet 3    [DISCONTINUED] semaglutide (OZEMPIC) 2 mg/dose (8 mg/3 mL) PnIj Inject 2 mg into the skin once a week. (Patient not taking: Reported on 1/30/2024) 3 each 3    [DISCONTINUED] venlafaxine (EFFEXOR) 25 MG Tab TAKE 1 TABLET BY MOUTH DAILY 90 tablet 3    [DISCONTINUED] venlafaxine (EFFEXOR) 25 MG Tab Take 1 tablet (25 mg total) by mouth once daily. 90 tablet 3     No facility-administered encounter medications on file as of 1/30/2024.       Allergies:  Review of patient's allergies indicates:  No Known Allergies    Health Maintenance:  Immunization History   Administered Date(s) Administered    COVID-19, MRNA, LN-S, PF (MODERNA FULL 0.5 ML DOSE) 03/31/2021, 05/20/2021, 01/21/2022    Influenza 09/19/2016, 09/10/2021    Influenza - Quadrivalent - PF *Preferred* (6 months and older) 12/29/2020, 11/14/2022    Influenza - Trivalent  "(ADULT) 03/08/2016, 02/02/2021    Pneumococcal Conjugate - 13 Valent 02/02/2021    Pneumococcal Polysaccharide - 23 Valent 12/29/2020    Tdap 11/14/2022      Health Maintenance   Topic Date Due    Colorectal Cancer Screening  Never done    Shingles Vaccine (1 of 2) Never done    Foot Exam  12/04/2021    Hemoglobin A1c  03/28/2023    Lipid Panel  09/28/2023    Eye Exam  10/03/2023    Low Dose Statin  02/01/2025    TETANUS VACCINE  11/14/2032    Hepatitis C Screening  Completed        Physical Exam      Vital Signs  Pulse: 62  SpO2: 97 %  BP: 136/80  BP Location: Right arm  Patient Position: Sitting  Pain Score: 0-No pain  Height and Weight  Height: 6' 2" (188 cm)  Weight: (!) 137.4 kg (302 lb 14.6 oz)  BSA (Calculated - sq m): 2.68 sq meters  BMI (Calculated): 38.9  Weight in (lb) to have BMI = 25: 194.3]    Physical Exam  Vitals reviewed.   Constitutional:       Appearance: He is well-developed.   HENT:      Head: Normocephalic and atraumatic.      Right Ear: External ear normal.      Left Ear: External ear normal.   Cardiovascular:      Rate and Rhythm: Normal rate and regular rhythm.      Heart sounds: Normal heart sounds. No murmur heard.  Pulmonary:      Effort: Pulmonary effort is normal.      Breath sounds: Normal breath sounds. No wheezing or rales.   Abdominal:      General: Bowel sounds are normal.      Palpations: Abdomen is soft.          Laboratory:  CBC:      CMP:  Recent Labs   Lab 09/28/22  0945   Glucose 132 H   Calcium 9.6   Albumin 4.0   Total Protein 6.7   Sodium 140   Potassium 4.5   CO2 26   Chloride 104   BUN 16   Alkaline Phosphatase 55   ALT 58 H   AST 36   Total Bilirubin 0.7     URINALYSIS:       LIPIDS:  Recent Labs   Lab 09/28/22  0945   TSH 2.311   HDL 27 L   Cholesterol 160   Triglycerides 191 H   LDL Cholesterol 94.8   HDL/Cholesterol Ratio 16.9 L   Non-HDL Cholesterol 133   Total Cholesterol/HDL Ratio 5.9 H     TSH:  Recent Labs   Lab 09/28/22  0945   TSH 2.311     A1C:  Recent Labs "   Lab 07/01/21  0954 09/28/22  0945   Hemoglobin A1C 5.8 H 6.6 H       Assessment/Plan     Nael Bravo is a 53 y.o.male with:    1. Type 2 diabetes mellitus without complication, without long-term current use of insulin  - Comprehensive Metabolic Panel; Future  - Hemoglobin A1C; Future  - tirzepatide (MOUNJARO) 5 mg/0.5 mL PnIj; Inject 5 mg into the skin every 7 days.  Dispense: 4 Pen; Refill: 4  - Microalbumin/Creatinine Ratio, Urine; Future  Continue current meds, except change to mounjaro.  Update labs.  Eye exam UTD.  2. Essential hypertension  - losartan-hydrochlorothiazide 50-12.5 mg (HYZAAR) 50-12.5 mg per tablet; Take 1 tablet by mouth once daily.  Dispense: 90 tablet; Refill: 3  Continue current meds.    3. Mixed hyperlipidemia  - Lipid Panel; Future  Continue current meds.    4. GERD without esophagitis  - CBC Auto Differential; Future  Continue current meds.    5. DURGA (obstructive sleep apnea)  Cont CPAP.  6. Mild recurrent major depression  Continue current meds.    7. Annual physical exam  - CBC Auto Differential; Future  - Comprehensive Metabolic Panel; Future  - Lipid Panel; Future  - Hemoglobin A1C; Future  - TSH; Future  - PSA, Screening; Future    8. Screening for malignant neoplasm of prostate  - PSA, Screening; Future    9. Hypertriglyceridemia       Chronic conditions status updated as per HPI.  Other than changes above, cont current medications and maintain follow up with specialists.  No follow-ups on file.    Future Appointments   Date Time Provider Department Center   5/14/2024  8:15 AM Elpidio Robertson MD [facility]         Elpidio Robertson MD  [facility]                  Answers submitted by the patient for this visit:  Review of Systems Questionnaire (Submitted on 1/30/2024)  activity change: No  unexpected weight change: No  trouble swallowing: No  chest tightness: No  difficulty urinating: No  dysphoric mood: No

## 2024-01-31 ENCOUNTER — PATIENT MESSAGE (OUTPATIENT)
Dept: PRIMARY CARE CLINIC | Facility: CLINIC | Age: 54
End: 2024-01-31
Payer: COMMERCIAL

## 2024-01-31 DIAGNOSIS — E78.1 HYPERTRIGLYCERIDEMIA: ICD-10-CM

## 2024-01-31 DIAGNOSIS — E11.9 TYPE 2 DIABETES MELLITUS WITHOUT COMPLICATION, WITHOUT LONG-TERM CURRENT USE OF INSULIN: ICD-10-CM

## 2024-01-31 DIAGNOSIS — F33.0 MILD RECURRENT MAJOR DEPRESSION: ICD-10-CM

## 2024-01-31 NOTE — TELEPHONE ENCOUNTER
No care due was identified.  [facility] Embedded Care Due Messages. Reference number: [id number].   1/31/2024 12:05:55 PM CST

## 2024-02-01 RX ORDER — FENOFIBRATE 160 MG/1
160 TABLET ORAL DAILY
Qty: 90 TABLET | Refills: 3 | Status: SHIPPED | OUTPATIENT
Start: 2024-02-01 | End: 2025-01-26

## 2024-02-01 RX ORDER — PANTOPRAZOLE SODIUM 40 MG/1
40 TABLET, DELAYED RELEASE ORAL DAILY
Qty: 90 TABLET | Refills: 0 | Status: SHIPPED | OUTPATIENT
Start: 2024-02-01

## 2024-02-01 RX ORDER — VENLAFAXINE 25 MG/1
25 TABLET ORAL DAILY
Qty: 90 TABLET | Refills: 3 | Status: SHIPPED | OUTPATIENT
Start: 2024-02-01

## 2024-02-01 RX ORDER — PRAVASTATIN SODIUM 40 MG/1
40 TABLET ORAL DAILY
Qty: 90 TABLET | Refills: 3 | Status: SHIPPED | OUTPATIENT
Start: 2024-02-01

## 2024-02-01 RX ORDER — MONTELUKAST SODIUM 10 MG/1
10 TABLET ORAL DAILY
Qty: 90 TABLET | Refills: 3 | Status: SHIPPED | OUTPATIENT
Start: 2024-02-01

## 2024-02-23 DIAGNOSIS — I10 ESSENTIAL HYPERTENSION: ICD-10-CM

## 2024-02-23 RX ORDER — LOSARTAN POTASSIUM AND HYDROCHLOROTHIAZIDE 12.5; 5 MG/1; MG/1
1 TABLET ORAL DAILY
Qty: 90 TABLET | Refills: 3 | Status: SHIPPED | OUTPATIENT
Start: 2024-02-23

## 2024-02-23 NOTE — TELEPHONE ENCOUNTER
Care Due:                  Date            Visit Type   Department     Provider  --------------------------------------------------------------------------------                                MYCHART                              FOLLOWUP/OF  [facility]  Last Visit: 01-      [facility] VISIT   CARE           Elpidio Peck                              EP -                              PRIMARY      [facility]  Next Visit: 05-      CARE ([facility])   CARE           Elpidio Peck                                                            Last  Test          Frequency    Reason                     Performed    Due Date  --------------------------------------------------------------------------------    CBC.........  12 months..  fenofibrate..............  Not Found    Overdue    CMP.........  12 months..  fenofibrate,               09- 09-                             losartan-hydrochlorothiaz                             aditi, omega-3,                             pravastatin, venlafaxine.    Lipid Panel.  12 months..  fenofibrate, omega-3,      09- 09-                             pravastatin..............    [employer] Embedded Care Due Messages. Reference number: [id number].   2/23/2024 12:35:08 PM CST

## 2024-04-03 DIAGNOSIS — E11.9 TYPE 2 DIABETES MELLITUS WITHOUT COMPLICATION, UNSPECIFIED WHETHER LONG TERM INSULIN USE: ICD-10-CM

## 2024-04-08 ENCOUNTER — PATIENT MESSAGE (OUTPATIENT)
Dept: PRIMARY CARE CLINIC | Facility: CLINIC | Age: 54
End: 2024-04-08
Payer: COMMERCIAL

## 2024-04-10 ENCOUNTER — PATIENT MESSAGE (OUTPATIENT)
Dept: ADMINISTRATIVE | Facility: HOSPITAL | Age: 54
End: 2024-04-10
Payer: COMMERCIAL

## 2024-07-30 ENCOUNTER — PATIENT MESSAGE (OUTPATIENT)
Dept: PRIMARY CARE CLINIC | Facility: CLINIC | Age: 54
End: 2024-07-30
Payer: COMMERCIAL

## 2024-07-31 ENCOUNTER — LAB VISIT (OUTPATIENT)
Dept: LAB | Facility: HOSPITAL | Age: 54
End: 2024-07-31
Payer: COMMERCIAL

## 2024-07-31 DIAGNOSIS — E11.9 TYPE 2 DIABETES MELLITUS WITHOUT COMPLICATION, WITHOUT LONG-TERM CURRENT USE OF INSULIN: ICD-10-CM

## 2024-07-31 LAB
ALBUMIN/CREAT UR: 20.1 UG/MG (ref 0–30)
CREAT UR-MCNC: 169 MG/DL (ref 23–375)
MICROALBUMIN UR DL<=1MG/L-MCNC: 34 UG/ML

## 2024-07-31 PROCEDURE — 82570 ASSAY OF URINE CREATININE: CPT | Performed by: INTERNAL MEDICINE

## 2024-08-02 ENCOUNTER — OFFICE VISIT (OUTPATIENT)
Dept: PRIMARY CARE CLINIC | Facility: CLINIC | Age: 54
End: 2024-08-02
Payer: COMMERCIAL

## 2024-08-02 VITALS
HEIGHT: 74 IN | WEIGHT: 286.63 LBS | HEART RATE: 56 BPM | DIASTOLIC BLOOD PRESSURE: 80 MMHG | BODY MASS INDEX: 36.78 KG/M2 | SYSTOLIC BLOOD PRESSURE: 120 MMHG | OXYGEN SATURATION: 96 %

## 2024-08-02 DIAGNOSIS — Z12.11 ENCOUNTER FOR COLORECTAL CANCER SCREENING: ICD-10-CM

## 2024-08-02 DIAGNOSIS — E11.9 TYPE 2 DIABETES MELLITUS WITHOUT COMPLICATION, WITHOUT LONG-TERM CURRENT USE OF INSULIN: Primary | ICD-10-CM

## 2024-08-02 DIAGNOSIS — E78.2 MIXED HYPERLIPIDEMIA: ICD-10-CM

## 2024-08-02 DIAGNOSIS — E66.01 SEVERE OBESITY (BMI 35.0-39.9) WITH COMORBIDITY: ICD-10-CM

## 2024-08-02 DIAGNOSIS — F33.0 MILD RECURRENT MAJOR DEPRESSION: ICD-10-CM

## 2024-08-02 DIAGNOSIS — G47.33 OSA (OBSTRUCTIVE SLEEP APNEA): ICD-10-CM

## 2024-08-02 DIAGNOSIS — K21.9 GERD WITHOUT ESOPHAGITIS: ICD-10-CM

## 2024-08-02 DIAGNOSIS — Z12.12 ENCOUNTER FOR COLORECTAL CANCER SCREENING: ICD-10-CM

## 2024-08-02 DIAGNOSIS — I10 ESSENTIAL HYPERTENSION: ICD-10-CM

## 2024-08-02 DIAGNOSIS — J30.9 ALLERGIC RHINITIS, UNSPECIFIED SEASONALITY, UNSPECIFIED TRIGGER: ICD-10-CM

## 2024-08-02 PROCEDURE — 99999 PR PBB SHADOW E&M-EST. PATIENT-LVL III: CPT | Mod: PBBFAC,,, | Performed by: INTERNAL MEDICINE

## 2024-08-02 RX ORDER — MONTELUKAST SODIUM 10 MG/1
10 TABLET ORAL DAILY
Qty: 90 TABLET | Refills: 3 | Status: SHIPPED | OUTPATIENT
Start: 2024-08-02

## 2024-08-02 RX ORDER — AZELASTINE 1 MG/ML
1 SPRAY, METERED NASAL DAILY
Qty: 30 ML | Refills: 3 | Status: SHIPPED | OUTPATIENT
Start: 2024-08-02

## 2024-08-02 RX ORDER — VENLAFAXINE HYDROCHLORIDE 75 MG/1
75 CAPSULE, EXTENDED RELEASE ORAL DAILY
Qty: 30 CAPSULE | Refills: 11 | Status: SHIPPED | OUTPATIENT
Start: 2024-08-02 | End: 2025-08-02

## 2024-08-02 RX ORDER — TIRZEPATIDE 5 MG/.5ML
5 INJECTION, SOLUTION SUBCUTANEOUS
Qty: 4 PEN | Refills: 4 | Status: SHIPPED | OUTPATIENT
Start: 2024-08-02

## 2024-08-02 RX ORDER — FLUTICASONE PROPIONATE 50 MCG
1 SPRAY, SUSPENSION (ML) NASAL DAILY
Qty: 16 G | Refills: 3 | Status: SHIPPED | OUTPATIENT
Start: 2024-08-02

## 2024-08-02 NOTE — PROGRESS NOTES
[facility] Note    Chief Complaint      Chief Complaint   Patient presents with    Diabetes     6 month        History of Present Illness      History of Present Illness  The patient presents for evaluation of multiple medical concerns.    He has been experiencing significant stress since losing his job in 08/2023. To manage his anxiety, he is considering increasing his dose of Effexor from 25 mg.     He has been unable to obtain insulin, a medication he has been on since 08/2023, due to insurance issues. Metformin was suggested as an alternative, but it causes severe bloating and stomach discomfort. Ozempic, another alternative, also resulted in gastrointestinal side effects. He discontinued Basaglar when he started Ozempic. He has made dietary changes to help manage his diabetes.    He has not had a recent eye examination, with the last one occurring after his diabetes diagnosis. He experiences blurry vision when looking at his phone, which he attributes to astigmatism in his right eye.    He continues to take medication for acid reflux. He uses Astelin and Flonase intermittently for severe allergies.    He has not yet undergone a colonoscopy. He received a Cologuard kit but chose not to use it.    His physical activity is limited, consisting mainly of walking.    He is on a low dose of blood pressure medication. He was supposed to go back and have an echocardiogram. He was having palpitations, but they stopped about a month ago. He thinks that some pill he is taking is causing the palpitations. He does take artificial sweeteners.  DM2: Trying to control with diet.  Off insulin after starting Ozempic.  Pt is intolerant to metformin due to abdominal bloating and diarrhea.  Then had to discontinue ozempic due to GI side effects as well.  Previously [facility] Dr[facility] Yuan, [facility]in[facility]  A1C 7.1, up from 6.6.  Eye exam due, last 10/2022.  HTN: BP at goal on losartan-hctz.  Sees Dr. Garcia, cardiology, as well  HLD: Controlled on pravastatin, fenofibrate.  GERD: Controlled on protonix.   Depression: incompletely controlled on effexor.    DURGA: Compliant with CPAP.    Flu shot UTD. TdAP 2022.  Pneumovax 2020.  COVID vaccines UTD.    Cscope previously scheduled.  Due with Dr. Craig.     Assessment/Plan     Nael Bravo is a 53 y.o.male with:    Assessment & Plan  1. Diabetes Mellitus.  He has been off insulin since August of last year and experienced gastrointestinal side effects with Ozempic. Mounjaro will be prescribed pending prior authorization. [facility].    2. Hyperlipidemia.  He has restarted fenofibrate for elevated triglycerides and pravastatin for cholesterol management. Blood work will be repeated in 6 months to monitor lipid levels.    3. Anxiety.  He is currently on Effexor 25 mg and reports some improvement. The dosage will be increased to 75 mg to better manage his anxiety symptoms. Effexor is not expected to cause sexual side effects and may help with anxiety-related stomach issues.    4. Allergic Rhinitis.  Allergy medications, including Singulair, Flonase, and azelastine, have been refilled to manage symptoms.    5. Health Maintenance.  A Cologuard test will be ordered for colon cancer screening. He is advised to have an annual diabetic eye exam to monitor for diabetic retinopathy.      1. Type 2 diabetes mellitus without complication, without long-term current use of insulin  - tirzepatide (MOUNJARO) 5 mg/0.5 mL PnIj; Inject 5 mg into the skin every 7 days.  Dispense: 4 Pen; Refill: 4  - Ambulatory referral/consult to Optometry; Future  Trying again for Mounjaro coverage.  Pt has tried and failed metformin and Ozempic due to tolerability issues.  Eye exam referred  2. Essential hypertension  Continue current meds.    3. Mixed hyperlipidemia  Continue current meds.    4. GERD without esophagitis  Continue  current meds.    5. Mild recurrent major depression  - venlafaxine (EFFEXOR-XR) 75 MG 24 hr capsule; Take 1 capsule (75 mg total) by mouth once daily.  Dispense: 30 capsule; Refill: 11  Increase effexor  6. DURGA (obstructive sleep apnea)  Cont CPAP  7. Severe obesity (BMI 35.0-39.9) with comorbidity  Counseled on diet and exercise.  Losing weight  8. Allergic rhinitis, unspecified seasonality, unspecified trigger  - azelastine (ASTELIN) 137 mcg (0.1 %) nasal spray; 1 spray (137 mcg total) by Nasal route once daily.  Dispense: 30 mL; Refill: 3  - fluticasone propionate (FLONASE) 50 mcg/actuation nasal spray; 1 spray (50 mcg total) by Each Nostril route once daily.  Dispense: 16 g; Refill: 3  - montelukast (SINGULAIR) 10 mg tablet; Take 1 tablet (10 mg total) by mouth once daily.  Dispense: 90 tablet; Refill: 3      Chronic conditions status updated as per HPI.  Other than changes above, cont current medications and maintain follow up with specialists.  Follow up in about 6 months (around 2/2/2025) for Follow up visit.    Future Appointments   Date Time Provider Department Center   9/30/2024  3:00 PM Elpidio Robertson MD [facility] PRICRE [place]   10/29/2024 11:30 AM Litzy Jarvis MD [facility] SLEEP [facility]           Past Medical History:  Past Medical History:   Diagnosis Date    Diabetes mellitus     Mixed hyperlipidemia 12/16/2020 11/24/2020 LABS  TG 2914 HDL 20 Fenofibrate ordered 11/    Perirectal abscess     Perirectal abscess        Past Surgical History:   has a past surgical history that includes Appendectomy; Sinus surgery (2006); and Incision and drainage of perirectal region (Left, 02/20/2019).    Family History:  family history includes Breast cancer in his maternal grandmother and paternal grandmother; Cancer in his maternal grandmother and paternal grandmother; Hypertension in his father.     Social History:  Social History     Tobacco Use    Smoking status: Former     Current  "packs/day: 0.50     Average packs/day: 0.5 packs/day for 10.0 years (5.0 ttl pk-yrs)     Types: Cigarettes    Smokeless tobacco: Never    Tobacco comments:     Quit for 7yrs in between, [id number]   Substance Use Topics    Alcohol use: No    Drug use: No       Medications:  Outpatient Encounter Medications as of 8/2/2024   Medication Sig Dispense Refill    albuterol (PROVENTIL/VENTOLIN HFA) 90 mcg/actuation inhaler Inhale 2 puffs into the lungs every 4 (four) hours as needed. 18 g 1    azelastine (ASTELIN) 137 mcg (0.1 %) nasal spray 1 spray (137 mcg total) by Nasal route once daily. 30 mL 3    BD ULTRA-FINE OLIVER PEN NEEDLE 32 gauge x 5/32" Ndle Used to inject insulin 4 times a day 200 each 3    blood sugar diagnostic Strp Test once daily.  Dx E11.9.  Any formulary preferred brand. 100 each 11    blood-glucose meter kit Test once daily.  Dx E11.9.  Any formulary preferred brand. 1 each 0    fenofibrate 160 MG Tab Take 1 tablet (160 mg total) by mouth once daily. 90 tablet 3    fluticasone propionate (FLONASE) 50 mcg/actuation nasal spray 1 spray (50 mcg total) by Each Nostril route once daily. 16 g 3    FREESTYLE MAGDALENA 2 SENSOR Kit AS directed 6 kit 2    losartan-hydrochlorothiazide 50-12.5 mg (HYZAAR) 50-12.5 mg per tablet Take 1 tablet by mouth once daily. 90 tablet 3    montelukast (SINGULAIR) 10 mg tablet Take 1 tablet (10 mg total) by mouth once daily. 90 tablet 3    olopatadine (PATADAY) 0.2 % Drop instill 1 drop into affected eye  Once a day 2.5 mL 5    omega-3 acid ethyl esters (LOVAZA) 1 gram capsule Take 2 capsules (2 g total) by mouth 2 (two) times daily. 120 capsule 3    pantoprazole (PROTONIX) 40 MG tablet Take 1 tablet (40 mg total) by mouth once daily. 90 tablet 0    pravastatin (PRAVACHOL) 40 MG tablet Take 1 tablet (40 mg total) by mouth once daily. 90 tablet 3    SURE COMFORT LANCETS 28 gauge Misc Test once daily. Dx E11.9. Any formulary preferred brand.      tirzepatide (MOUNJARO) 5 mg/0.5 mL Josselin " Inject 5 mg into the skin every 7 days. 4 Pen 4    venlafaxine (EFFEXOR-XR) 75 MG 24 hr capsule Take 1 capsule (75 mg total) by mouth once daily. 30 capsule 11    [DISCONTINUED] albuterol (PROVENTIL/VENTOLIN HFA) 90 mcg/actuation inhaler Inhale 2 puffs into the lungs 4 (four) times daily.      [DISCONTINUED] azelastine (ASTELIN) 137 mcg (0.1 %) nasal spray 1 spray (137 mcg total) by Nasal route once daily. 30 mL 1    [DISCONTINUED] fluticasone propionate (FLONASE) 50 mcg/actuation nasal spray 1 spray (50 mcg total) by Each Nostril route once daily. 16 g 1    [DISCONTINUED] ketoconazole (NIZORAL) 2 % cream Apply topically 2 (two) times daily. Prn flaking of the face 45 g 3    [DISCONTINUED] montelukast (SINGULAIR) 10 mg tablet Take 1 tablet (10 mg total) by mouth once daily. 90 tablet 3    [DISCONTINUED] predniSONE (DELTASONE) 10 MG tablet take 2 tablets by mouth 2 times a day for 3 days, then 2 tablets once a day for 3 days, then 1 tablet once a day for 3 days 21 tablet 0    [DISCONTINUED] tirzepatide (MOUNJARO) 5 mg/0.5 mL PnIj Inject 5 mg into the skin every 7 days. 4 Pen 4    [DISCONTINUED] venlafaxine (EFFEXOR) 25 MG Tab Take 1 tablet (25 mg total) by mouth once daily. 90 tablet 3     No facility-administered encounter medications on file as of 8/2/2024.       Allergies:  Review of patient's allergies indicates:  No Known Allergies    Health Maintenance:  Immunization History   Administered Date(s) Administered    COVID-19 MRNA, LN-S PF (MODERNA HALF 0.25 ML DOSE) 01/21/2022    COVID-19, MRNA, LN-S, PF (MODERNA FULL 0.5 ML DOSE) 03/31/2021, 05/20/2021    Influenza 09/19/2016, 09/10/2021    Influenza - Quadrivalent - PF *Preferred* (6 months and older) 12/29/2020, 11/14/2022    Influenza - Trivalent (ADULT) 03/08/2016, 02/02/2021    Pneumococcal Conjugate - 13 Valent 02/02/2021    Pneumococcal Polysaccharide - 23 Valent 12/29/2020    Tdap 11/14/2022      Health Maintenance   Topic Date Due    Colorectal Cancer  "Screening  Never done    Shingles Vaccine (1 of 2) Never done    Foot Exam  12/04/2021    Eye Exam  10/03/2023    Hemoglobin A1c  01/31/2025    Low Dose Statin  02/01/2025    Lipid Panel  07/31/2025    TETANUS VACCINE  11/14/2032    Hepatitis C Screening  Completed        Physical Exam      Vital Signs  Pulse: (!) 56  SpO2: 96 %  BP: 120/80  BP Location: Right arm  Patient Position: Sitting  Pain Score: 0-No pain  Height and Weight  Height: 6' 2" (188 cm)  Weight: 130 kg (286 lb 9.6 oz)  BSA (Calculated - sq m): 2.61 sq meters  BMI (Calculated): 36.8  Weight in (lb) to have BMI = 25: 194.3]    Physical Exam  Vital Signs  Patient's weight is 286 pounds.    Physical Exam  Vitals reviewed.   Constitutional:       Appearance: He is well-developed.   HENT:      Head: Normocephalic and atraumatic.      Right Ear: External ear normal.      Left Ear: External ear normal.   Cardiovascular:      Rate and Rhythm: Normal rate and regular rhythm.      Heart sounds: Normal heart sounds. No murmur heard.  Pulmonary:      Effort: Pulmonary effort is normal.      Breath sounds: Normal breath sounds. No wheezing or rales.   Abdominal:      General: Bowel sounds are normal.      Palpations: Abdomen is soft.         Laboratory:    Results  Laboratory Studies  A1c was 7.1.    CBC:  Recent Labs   Lab 07/31/24  1108   WBC 13.42 H   RBC 5.54   Hemoglobin 15.4   Hematocrit 48.2   Platelets 276   MCV 87   MCH 27.8   MCHC 32.0     CMP:  Recent Labs   Lab 09/28/22  0945 07/31/24  1108   Glucose 132 H 139 H   Calcium 9.6 10.1   Albumin 4.0 4.1   Total Protein 6.7 7.6   Sodium 140 142   Potassium 4.5 4.5   CO2 26 25   Chloride 104 106   BUN 16 14   Alkaline Phosphatase 55 66   ALT 58 H 39   AST 36 22   Total Bilirubin 0.7 0.6     URINALYSIS:       LIPIDS:  Recent Labs   Lab 09/28/22  0945 07/31/24  1108   TSH 2.311 1.724   HDL 27 L 33 L   Cholesterol 160 221 H   Triglycerides 191 H 307 H   LDL Cholesterol 94.8 126.6   HDL/Cholesterol Ratio 16.9 " L 14.9 L   Non-HDL Cholesterol 133 188   Total Cholesterol/HDL Ratio 5.9 H 6.7 H     TSH:  Recent Labs   Lab 09/28/22  0945 07/31/24  1108   TSH 2.311 1.724     A1C:  Recent Labs   Lab 09/28/22  0945 07/31/24  1108   Hemoglobin A1C 6.6 H 7.1 H           This note was generated with the assistance of ambient listening technology. Verbal consent was obtained by the patient and accompanying visitor(s) for the recording of patient appointment to facilitate this note. I attest to having reviewed and edited the generated note for accuracy, though some syntax or spelling errors may persist. Please contact the author of this note for any clarification.      Elpidio Robertson MD  [facility]                  Answers submitted by the patient for this visit:  Review of Systems Questionnaire (Submitted on 7/30/2024)  activity change: No  unexpected weight change: No  neck pain: No  hearing loss: No  rhinorrhea: No  trouble swallowing: No  eye discharge: No  visual disturbance: No  chest tightness: No  wheezing: No  chest pain: No  palpitations: No  blood in stool: No  constipation: No  vomiting: No  diarrhea: No  polydipsia: No  polyuria: No  difficulty urinating: No  urgency: No  hematuria: No  joint swelling: No  arthralgias: Yes  headaches: Yes  weakness: No  confusion: No  dysphoric mood: No

## 2024-08-08 ENCOUNTER — OFFICE VISIT (OUTPATIENT)
Dept: URGENT CARE | Facility: CLINIC | Age: 54
End: 2024-08-08
Payer: COMMERCIAL

## 2024-08-08 VITALS
BODY MASS INDEX: 36.57 KG/M2 | HEART RATE: 80 BPM | OXYGEN SATURATION: 95 % | DIASTOLIC BLOOD PRESSURE: 81 MMHG | TEMPERATURE: 99 F | SYSTOLIC BLOOD PRESSURE: 144 MMHG | WEIGHT: 285 LBS | RESPIRATION RATE: 18 BRPM | HEIGHT: 74 IN

## 2024-08-08 DIAGNOSIS — J01.90 ACUTE BACTERIAL SINUSITIS: Primary | ICD-10-CM

## 2024-08-08 DIAGNOSIS — B96.89 ACUTE BACTERIAL SINUSITIS: Primary | ICD-10-CM

## 2024-08-08 LAB
CTP QC/QA: YES
SARS-COV-2 AG RESP QL IA.RAPID: NEGATIVE

## 2024-08-08 RX ORDER — CEFDINIR 300 MG/1
300 CAPSULE ORAL EVERY 12 HOURS
Qty: 14 CAPSULE | Refills: 0 | Status: SHIPPED | OUTPATIENT
Start: 2024-08-08 | End: 2024-08-15

## 2024-08-08 RX ORDER — DEXAMETHASONE SODIUM PHOSPHATE 10 MG/ML
10 INJECTION INTRAMUSCULAR; INTRAVENOUS ONCE
Status: COMPLETED | OUTPATIENT
Start: 2024-08-08 | End: 2024-08-08

## 2024-08-08 RX ADMIN — DEXAMETHASONE SODIUM PHOSPHATE 10 MG: 10 INJECTION INTRAMUSCULAR; INTRAVENOUS at 03:08

## 2024-09-18 ENCOUNTER — PATIENT MESSAGE (OUTPATIENT)
Dept: PRIMARY CARE CLINIC | Facility: CLINIC | Age: 54
End: 2024-09-18
Payer: COMMERCIAL

## 2024-09-18 NOTE — TELEPHONE ENCOUNTER
No care due was identified.  [facility] Embedded Care Due Messages. Reference number: [id number].   9/18/2024 1:41:52 PM CDT

## 2024-09-18 NOTE — LETTER
September 20, 2024    Nael Bravo  [address] [id number]             [facility]  [address] [address]  Phone: [phone] To whom it may concern:    Mr. Nael Bravo is a patient under my care as primary care physician. As such, I am assisting him in the management of his type 2 diabetes melitus.  I am recommending and prescribing Mounjaro (tirzepatide).  He has a long history (much longer than 6 months) of intolerance to metformin and extended release metformin, both of which cause persistent abdominal bloating and discomfort.  These are known side effects to metformin.  He has also tried Ozempic (semaglutide) but caused nausea and constipation. He has also recently been on insulin.  I am recommending he start on Mounjaro and that this medication be covered due to lack of alternatives.  I do not recommend yet another trial of metformin in order to qualify for coverage for Mounjaro.    Please contact my office for any additional information.    Respectfully,         Elpidio Robertson MD

## 2024-09-18 NOTE — TELEPHONE ENCOUNTER
Pt is requesting assistance with getting Mounjaro approved. Pt stated insurnace wants him to take metformin for 6 months. Pt states he has had issues with metformin.

## 2024-09-19 RX ORDER — PANTOPRAZOLE SODIUM 40 MG/1
40 TABLET, DELAYED RELEASE ORAL DAILY
Qty: 90 TABLET | Refills: 3 | Status: SHIPPED | OUTPATIENT
Start: 2024-09-19

## 2024-09-19 NOTE — TELEPHONE ENCOUNTER
Refill Decision Note   Nael Bravo  is requesting a refill authorization.  Brief Assessment and Rationale for Refill:  Approve     Medication Therapy Plan:         Comments:     Note composed:11:42 AM 09/19/2024

## 2024-09-20 RX ORDER — METFORMIN HYDROCHLORIDE 500 MG/1
500 TABLET ORAL 2 TIMES DAILY WITH MEALS
Qty: 60 TABLET | Refills: 3 | Status: SHIPPED | OUTPATIENT
Start: 2024-09-20 | End: 2025-09-20

## 2024-09-20 NOTE — TELEPHONE ENCOUNTER
Prescription sent for metformin as required by insurance.  Please have pt message back if any side effects

## 2024-10-29 ENCOUNTER — PATIENT MESSAGE (OUTPATIENT)
Dept: SLEEP MEDICINE | Facility: CLINIC | Age: 54
End: 2024-10-29

## 2024-10-30 ENCOUNTER — OFFICE VISIT (OUTPATIENT)
Dept: SLEEP MEDICINE | Facility: CLINIC | Age: 54
End: 2024-10-30
Attending: PSYCHIATRY & NEUROLOGY
Payer: COMMERCIAL

## 2024-10-30 VITALS
SYSTOLIC BLOOD PRESSURE: 145 MMHG | DIASTOLIC BLOOD PRESSURE: 86 MMHG | HEIGHT: 74 IN | BODY MASS INDEX: 37.72 KG/M2 | HEART RATE: 53 BPM | WEIGHT: 293.88 LBS

## 2024-10-30 DIAGNOSIS — G47.33 OSA (OBSTRUCTIVE SLEEP APNEA): ICD-10-CM

## 2024-10-30 DIAGNOSIS — G47.00 INSOMNIA, UNSPECIFIED TYPE: Primary | ICD-10-CM

## 2024-10-30 PROCEDURE — 3066F NEPHROPATHY DOC TX: CPT | Mod: CPTII,S$GLB,, | Performed by: PSYCHIATRY & NEUROLOGY

## 2024-10-30 PROCEDURE — 99999 PR PBB SHADOW E&M-EST. PATIENT-LVL III: CPT | Mod: PBBFAC,,, | Performed by: PSYCHIATRY & NEUROLOGY

## 2024-10-30 PROCEDURE — 3077F SYST BP >= 140 MM HG: CPT | Mod: CPTII,S$GLB,, | Performed by: PSYCHIATRY & NEUROLOGY

## 2024-10-30 PROCEDURE — 3051F HG A1C>EQUAL 7.0%<8.0%: CPT | Mod: CPTII,S$GLB,, | Performed by: PSYCHIATRY & NEUROLOGY

## 2024-10-30 PROCEDURE — 99214 OFFICE O/P EST MOD 30 MIN: CPT | Mod: S$GLB,,, | Performed by: PSYCHIATRY & NEUROLOGY

## 2024-10-30 PROCEDURE — 3061F NEG MICROALBUMINURIA REV: CPT | Mod: CPTII,S$GLB,, | Performed by: PSYCHIATRY & NEUROLOGY

## 2024-10-30 PROCEDURE — 3079F DIAST BP 80-89 MM HG: CPT | Mod: CPTII,S$GLB,, | Performed by: PSYCHIATRY & NEUROLOGY

## 2024-10-30 PROCEDURE — 3008F BODY MASS INDEX DOCD: CPT | Mod: CPTII,S$GLB,, | Performed by: PSYCHIATRY & NEUROLOGY

## 2024-10-30 RX ORDER — ZALEPLON 5 MG/1
CAPSULE ORAL
Qty: 60 CAPSULE | Refills: 5 | Status: SHIPPED | OUTPATIENT
Start: 2024-10-30

## 2024-10-31 ENCOUNTER — PATIENT MESSAGE (OUTPATIENT)
Dept: SLEEP MEDICINE | Facility: CLINIC | Age: 54
End: 2024-10-31
Payer: COMMERCIAL

## 2025-02-07 ENCOUNTER — OFFICE VISIT (OUTPATIENT)
Dept: PRIMARY CARE CLINIC | Facility: CLINIC | Age: 55
End: 2025-02-07
Payer: COMMERCIAL

## 2025-02-07 VITALS
OXYGEN SATURATION: 96 % | BODY MASS INDEX: 36.53 KG/M2 | HEART RATE: 70 BPM | WEIGHT: 284.63 LBS | DIASTOLIC BLOOD PRESSURE: 80 MMHG | SYSTOLIC BLOOD PRESSURE: 130 MMHG | HEIGHT: 74 IN

## 2025-02-07 DIAGNOSIS — E78.2 MIXED HYPERLIPIDEMIA: ICD-10-CM

## 2025-02-07 DIAGNOSIS — G47.33 OSA (OBSTRUCTIVE SLEEP APNEA): ICD-10-CM

## 2025-02-07 DIAGNOSIS — Z00.00 ANNUAL PHYSICAL EXAM: Primary | ICD-10-CM

## 2025-02-07 DIAGNOSIS — E66.01 SEVERE OBESITY (BMI 35.0-39.9) WITH COMORBIDITY: ICD-10-CM

## 2025-02-07 DIAGNOSIS — E78.1 HYPERTRIGLYCERIDEMIA: ICD-10-CM

## 2025-02-07 DIAGNOSIS — I10 ESSENTIAL HYPERTENSION: ICD-10-CM

## 2025-02-07 DIAGNOSIS — E11.9 TYPE 2 DIABETES MELLITUS WITHOUT COMPLICATION, WITHOUT LONG-TERM CURRENT USE OF INSULIN: ICD-10-CM

## 2025-02-07 DIAGNOSIS — K21.9 GERD WITHOUT ESOPHAGITIS: ICD-10-CM

## 2025-02-07 DIAGNOSIS — F33.0 MILD RECURRENT MAJOR DEPRESSION: ICD-10-CM

## 2025-02-07 PROCEDURE — 99396 PREV VISIT EST AGE 40-64: CPT | Mod: S$GLB,,, | Performed by: INTERNAL MEDICINE

## 2025-02-07 PROCEDURE — 99999 PR PBB SHADOW E&M-EST. PATIENT-LVL III: CPT | Mod: PBBFAC,,, | Performed by: INTERNAL MEDICINE

## 2025-02-07 PROCEDURE — 3079F DIAST BP 80-89 MM HG: CPT | Mod: CPTII,S$GLB,, | Performed by: INTERNAL MEDICINE

## 2025-02-07 PROCEDURE — 3075F SYST BP GE 130 - 139MM HG: CPT | Mod: CPTII,S$GLB,, | Performed by: INTERNAL MEDICINE

## 2025-02-07 PROCEDURE — 3008F BODY MASS INDEX DOCD: CPT | Mod: CPTII,S$GLB,, | Performed by: INTERNAL MEDICINE

## 2025-02-07 PROCEDURE — 1159F MED LIST DOCD IN RCRD: CPT | Mod: CPTII,S$GLB,, | Performed by: INTERNAL MEDICINE

## 2025-02-07 RX ORDER — LOSARTAN POTASSIUM AND HYDROCHLOROTHIAZIDE 12.5; 5 MG/1; MG/1
1 TABLET ORAL DAILY
Qty: 90 TABLET | Refills: 3 | Status: SHIPPED | OUTPATIENT
Start: 2025-02-07

## 2025-02-07 RX ORDER — VENLAFAXINE HYDROCHLORIDE 75 MG/1
75 CAPSULE, EXTENDED RELEASE ORAL DAILY
Qty: 90 CAPSULE | Refills: 3 | Status: SHIPPED | OUTPATIENT
Start: 2025-02-07 | End: 2026-02-07

## 2025-02-07 RX ORDER — PRAVASTATIN SODIUM 40 MG/1
40 TABLET ORAL DAILY
Qty: 90 TABLET | Refills: 3 | Status: SHIPPED | OUTPATIENT
Start: 2025-02-07

## 2025-02-07 RX ORDER — FENOFIBRATE 160 MG/1
160 TABLET ORAL DAILY
Qty: 90 TABLET | Refills: 3 | Status: SHIPPED | OUTPATIENT
Start: 2025-02-07 | End: 2026-02-02

## 2025-02-07 NOTE — PROGRESS NOTES
[facility] Note    Chief Complaint      Chief Complaint   Patient presents with    Diabetes     6 month        History of Present Illness      History of Present Illness    CHIEF COMPLAINT:  Mr. Bravo presents today for frequent urination at night    URINARY AND ENDOCRINE SYMPTOMS:  He reports nocturia 7-8 times per night. He experiences dry mouth and increased thirst, particularly at night.    SLEEP:  He continues to use CPAP nightly. He reports pain interferes with sleep, though he denies difficulty with sleep initiation or maintenance. Sleep disturbances are primarily attributed to pain.    MUSCULOSKELETAL:  He experiences occasional burning pain in great toe with stretching movements.    CURRENT MEDICATIONS:  He takes fenofibrate for triglycerides, Effexor daily which has helped with stress levels since dose increase, and Pantoprazole. He requires blood pressure medication refill. Previous trial of metformin resulted in bloating without diarrhea.      ROS:  ENT: +dry mouth  Gastrointestinal: -diarrhea, +bloating  Genitourinary: +frequency, +nocturia  Musculoskeletal: +joint pain  Psychiatric: -sleep difficulty       DM2: Trying to control with diet.  Off insulin after starting Ozempic.  Pt is intolerant to metformin due to abdominal bloating and diarrhea.  Then had to discontinue ozempic due to GI side effects as well.  Previously saw Dr. Yuan, endocrinology, who has recently departed [facility].  A1C 7.1 last check.  Eye exam due, last 10/2022.  HTN: BP at goal on losartan-hctz. Sees Dr. Garcia, cardiology, as well  HLD: Controlled on pravastatin, fenofibrate.  GERD: Controlled on protonix.   Depression: incompletely controlled on effexor.    DURGA: Compliant with CPAP.    Flu shot discussed. TdAP 2022.  Pneumovax 2020. Prevnar 2021.  COVID vaccines UTD.    Cscope previously scheduled.  Cologuard 2024, negative.     Assessment/Plan     Nael Bravo is a 54 y.o.male with:    Assessment & Plan     Considered restarting Mounjaro for blood sugar control, pending insurance approval  Evaluated need for metformin as potential step therapy requirement for Mounjaro  Assessed current blood sugar and symptoms suggesting possible hyperglycemia  Reviewed sleep issues, considering continuation of current sleep medication  Maintained current CPAP therapy for sleep apnea  Evaluated need for shingles vaccination given patient's age (55-60 range)    HYPERTENSION:  - Checked the patient's blood pressure during the visit.  - Refilled the patient's blood pressure medication.    HYPERLIPIDEMIA:  - Continued fenofibrate for triglycerides management.  - Continued Pravastatin for cholesterol control.    DEPRESSION:  - Continued Effexor daily for depression management.  - Mr. Bravo reports improved stress levels with increased Effexor dosage.    GASTROESOPHAGEAL REFLUX DISEASE (GERD):  - Continued Pantoprazole (Protonix) for GERD management.    SLEEP APNEA:  - Instructed the patient to continue using CPAP at night.  - Mr. Bravo confirms using CPAP at night and reports improved sleep quality.  - Mr. Bravo confirms compliance with nightly CPAP use.    INSOMNIA:  - Continued sleep medication (Zolpidem).  - Mr. Bravo reports issues with sleep due to pain, affecting both sleep onset and maintenance.    WEIGHT MANAGEMENT:  - Discussed the patient's difficulty in maintaining a healthy diet, especially when eating out.  - Reviewed previous use of Ozempic and discussed potential use of Mounjaro for weight management.    DIABETES:  - Discussed the impact of high-carbohydrate foods like pizza on glucose levels.  - Explained typical symptoms of diabetic neuropathy.  - Instructed the patient to monitor dry mouth, thirst, and urination frequency.  - Mr. Bravo reports symptoms of dry mouth, increased thirst, and increased urination, particularly at night.  - Instructed the patient to monitor dry mouth symptoms.  - Mr. Bravo reports persistent dry  mouth.  - Instructed the patient to monitor urination frequency.  - Mr. Bravo reports frequent nocturnal urination, varying from 2 to 8 times per night.  - Considered glucose issues as a potential cause of increased urination frequency.  - Discussed impact of elevated-carb foods like pizza on blood sugar.    LABS:  - Ordered CBC and CMP to be completed within 1 week.  - Advised the patient to contact the office once labs results are available for further instructions regarding metformin or Mounjaro initiation.    SHINGLES VACCINATION:  - Recommend getting shingles vaccine at local pharmacy.    OPTOMETRY REFERRAL:  - Referred to optometrist for eye exam.    FOLLOW UP:  - Follow up in 6 months.         1. Annual physical exam  - CBC Auto Differential; Future  - Comprehensive Metabolic Panel; Future  - Hemoglobin A1C; Future  - Lipid Panel; Future  - TSH; Future  - PSA, Screening; Future  - Microalbumin/Creatinine Ratio, Urine; Future    2. Type 2 diabetes mellitus without complication, without long-term current use of insulin  - Comprehensive Metabolic Panel; Future  - Hemoglobin A1C; Future  - Microalbumin/Creatinine Ratio, Urine; Future  - pravastatin (PRAVACHOL) 40 MG tablet; Take 1 tablet (40 mg total) by mouth once daily.  Dispense: 90 tablet; Refill: 3  - Ambulatory referral/consult to Optometry; Future  - Comprehensive Metabolic Panel; Future  - Hemoglobin A1C; Future    3. Essential hypertension  - losartan-hydrochlorothiazide 50-12.5 mg (HYZAAR) 50-12.5 mg per tablet; Take 1 tablet by mouth once daily.  Dispense: 90 tablet; Refill: 3    4. Mixed hyperlipidemia  - Lipid Panel; Future    5. GERD without esophagitis    6. Mild recurrent major depression  - venlafaxine (EFFEXOR-XR) 75 MG 24 hr capsule; Take 1 capsule (75 mg total) by mouth once daily.  Dispense: 90 capsule; Refill: 3    7. DURGA (obstructive sleep apnea)    8. Severe obesity (BMI 35.0-39.9) with comorbidity    9. Hypertriglyceridemia  - fenofibrate  160 MG Tab; Take 1 tablet (160 mg total) by mouth once daily.  Dispense: 90 tablet; Refill: 3      Chronic conditions status updated as per HPI.  Other than changes above, cont current medications and maintain follow up with specialists.  Follow up in about 6 months (around 8/7/2025) for Follow up visit.    Future Appointments   Date Time Provider Department Center   5/5/2025 10:00 AM Pratik Barton OD [facility] [place]   8/8/2025  8:00 AM Elpidio Robertson MD [facility] [place]           Past Medical History:  Past Medical History:   Diagnosis Date    Diabetes mellitus     Mixed hyperlipidemia 12/16/2020 11/24/2020 LABS  TG 2914 HDL 20 Fenofibrate ordered 11/    Perirectal abscess     Perirectal abscess        Past Surgical History:   has a past surgical history that includes Appendectomy; Sinus surgery (2006); and Incision and drainage of perirectal region (Left, 02/20/2019).    Family History:  family history includes Breast cancer in his maternal grandmother and paternal grandmother; Cancer in his maternal grandmother and paternal grandmother; Hypertension in his father.     Social History:  Social History     Tobacco Use    Smoking status: Former     Current packs/day: 0.50     Average packs/day: 0.5 packs/day for 10.0 years (5.0 ttl pk-yrs)     Types: Cigarettes    Smokeless tobacco: Never    Tobacco comments:     Quit for 7yrs in between, [id number]   Substance Use Topics    Alcohol use: No    Drug use: No       Medications:  Outpatient Encounter Medications as of 2/7/2025   Medication Sig Dispense Refill    albuterol (PROVENTIL/VENTOLIN HFA) 90 mcg/actuation inhaler Inhale 2 puffs into the lungs every 4 (four) hours as needed. 18 g 1    azelastine (ASTELIN) 137 mcg (0.1 %) nasal spray 1 spray (137 mcg total) by Nasal route once daily. 30 mL 3    fenofibrate 160 MG Tab Take 1 tablet (160 mg total) by mouth once daily. 90 tablet 3    fluticasone propionate (FLONASE) 50 mcg/actuation  "nasal spray 1 spray (50 mcg total) by Each Nostril route once daily. 16 g 3    losartan-hydrochlorothiazide 50-12.5 mg (HYZAAR) 50-12.5 mg per tablet Take 1 tablet by mouth once daily. 90 tablet 3    montelukast (SINGULAIR) 10 mg tablet Take 1 tablet (10 mg total) by mouth once daily. 90 tablet 3    olopatadine (PATADAY) 0.2 % Drop instill 1 drop into affected eye  Once a day 2.5 mL 5    omega-3 acid ethyl esters (LOVAZA) 1 gram capsule Take 2 capsules (2 g total) by mouth 2 (two) times daily. 120 capsule 3    pantoprazole (PROTONIX) 40 MG tablet Take 1 tablet (40 mg total) by mouth once daily. 90 tablet 3    pravastatin (PRAVACHOL) 40 MG tablet Take 1 tablet (40 mg total) by mouth once daily. 90 tablet 3    SURE COMFORT LANCETS 28 gauge Misc Test once daily. Dx E11.9. Any formulary preferred brand.      venlafaxine (EFFEXOR-XR) 75 MG 24 hr capsule Take 1 capsule (75 mg total) by mouth once daily. 90 capsule 3    zaleplon (SONATA) 5 MG Cap Take 1 pill by mouth  nightly as needed for insomnia. 60 capsule 5    [DISCONTINUED] BD ULTRA-FINE OLIVER PEN NEEDLE 32 gauge x 5/32" Ndle Used to inject insulin 4 times a day (Patient not taking: Reported on 8/8/2024) 200 each 3    [DISCONTINUED] blood sugar diagnostic Strp Test once daily.  Dx E11.9.  Any formulary preferred brand. (Patient not taking: Reported on 8/8/2024) 100 each 11    [DISCONTINUED] blood-glucose meter kit Test once daily.  Dx E11.9.  Any formulary preferred brand. (Patient not taking: Reported on 8/8/2024) 1 each 0    [DISCONTINUED] fenofibrate 160 MG Tab Take 1 tablet (160 mg total) by mouth once daily. 90 tablet 3    [DISCONTINUED] FREESTYLE MAGDALENA 2 SENSOR Kit AS directed (Patient not taking: Reported on 8/8/2024) 6 kit 2    [DISCONTINUED] losartan-hydrochlorothiazide 50-12.5 mg (HYZAAR) 50-12.5 mg per tablet Take 1 tablet by mouth once daily. 90 tablet 3    [DISCONTINUED] metFORMIN (GLUCOPHAGE) 500 MG tablet Take 1 tablet (500 mg total) by mouth 2 (two) " times daily with meals. 60 tablet 3    [DISCONTINUED] pravastatin (PRAVACHOL) 40 MG tablet Take 1 tablet (40 mg total) by mouth once daily. 90 tablet 3    [DISCONTINUED] tirzepatide (MOUNJARO) 5 mg/0.5 mL PnIj Inject 5 mg into the skin every 7 days. (Patient not taking: Reported on 8/8/2024) 4 Pen 4    [DISCONTINUED] venlafaxine (EFFEXOR-XR) 75 MG 24 hr capsule Take 1 capsule (75 mg total) by mouth once daily. 30 capsule 11     No facility-administered encounter medications on file as of 2/7/2025.       Allergies:  Review of patient's allergies indicates:  No Known Allergies    Health Maintenance:  Immunization History   Administered Date(s) Administered    COVID-19 MRNA, LN-S PF (MODERNA HALF 0.25 ML DOSE) 01/21/2022    COVID-19, MRNA, LN-S, PF (MODERNA FULL 0.5 ML DOSE) 03/31/2021, 05/20/2021    Influenza 09/19/2016, 09/10/2021    Influenza - Quadrivalent - PF *Preferred* (6 months and older) 12/29/2020, 11/14/2022    Influenza - Trivalent - Afluria, Fluzone MDV 03/08/2016, 02/02/2021    Pneumococcal Conjugate - 13 Valent 02/02/2021    Pneumococcal Polysaccharide - 23 Valent 12/29/2020    Tdap 11/14/2022      Health Maintenance   Topic Date Due    Shingles Vaccine (1 of 2) Never done    Foot Exam  12/04/2021    Diabetic Eye Exam  10/03/2023    Influenza Vaccine (1) 09/01/2024    COVID-19 Vaccine (4 - 2024-25 season) 09/01/2024    Hemoglobin A1c  01/31/2025    Diabetes Urine Screening  07/31/2025    Lipid Panel  07/31/2025    Pneumococcal Vaccines (Age 50+) (3 of 3 - PCV20 or PCV21) 02/02/2026    Low Dose Statin  02/07/2026    Colorectal Cancer Screening  09/04/2027    TETANUS VACCINE  11/14/2032    RSV Vaccine (Age 60+ and Pregnant patients) (1 - 1-dose 75+ series) 11/17/2043    Hepatitis C Screening  Completed    HIV Screening  Completed        Physical Exam      Vital Signs  Pulse: 70  SpO2: 96 %  BP: 130/80  BP Location: Right arm  Patient Position: Sitting  Pain Score: 0-No pain  Height and Weight  Height: 6'  "1.5" (186.7 cm)  Weight: 129.1 kg (284 lb 9.8 oz)  BSA (Calculated - sq m): 2.59 sq meters  BMI (Calculated): 37  Weight in (lb) to have BMI = 25: 191.7]    Physical Exam    General: No acute distress. Well-developed. Well-nourished.  Eyes: EOMI. Sclerae anicteric.  HENT: Normocephalic. Atraumatic. Nares patent. Moist oral mucosa.  Ears: Bilateral TMs clear. Bilateral EACs clear.  Cardiovascular: Regular rate. Regular rhythm. No murmurs. No rubs. No gallops. Normal S1, S2.  Respiratory: Normal respiratory effort. Clear to auscultation bilaterally. No rales. No rhonchi. No wheezing.  Abdomen: Soft. Non-tender. Non-distended. Normoactive bowel sounds.  Musculoskeletal: No  obvious deformity.  Extremities: No lower extremity edema.  Neurological: Alert & oriented x3. No slurred speech. Normal gait.  Psychiatric: Normal mood. Normal affect. Good insight. Good judgment.  Skin: Warm. Dry. No rash.         Physical Exam  Vitals reviewed.   Constitutional:       Appearance: He is well-developed.   HENT:      Head: Normocephalic and atraumatic.      Right Ear: External ear normal.      Left Ear: External ear normal.   Cardiovascular:      Rate and Rhythm: Normal rate and regular rhythm.      Heart sounds: Normal heart sounds. No murmur heard.  Pulmonary:      Effort: Pulmonary effort is normal.      Breath sounds: Normal breath sounds. No wheezing or rales.   Abdominal:      General: Bowel sounds are normal.      Palpations: Abdomen is soft.         Laboratory:    Results              CBC:  Recent Labs   Lab 07/31/24  1108   WBC 13.42 H   RBC 5.54   Hemoglobin 15.4   Hematocrit 48.2   Platelets 276   MCV 87   MCH 27.8   MCHC 32.0     CMP:  Recent Labs   Lab 09/28/22  0945 07/31/24  1108   Glucose 132 H 139 H   Calcium 9.6 10.1   Albumin 4.0 4.1   Total Protein 6.7 7.6   Sodium 140 142   Potassium 4.5 4.5   CO2 26 25   Chloride 104 106   BUN 16 14   Alkaline Phosphatase 55 66   ALT 58 H 39   AST 36 22   Total Bilirubin 0.7 " 0.6     URINALYSIS:       LIPIDS:  Recent Labs   Lab 09/28/22  0945 07/31/24  1108   TSH 2.311 1.724   HDL 27 L 33 L   Cholesterol 160 221 H   Triglycerides 191 H 307 H   LDL Cholesterol 94.8 126.6   HDL/Cholesterol Ratio 16.9 L 14.9 L   Non-HDL Cholesterol 133 188   Total Cholesterol/HDL Ratio 5.9 H 6.7 H     TSH:  Recent Labs   Lab 09/28/22  0945 07/31/24  1108   TSH 2.311 1.724     A1C:  Recent Labs   Lab 09/28/22  0945 07/31/24  1108   Hemoglobin A1C 6.6 H 7.1 H           This note was generated with the assistance of ambient listening technology. Verbal consent was obtained by the patient and accompanying visitor(s) for the recording of patient appointment to facilitate this note. I attest to having reviewed and edited the generated note for accuracy, though some syntax or spelling errors may persist. Please contact the author of this note for any clarification.      Elpidio Robertson MD  [facility]

## 2025-03-24 ENCOUNTER — LAB VISIT (OUTPATIENT)
Dept: LAB | Facility: HOSPITAL | Age: 55
End: 2025-03-24
Attending: INTERNAL MEDICINE
Payer: COMMERCIAL

## 2025-03-24 ENCOUNTER — PATIENT MESSAGE (OUTPATIENT)
Dept: PRIMARY CARE CLINIC | Facility: CLINIC | Age: 55
End: 2025-03-24
Payer: COMMERCIAL

## 2025-03-24 DIAGNOSIS — Z00.00 ANNUAL PHYSICAL EXAM: ICD-10-CM

## 2025-03-24 DIAGNOSIS — E11.9 TYPE 2 DIABETES MELLITUS WITHOUT COMPLICATION, WITHOUT LONG-TERM CURRENT USE OF INSULIN: ICD-10-CM

## 2025-03-24 LAB
ABSOLUTE EOSINOPHIL (OHS): 0.36 K/UL
ABSOLUTE MONOCYTE (OHS): 0.68 K/UL (ref 0.3–1)
ABSOLUTE NEUTROPHIL COUNT (OHS): 5.31 K/UL (ref 1.8–7.7)
ALBUMIN SERPL BCP-MCNC: 3.8 G/DL (ref 3.5–5.2)
ALBUMIN/CREAT UR: 66.1 UG/MG
ALP SERPL-CCNC: 68 UNIT/L (ref 40–150)
ALT SERPL W/O P-5'-P-CCNC: 21 UNIT/L (ref 10–44)
ANION GAP (OHS): 14 MMOL/L (ref 8–16)
AST SERPL-CCNC: 21 UNIT/L (ref 11–45)
BASOPHILS # BLD AUTO: 0.11 K/UL
BASOPHILS NFR BLD AUTO: 1 %
BILIRUB SERPL-MCNC: 0.5 MG/DL (ref 0.1–1)
BUN SERPL-MCNC: 17 MG/DL (ref 6–20)
CALCIUM SERPL-MCNC: 10 MG/DL (ref 8.7–10.5)
CHLORIDE SERPL-SCNC: 98 MMOL/L (ref 95–110)
CHOLEST SERPL-MCNC: 374 MG/DL (ref 120–199)
CHOLEST/HDLC SERPL: 22 {RATIO} (ref 2–5)
CO2 SERPL-SCNC: 24 MMOL/L (ref 23–29)
CREAT SERPL-MCNC: 0.9 MG/DL (ref 0.5–1.4)
CREAT UR-MCNC: 59 MG/DL (ref 23–375)
EAG (OHS): 352 MG/DL (ref 68–131)
ERYTHROCYTE [DISTWIDTH] IN BLOOD BY AUTOMATED COUNT: 14.5 % (ref 11.5–14.5)
GFR SERPLBLD CREATININE-BSD FMLA CKD-EPI: >60 ML/MIN/1.73/M2
GLUCOSE SERPL-MCNC: 305 MG/DL (ref 70–110)
HBA1C MFR BLD: 13.9 % (ref 4–5.6)
HCT VFR BLD AUTO: 44.1 % (ref 40–54)
HDLC SERPL-MCNC: 17 MG/DL (ref 40–75)
HDLC SERPL: 4.5 % (ref 20–50)
HGB BLD-MCNC: 15.1 GM/DL (ref 14–18)
IMM GRANULOCYTES # BLD AUTO: 0.13 K/UL (ref 0–0.04)
IMM GRANULOCYTES NFR BLD AUTO: 1.2 % (ref 0–0.5)
LDLC SERPL CALC-MCNC: ABNORMAL MG/DL
LYMPHOCYTES # BLD AUTO: 4.49 K/UL (ref 1–4.8)
MCH RBC QN AUTO: 29.7 PG (ref 27–50)
MCHC RBC AUTO-ENTMCNC: 34.2 G/DL (ref 32–36)
MCV RBC AUTO: 87 FL (ref 82–98)
MICROALBUMIN UR-MCNC: 39 UG/ML (ref ?–5000)
NONHDLC SERPL-MCNC: 357 MG/DL
NUCLEATED RBC (/100WBC) (OHS): 0 /100 WBC
PLATELET # BLD AUTO: 225 K/UL (ref 150–450)
PMV BLD AUTO: 11.8 FL (ref 9.2–12.9)
POTASSIUM SERPL-SCNC: 5.2 MMOL/L (ref 3.5–5.1)
PROT SERPL-MCNC: 9.2 GM/DL (ref 6–8.4)
PSA SERPL-MCNC: 0.95 NG/ML
RBC # BLD AUTO: 5.08 M/UL (ref 4.6–6.2)
RELATIVE EOSINOPHIL (OHS): 3.2 %
RELATIVE LYMPHOCYTE (OHS): 40.5 % (ref 18–48)
RELATIVE MONOCYTE (OHS): 6.1 % (ref 4–15)
RELATIVE NEUTROPHIL (OHS): 48 % (ref 38–73)
SODIUM SERPL-SCNC: 136 MMOL/L (ref 136–145)
T4 FREE SERPL-MCNC: NORMAL NG/DL
TRIGL SERPL-MCNC: 2332 MG/DL (ref 30–150)
TSH SERPL-ACNC: 1.97 UIU/ML (ref 0.4–4)
WBC # BLD AUTO: 11.08 K/UL (ref 3.9–12.7)

## 2025-03-24 PROCEDURE — 84153 ASSAY OF PSA TOTAL: CPT

## 2025-03-24 PROCEDURE — 83036 HEMOGLOBIN GLYCOSYLATED A1C: CPT

## 2025-03-24 PROCEDURE — 84443 ASSAY THYROID STIM HORMONE: CPT

## 2025-03-24 PROCEDURE — 85025 COMPLETE CBC W/AUTO DIFF WBC: CPT

## 2025-03-24 PROCEDURE — 83718 ASSAY OF LIPOPROTEIN: CPT

## 2025-03-24 PROCEDURE — 82435 ASSAY OF BLOOD CHLORIDE: CPT

## 2025-03-24 PROCEDURE — 36415 COLL VENOUS BLD VENIPUNCTURE: CPT

## 2025-03-24 PROCEDURE — 82043 UR ALBUMIN QUANTITATIVE: CPT

## 2025-03-26 ENCOUNTER — RESULTS FOLLOW-UP (OUTPATIENT)
Dept: PRIMARY CARE CLINIC | Facility: CLINIC | Age: 55
End: 2025-03-26

## 2025-03-27 ENCOUNTER — CLINICAL SUPPORT (OUTPATIENT)
Dept: PRIMARY CARE CLINIC | Facility: CLINIC | Age: 55
End: 2025-03-27
Attending: NURSE PRACTITIONER
Payer: COMMERCIAL

## 2025-03-27 ENCOUNTER — OFFICE VISIT (OUTPATIENT)
Dept: PRIMARY CARE CLINIC | Facility: CLINIC | Age: 55
End: 2025-03-27
Payer: COMMERCIAL

## 2025-03-27 VITALS
OXYGEN SATURATION: 97 % | SYSTOLIC BLOOD PRESSURE: 138 MMHG | HEIGHT: 74 IN | HEART RATE: 73 BPM | WEIGHT: 284.38 LBS | DIASTOLIC BLOOD PRESSURE: 82 MMHG | BODY MASS INDEX: 36.5 KG/M2

## 2025-03-27 DIAGNOSIS — E87.5 HYPERKALEMIA: ICD-10-CM

## 2025-03-27 DIAGNOSIS — E11.9 TYPE 2 DIABETES MELLITUS WITHOUT COMPLICATION, WITHOUT LONG-TERM CURRENT USE OF INSULIN: Primary | ICD-10-CM

## 2025-03-27 DIAGNOSIS — E78.1 HYPERTRIGLYCERIDEMIA: ICD-10-CM

## 2025-03-27 DIAGNOSIS — E11.9 TYPE 2 DIABETES MELLITUS WITHOUT COMPLICATION, WITHOUT LONG-TERM CURRENT USE OF INSULIN: ICD-10-CM

## 2025-03-27 PROCEDURE — 3075F SYST BP GE 130 - 139MM HG: CPT | Mod: CPTII,S$GLB,, | Performed by: NURSE PRACTITIONER

## 2025-03-27 PROCEDURE — 99999 PR PBB SHADOW E&M-EST. PATIENT-LVL III: CPT | Mod: PBBFAC,,, | Performed by: NURSE PRACTITIONER

## 2025-03-27 PROCEDURE — 3079F DIAST BP 80-89 MM HG: CPT | Mod: CPTII,S$GLB,, | Performed by: NURSE PRACTITIONER

## 2025-03-27 PROCEDURE — 1159F MED LIST DOCD IN RCRD: CPT | Mod: CPTII,S$GLB,, | Performed by: NURSE PRACTITIONER

## 2025-03-27 PROCEDURE — 3066F NEPHROPATHY DOC TX: CPT | Mod: CPTII,S$GLB,, | Performed by: NURSE PRACTITIONER

## 2025-03-27 PROCEDURE — 3060F POS MICROALBUMINURIA REV: CPT | Mod: CPTII,S$GLB,, | Performed by: NURSE PRACTITIONER

## 2025-03-27 PROCEDURE — 99214 OFFICE O/P EST MOD 30 MIN: CPT | Mod: S$GLB,,, | Performed by: NURSE PRACTITIONER

## 2025-03-27 PROCEDURE — 3008F BODY MASS INDEX DOCD: CPT | Mod: CPTII,S$GLB,, | Performed by: NURSE PRACTITIONER

## 2025-03-27 PROCEDURE — 3046F HEMOGLOBIN A1C LEVEL >9.0%: CPT | Mod: CPTII,S$GLB,, | Performed by: NURSE PRACTITIONER

## 2025-03-27 RX ORDER — BLOOD-GLUCOSE CONTROL, NORMAL
EACH MISCELLANEOUS
Qty: 100 EACH | Refills: 1 | Status: SHIPPED | OUTPATIENT
Start: 2025-03-27

## 2025-03-27 RX ORDER — METFORMIN HYDROCHLORIDE 500 MG/1
1000 TABLET, EXTENDED RELEASE ORAL 2 TIMES DAILY WITH MEALS
Qty: 360 TABLET | Refills: 3 | Status: SHIPPED | OUTPATIENT
Start: 2025-03-27 | End: 2026-03-27

## 2025-03-27 RX ORDER — DEXTROSE 4 G
TABLET,CHEWABLE ORAL
Qty: 1 EACH | Refills: 0 | Status: SHIPPED | OUTPATIENT
Start: 2025-03-27

## 2025-03-27 NOTE — PROGRESS NOTES
[facility] Note    Chief Complaint      Chief Complaint   Patient presents with    Diabetes       History of Present Illness      Nael Bravo is a 54 y.o. male with chronic conditions of htn, hld, dm2, who presents today for: here for DM2 check up and lab follow up. Most recent A1C 13.1% uncontrolled. Has been drinking more juices - for the past 6 weeks grapefruit juice and orange juice and chips. Currently on fenofibrate.   Recently got laid off. Under a lot of stress.   Insurance ends Monday.     DM2: not currently on any medication. Was on Metformin didn't like it but insurance prefers it.Off insulin after starting Ozempic.  Pt is intolerant to metformin due to abdominal bloating and diarrhea, but willing to try again. Discontinued ozempic due to GI side effects as well.  Previously saw Dr. Yuan, endocrinology, who has recently departed [place].  A1C 7.1 last check.  Eye exam due.      Past Medical History:  Past Medical History:   Diagnosis Date    Allergy 1986    Life long allergies    Diabetes mellitus 10/20    Type2    Food allergy     Mostly dairy    Hypertension 2020    Borderline    Joint pain 89    Back, knees, ankle, shoulder, etc...sports related    Keloid cicatrix 76    Family history, mother's side    Mixed hyperlipidemia 12/16/2020 11/24/2020 LABS  TG 2914 HDL 20 Fenofibrate ordered 11/    Nasal polyps 2018    Perirectal abscess     Perirectal abscess     Seasonal allergies Birth    Sleep apnea 2008       Past Surgical History:   has a past surgical history that includes Appendectomy (2005); Sinus surgery (2006); and Incision and drainage of perirectal region (Left, 02/20/2019).    Family History:  family history includes Breast cancer in his maternal grandmother and paternal grandmother; Cancer in his maternal grandmother and paternal grandmother; Eczema in his brother and maternal grandfather; Hypertension in his father.     Social History:  Social  "History[1]    Review of Systems   Constitutional:  Negative for chills and fever.   Respiratory:  Negative for cough and shortness of breath.    Cardiovascular:  Negative for chest pain and palpitations.   Gastrointestinal:  Negative for constipation, diarrhea, nausea and vomiting.   Genitourinary:  Negative for dysuria and hematuria.   Musculoskeletal:  Negative for falls.   Neurological:  Negative for headaches.        Medications:  Encounter Medications[2]    Allergies:  Review of patient's allergies indicates:  No Known Allergies    Health Maintenance:  Immunization History   Administered Date(s) Administered    COVID-19 MRNA, LN-S PF (MODERNA HALF 0.25 ML DOSE) 01/21/2022    COVID-19, MRNA, LN-S, PF (MODERNA FULL 0.5 ML DOSE) 03/31/2021, 05/20/2021    Influenza 09/19/2016, 09/10/2021    Influenza - Quadrivalent - PF *Preferred* (6 months and older) 12/29/2020, 11/14/2022    Influenza - Trivalent - Afluria, Fluzone MDV 03/08/2016, 02/02/2021    Pneumococcal Conjugate - 13 Valent 02/02/2021    Pneumococcal Polysaccharide - 23 Valent 12/29/2020    Tdap 11/14/2022      Health Maintenance   Topic Date Due    Shingles Vaccine (1 of 2) Never done    Foot Exam  12/04/2021    Diabetic Eye Exam  10/03/2023    Influenza Vaccine (1) 09/01/2024    COVID-19 Vaccine (4 - 2024-25 season) 09/01/2024    Hemoglobin A1c  06/24/2025    Pneumococcal Vaccines (Age 50+) (3 of 3 - PCV20 or PCV21) 02/02/2026    Low Dose Statin  02/07/2026    Diabetes Urine Screening  03/24/2026    Lipid Panel  03/24/2026    Colorectal Cancer Screening  09/04/2027    TETANUS VACCINE  11/14/2032    RSV Vaccine (Age 60+ and Pregnant patients) (1 - 1-dose 75+ series) 11/17/2043    Hepatitis C Screening  Completed    HIV Screening  Completed        Physical Exam      Vital Signs  Pulse: 73  SpO2: 97 %  BP: 138/82  BP Location: Right arm  Patient Position: Sitting  Height and Weight  Height: 6' 1.5" (186.7 cm)  Weight: 129 kg (284 lb 6.3 oz)  BSA (Calculated " - sq m): 2.59 sq meters  BMI (Calculated): 37  Weight in (lb) to have BMI = 25: 191.7]    Physical Exam  Constitutional:       Appearance: He is well-developed.   HENT:      Head: Normocephalic and atraumatic.   Neck:      Thyroid: No thyromegaly.   Cardiovascular:      Rate and Rhythm: Normal rate and regular rhythm.      Heart sounds: No murmur heard.  Pulmonary:      Effort: Pulmonary effort is normal. No respiratory distress.      Breath sounds: Normal breath sounds.   Abdominal:      General: There is no distension.      Palpations: Abdomen is soft.      Tenderness: There is no abdominal tenderness.   Skin:     General: Skin is warm and dry.   Neurological:      Mental Status: He is alert and oriented to person, place, and time.   Psychiatric:         Behavior: Behavior normal.          Laboratory:  CBC:  Recent Labs   Lab 07/31/24  1108 03/24/25  1001   WBC 13.42 H 11.08   RBC 5.54 5.08   Hemoglobin 15.4  --    HGB  --  15.1   Hematocrit 48.2  --    HCT  --  44.1   Platelet Count  --  225   Platelets 276  --    MCV 87 87   MCH 27.8 29.7   MCHC 32.0 34.2     CMP:  Recent Labs   Lab 09/28/22  0945 07/31/24  1108 03/24/25  1001   Glucose 132 H 139 H  --    Calcium 9.6 10.1 10.0   Albumin 4.0 4.1 3.8   Total Protein 6.7 7.6  --    Sodium 140 142 136   Potassium 4.5 4.5 5.2 H   CO2 26 25 24   Chloride 104 106 98   BUN 16 14 17   Alkaline Phosphatase 55 66  --    ALP  --   --  68   ALT 58 H 39 21   AST 36 22 21   Total Bilirubin 0.7 0.6  --    Bilirubin Total  --   --  0.5     URINALYSIS:       LIPIDS:  Recent Labs   Lab 09/28/22  0945 07/31/24  1108 03/24/25  1001   TSH 2.311 1.724 1.973   HDL 27 L 33 L  --    HDL Cholesterol  --   --  17 L   Cholesterol Total  --   --  374 H   Cholesterol 160 221 H  --    Triglycerides 191 H 307 H  --    Triglyceride  --   --  2,332 H   LDL Cholesterol 94.8 126.6  --    HDL/Cholesterol Ratio 16.9 L 14.9 L 4.5 L   Non-HDL Cholesterol 133 188  --    Non HDL Cholesterol  --   --  357    Total Cholesterol/HDL Ratio 5.9 H 6.7 H  --    Cholesterol/HDL Ratio  --   --  22.0 H     TSH:  Recent Labs   Lab 09/28/22  0945 07/31/24  1108 03/24/25  1001   TSH 2.311 1.724 1.973     A1C:  Recent Labs   Lab 09/28/22  0945 07/31/24  1108 03/24/25  1001   Hemoglobin A1C 6.6 H 7.1 H  --    Hemoglobin A1c  --   --  13.9 H       Assessment/Plan     Nael Bravo is a 54 y.o.male with:    1. Type 2 diabetes mellitus without complication, without long-term current use of insulin  - Diabetic Eye Screening Photo; Future  - metFORMIN (GLUCOPHAGE-XR) 500 MG ER 24hr tablet; Take 2 tablets (1,000 mg total) by mouth 2 (two) times daily with meals.  Dispense: 360 tablet; Refill: 3  - Basic Metabolic Panel; Future  - blood-glucose meter kit; To check BG 2 times daily, to use with insurance preferred meter  Dispense: 1 each; Refill: 1  - lancets Misc; To check BG 2 times daily, to use with insurance preferred meter  Dispense: 100 each; Refill: 1  - blood sugar diagnostic Strp; To check BG 2 times daily, to use with insurance preferred meter  Dispense: 100 each; Refill: 1  - discussed strict diet limiting carbs/fatty foods. Will repeat BMP triglycerides on Monday.   -continue fenofibrate.     2. Hypertriglyceridemia  - Lipid Panel; Future    3. Hyperkalemia  - Basic Metabolic Panel; Future       Chronic conditions status updated as per HPI.  Other than changes above, cont current medications and maintain follow up with specialists.  No follow-ups on file.    Future Appointments   Date Time Provider Department Center   5/5/2025 10:00 AM Pratik Barton OD [facility] [place]   8/8/2025  8:00 AM Klibert, David M., MD [facility] [place] Adrienne Cotaya, FNP [facility]                       [1]   Social History  Tobacco Use    Smoking status: Former     Current packs/day: 0.50     Average packs/day: 0.5 packs/day for 10.0 years (5.0 ttl pk-yrs)     Types: Cigarettes    Smokeless tobacco: Never    Tobacco  comments:     Quit for 7yrs in between, [id number]   Substance Use Topics    Alcohol use: No    Drug use: No   [2]   Outpatient Encounter Medications as of 3/27/2025   Medication Sig Dispense Refill    albuterol (PROVENTIL/VENTOLIN HFA) 90 mcg/actuation inhaler Inhale 2 puffs into the lungs every 4 (four) hours as needed. 18 g 1    azelastine (ASTELIN) 137 mcg (0.1 %) nasal spray 1 spray (137 mcg total) by Nasal route once daily. 30 mL 3    fenofibrate 160 MG Tab Take 1 tablet (160 mg total) by mouth once daily. 90 tablet 3    fluticasone propionate (FLONASE) 50 mcg/actuation nasal spray 1 spray (50 mcg total) by Each Nostril route once daily. 16 g 3    losartan-hydrochlorothiazide 50-12.5 mg (HYZAAR) 50-12.5 mg per tablet Take 1 tablet by mouth once daily. 90 tablet 3    montelukast (SINGULAIR) 10 mg tablet Take 1 tablet (10 mg total) by mouth once daily. 90 tablet 3    olopatadine (PATADAY) 0.2 % Drop instill 1 drop into affected eye  Once a day 2.5 mL 5    pantoprazole (PROTONIX) 40 MG tablet Take 1 tablet (40 mg total) by mouth once daily. 90 tablet 3    pravastatin (PRAVACHOL) 40 MG tablet Take 1 tablet (40 mg total) by mouth once daily. 90 tablet 3    venlafaxine (EFFEXOR-XR) 75 MG 24 hr capsule Take 1 capsule (75 mg total) by mouth once daily. 90 capsule 3    zaleplon (SONATA) 5 MG Cap Take 1 pill by mouth  nightly as needed for insomnia. 60 capsule 5    [DISCONTINUED] SURE COMFORT LANCETS 28 gauge Misc Test once daily. Dx E11.9. Any formulary preferred brand.      blood sugar diagnostic Strp To check BG 2 times daily, to use with insurance preferred meter 100 each 1    blood-glucose meter kit To check BG 2 times daily, to use with insurance preferred meter 1 each 1    lancets Misc To check BG 2 times daily, to use with insurance preferred meter 100 each 1    metFORMIN (GLUCOPHAGE-XR) 500 MG ER 24hr tablet Take 2 tablets (1,000 mg total) by mouth 2 (two) times daily with meals. 360 tablet 3    omega-3 acid  ethyl esters (LOVAZA) 1 gram capsule Take 2 capsules (2 g total) by mouth 2 (two) times daily. 120 capsule 3     No facility-administered encounter medications on file as of 3/27/2025.

## 2025-03-27 NOTE — PROGRESS NOTES
Nael Bravo is a 54 y.o. male here for a diabetic eye screening with non-dilated fundus photos per Agueda Thakkar.    Patient cooperative?: Yes  Small pupils?: No  Last eye exam: NA    For exam results, see Encounter Report.

## 2025-03-31 ENCOUNTER — LAB VISIT (OUTPATIENT)
Dept: LAB | Facility: HOSPITAL | Age: 55
End: 2025-03-31
Attending: NURSE PRACTITIONER
Payer: COMMERCIAL

## 2025-03-31 DIAGNOSIS — E78.1 HYPERTRIGLYCERIDEMIA: ICD-10-CM

## 2025-03-31 DIAGNOSIS — E87.5 HYPERKALEMIA: ICD-10-CM

## 2025-03-31 DIAGNOSIS — E11.9 TYPE 2 DIABETES MELLITUS WITHOUT COMPLICATION, WITHOUT LONG-TERM CURRENT USE OF INSULIN: ICD-10-CM

## 2025-03-31 LAB
ANION GAP (OHS): 15 MMOL/L (ref 8–16)
BUN SERPL-MCNC: 20 MG/DL (ref 6–20)
CALCIUM SERPL-MCNC: 10 MG/DL (ref 8.7–10.5)
CHLORIDE SERPL-SCNC: 99 MMOL/L (ref 95–110)
CHOLEST SERPL-MCNC: 367 MG/DL (ref 120–199)
CHOLEST/HDLC SERPL: 20.4 {RATIO} (ref 2–5)
CO2 SERPL-SCNC: 21 MMOL/L (ref 23–29)
CREAT SERPL-MCNC: 0.8 MG/DL (ref 0.5–1.4)
GFR SERPLBLD CREATININE-BSD FMLA CKD-EPI: >60 ML/MIN/1.73/M2
GLUCOSE SERPL-MCNC: 215 MG/DL (ref 70–110)
HDLC SERPL-MCNC: 18 MG/DL (ref 40–75)
HDLC SERPL: 4.9 % (ref 20–50)
LDLC SERPL CALC-MCNC: ABNORMAL MG/DL
NONHDLC SERPL-MCNC: 349 MG/DL
POTASSIUM SERPL-SCNC: 4.7 MMOL/L (ref 3.5–5.1)
SODIUM SERPL-SCNC: 135 MMOL/L (ref 136–145)
TRIGL SERPL-MCNC: 2022 MG/DL (ref 30–150)

## 2025-03-31 PROCEDURE — 82465 ASSAY BLD/SERUM CHOLESTEROL: CPT

## 2025-03-31 PROCEDURE — 36415 COLL VENOUS BLD VENIPUNCTURE: CPT

## 2025-03-31 PROCEDURE — 82435 ASSAY OF BLOOD CHLORIDE: CPT

## 2025-04-01 ENCOUNTER — RESULTS FOLLOW-UP (OUTPATIENT)
Dept: PRIMARY CARE CLINIC | Facility: CLINIC | Age: 55
End: 2025-04-01

## 2025-04-01 ENCOUNTER — PATIENT MESSAGE (OUTPATIENT)
Dept: PRIMARY CARE CLINIC | Facility: CLINIC | Age: 55
End: 2025-04-01
Payer: COMMERCIAL

## 2025-04-01 DIAGNOSIS — E78.1 HYPERTRIGLYCERIDEMIA: ICD-10-CM

## 2025-04-01 DIAGNOSIS — E11.9 TYPE 2 DIABETES MELLITUS WITHOUT COMPLICATION, WITHOUT LONG-TERM CURRENT USE OF INSULIN: Primary | ICD-10-CM

## 2025-04-01 RX ORDER — GLIMEPIRIDE 4 MG/1
4 TABLET ORAL
Qty: 90 TABLET | Refills: 3 | Status: SHIPPED | OUTPATIENT
Start: 2025-04-01 | End: 2026-04-01

## 2025-04-01 RX ORDER — OMEGA-3-ACID ETHYL ESTERS 1 G/1
2 CAPSULE, LIQUID FILLED ORAL 2 TIMES DAILY
Qty: 120 CAPSULE | Refills: 3 | Status: SHIPPED | OUTPATIENT
Start: 2025-04-01 | End: 2026-04-01

## 2025-04-22 DIAGNOSIS — E11.9 TYPE 2 DIABETES MELLITUS WITHOUT COMPLICATION, WITHOUT LONG-TERM CURRENT USE OF INSULIN: ICD-10-CM

## 2025-04-22 DIAGNOSIS — E78.1 HYPERTRIGLYCERIDEMIA: ICD-10-CM

## 2025-04-22 RX ORDER — GLIMEPIRIDE 4 MG/1
4 TABLET ORAL
Qty: 90 TABLET | Refills: 3 | Status: SHIPPED | OUTPATIENT
Start: 2025-04-22 | End: 2026-04-22

## 2025-04-22 RX ORDER — OMEGA-3-ACID ETHYL ESTERS 1 G/1
2 CAPSULE, LIQUID FILLED ORAL 2 TIMES DAILY
Qty: 120 CAPSULE | Refills: 3 | Status: SHIPPED | OUTPATIENT
Start: 2025-04-22 | End: 2026-04-22

## 2025-04-22 RX ORDER — DEXTROSE 4 G
TABLET,CHEWABLE ORAL
Qty: 1 EACH | Refills: 0 | Status: SHIPPED | OUTPATIENT
Start: 2025-04-22 | End: 2026-04-22

## 2025-06-05 ENCOUNTER — TELEPHONE (OUTPATIENT)
Dept: OPTOMETRY | Facility: CLINIC | Age: 55
End: 2025-06-05

## 2025-08-05 ENCOUNTER — PATIENT OUTREACH (OUTPATIENT)
Dept: ADMINISTRATIVE | Facility: HOSPITAL | Age: 55
End: 2025-08-05
Payer: COMMERCIAL

## 2025-08-05 NOTE — PROGRESS NOTES
HM and immunization's reviewed and updated.  Patient is due for Hg A1C(order placed).   Please help schedule or if already done please get information to get records.

## 2025-08-08 ENCOUNTER — OFFICE VISIT (OUTPATIENT)
Dept: PRIMARY CARE CLINIC | Facility: CLINIC | Age: 55
End: 2025-08-08
Payer: COMMERCIAL

## 2025-08-08 ENCOUNTER — LAB VISIT (OUTPATIENT)
Dept: LAB | Facility: HOSPITAL | Age: 55
End: 2025-08-08
Attending: INTERNAL MEDICINE
Payer: COMMERCIAL

## 2025-08-08 VITALS
SYSTOLIC BLOOD PRESSURE: 128 MMHG | WEIGHT: 302.25 LBS | BODY MASS INDEX: 38.79 KG/M2 | HEIGHT: 74 IN | OXYGEN SATURATION: 98 % | DIASTOLIC BLOOD PRESSURE: 88 MMHG | HEART RATE: 53 BPM

## 2025-08-08 DIAGNOSIS — E78.1 HYPERTRIGLYCERIDEMIA: ICD-10-CM

## 2025-08-08 DIAGNOSIS — I10 ESSENTIAL HYPERTENSION: ICD-10-CM

## 2025-08-08 DIAGNOSIS — F33.0 MILD RECURRENT MAJOR DEPRESSION: ICD-10-CM

## 2025-08-08 DIAGNOSIS — E78.2 MIXED HYPERLIPIDEMIA: ICD-10-CM

## 2025-08-08 DIAGNOSIS — E11.9 TYPE 2 DIABETES MELLITUS WITHOUT COMPLICATION, WITHOUT LONG-TERM CURRENT USE OF INSULIN: ICD-10-CM

## 2025-08-08 DIAGNOSIS — G47.33 OSA (OBSTRUCTIVE SLEEP APNEA): ICD-10-CM

## 2025-08-08 DIAGNOSIS — K21.9 GERD WITHOUT ESOPHAGITIS: ICD-10-CM

## 2025-08-08 DIAGNOSIS — E11.9 TYPE 2 DIABETES MELLITUS WITHOUT COMPLICATION, WITHOUT LONG-TERM CURRENT USE OF INSULIN: Primary | ICD-10-CM

## 2025-08-08 LAB
ALBUMIN SERPL BCP-MCNC: 4.1 G/DL (ref 3.5–5.2)
ALP SERPL-CCNC: 58 UNIT/L (ref 40–150)
ALT SERPL W/O P-5'-P-CCNC: 58 UNIT/L (ref 0–55)
ANION GAP (OHS): 10 MMOL/L (ref 8–16)
AST SERPL-CCNC: 48 UNIT/L (ref 0–50)
BILIRUB SERPL-MCNC: 0.3 MG/DL (ref 0.1–1)
BUN SERPL-MCNC: 15 MG/DL (ref 6–20)
CALCIUM SERPL-MCNC: 9.8 MG/DL (ref 8.7–10.5)
CHLORIDE SERPL-SCNC: 102 MMOL/L (ref 95–110)
CHOLEST SERPL-MCNC: 199 MG/DL (ref 120–199)
CHOLEST/HDLC SERPL: 7.4 {RATIO} (ref 2–5)
CO2 SERPL-SCNC: 26 MMOL/L (ref 23–29)
CREAT SERPL-MCNC: 1 MG/DL (ref 0.5–1.4)
EAG (OHS): 206 MG/DL (ref 68–131)
GFR SERPLBLD CREATININE-BSD FMLA CKD-EPI: >60 ML/MIN/1.73/M2
GLUCOSE SERPL-MCNC: 216 MG/DL (ref 70–110)
HBA1C MFR BLD: 8.8 % (ref 4–5.6)
HDLC SERPL-MCNC: 27 MG/DL (ref 40–75)
HDLC SERPL: 13.6 % (ref 20–50)
LDLC SERPL CALC-MCNC: ABNORMAL MG/DL
NONHDLC SERPL-MCNC: 172 MG/DL
POTASSIUM SERPL-SCNC: 4.8 MMOL/L (ref 3.5–5.1)
PROT SERPL-MCNC: 7 GM/DL (ref 6–8.4)
SODIUM SERPL-SCNC: 138 MMOL/L (ref 136–145)
TRIGL SERPL-MCNC: 415 MG/DL (ref 30–150)

## 2025-08-08 PROCEDURE — 36415 COLL VENOUS BLD VENIPUNCTURE: CPT

## 2025-08-08 PROCEDURE — 83036 HEMOGLOBIN GLYCOSYLATED A1C: CPT

## 2025-08-08 PROCEDURE — 80061 LIPID PANEL: CPT

## 2025-08-08 PROCEDURE — 99999 PR PBB SHADOW E&M-EST. PATIENT-LVL IV: CPT | Mod: PBBFAC,,, | Performed by: INTERNAL MEDICINE

## 2025-08-08 PROCEDURE — 80053 COMPREHEN METABOLIC PANEL: CPT

## 2025-08-08 RX ORDER — VENLAFAXINE HYDROCHLORIDE 37.5 MG/1
37.5 CAPSULE, EXTENDED RELEASE ORAL DAILY
Qty: 90 CAPSULE | Refills: 3 | Status: SHIPPED | OUTPATIENT
Start: 2025-08-08 | End: 2026-08-08

## 2025-08-08 RX ORDER — TIRZEPATIDE 2.5 MG/.5ML
2.5 INJECTION, SOLUTION SUBCUTANEOUS
Qty: 4 PEN | Refills: 0 | Status: SHIPPED | OUTPATIENT
Start: 2025-08-08

## 2025-08-08 NOTE — PROGRESS NOTES
[facility] Note    Chief Complaint      Chief Complaint   Patient presents with    Follow-up       History of Present Illness      History of Present Illness    CHIEF COMPLAINT:  Mr. Bravo presents today for follow up of diabetes management and abdominal discomfort.    DIABETES:  He is currently taking Metformin extended-release two pills in the morning and two pills in the evening, and Glimepiride in the morning. He previously tried Ozempic but discontinued due to significant GI side effects. He expressed interest in trying Jardiance with new insurance coverage. He consumed a small piece of dobesh cake during his girlfriend's birthday celebration last night.    GASTROINTESTINAL:  He reports persistent GI symptoms including constant bloating and swelling in the stomach. He describes feeling like he is carrying approximately 10 lbs of bloat weight. He experiences a band-like pain across the stomach, which he describes as uncomfortable and persistent. The bloating does not significantly improve with dietary modifications and appears to be ongoing. He expresses concern about the potential source of the pain, speculating it may be related to pancreatic issues. Symptoms are described as impacting his daily comfort.    POST-NASAL DRIP:  He reports significant post-nasal drip. He previously used Navage nasal irrigation system but discontinued due to severe nasal irritation, describing the experience as causing rawness and burning sensation in the nasal passages.    SEXUAL HEALTH:  He reports decreased sexual sensitivity compared to previous experience, noting he does not seem to have the same level of sensitivity as previously experienced.    EXERCISE:  He reports not exercising much currently. When exercising, he prefers walking. He notes current discomfort is limiting physical activity.    MEDICATIONS:  He is currently taking Effexor for stress management and reports the medication helps control his  stress levels. He previously experienced significant difficulty discontinuing the medication, including experiencing brain zaps and feeling unwell for approximately two weeks during a prior attempt to stop the medication. He has previously been on dosages ranging from 25 to 70 mg. He denies wanting to completely discontinue the medication at this time.      ROS:  ENT: +post nasal drip, +nasal irritation  Gastrointestinal: +abdominal pain, +abdominal distention, +bloating  Male Genitourinary: +sexual difficulty, pain, or concern, +change in sexual performance       DM2: On Metformin, glimepiride.  Off insulin after starting Ozempic.  Pt is intolerant to metformin due to abdominal bloating and diarrhea.  Then had to discontinue ozempic due to GI side effects as well.  Previously saw Dr. Yuan, endocrinology, who has recently departed [facility].  A1C 13.9 last check.  Eye exam UTD, 2025.  HTN: BP at goal on losartan-hctz. Sees Dr. Garcia, cardiology, as well  HLD: Controlled on pravastatin, fenofibrate.  GERD: Controlled on protonix.   Depression: incompletely controlled on effexor.    DURGA: Compliant with CPAP.    Flu shot discussed. TdAP 2022.  Pneumovax 2020. Prevnar 2021.  COVID vaccines UTD.    Cscope previously scheduled.  Cologuard 2024, negative.     Assessment/Plan     Nael Bravo is a 54 y.o.male with:    Assessment & Plan    PLAN SUMMARY:  - Ordered A1C, lipid panel, renal and hepatic function tests  - Decreased Metformin to 1 tablet in the morning and 1 tablet at night for 1-2 weeks  - Continued glimepiride in the morning  - Initiated Mounjaro (tirzepatide) pending insurance approval  - Decreased Effexor to 25 mg; may discontinue after 2 weeks  - Recommend nasal irrigation and Flonase for sinus problems  - Referred to otolaryngology department for evaluation of post-nasal drip  - Follow-up in 3 months  - Additional follow up in October for flu vaccine    TYPE 2 DIABETES MELLITUS WITH DIABETIC NEUROPATHY:  -  "Explained that diabetic neuropathy can cause decreased sensitivity, including in sexual function, and improved glycemic control may help restore nerve function over time.  - Mr. Bravo's blood sugar control has been adequate since initiating Metformin, but experiences band pain and abdominal discomfort.  - Decreased Metformin to 1 tablet in the morning and 1 tablet at night for 1-2 weeks, with potential to discontinue thereafter.  - Continued glimepiride in the morning.  - Initiated Mounjaro (tirzepatide) pending insurance approval; dose to be adjusted monthly based on response.  - Ordered A1C, lipid panel, renal and hepatic function tests.  - Mr. Bravo instructed to contact office monthly to report response to Mounjaro for potential dose adjustments.  - Will monitor blood sugar and adjust medication as needed.  - Mr. Bravo to increase exercise, specifically walking, when weather permits.    MALE ERECTILE DISORDER:  - Discussed that decreased sexual sensitivity is likely due to both diabetic neuropathy affecting nerve function and medication side effects.  - Informed about Effexor's common side effect of sexual desensitization.  - Advised on potential "brain zaps" as a withdrawal symptom when reducing or discontinuing Effexor.  - Decreased Effexor to 25 mg; may discontinue after 2 weeks if desired.    ABDOMINAL DISTENSION:  - Mr. Bravo reports constant bloating and abdominal discomfort, likely related to Metformin.  - Reduced Metformin dosage as noted above and will possibly discontinue it to address these GI issues.    POSTNASAL DRIP:  - Mr. Bravo reports significant post-nasal drip problems.  - Recommend nasal irrigation and Flonase for management.  - Referred to otolaryngology department for evaluation of post-nasal drip and sinus problems.    FOLLOW-UP:  - Follow up in 3 months.  - Additional follow up in October for flu vaccine.         1. Type 2 diabetes mellitus without complication, without long-term current use " of insulin  - tirzepatide (MOUNJARO) 2.5 mg/0.5 mL PnIj; Inject 2.5 mg into the skin every 7 days.  Dispense: 4 Pen; Refill: 0  - Comprehensive Metabolic Panel; Future  - Hemoglobin A1C; Future    2. Essential hypertension    3. Mixed hyperlipidemia  - Lipid Panel; Future    4. Hypertriglyceridemia    5. GERD without esophagitis    6. Mild recurrent major depression    7. DURGA (obstructive sleep apnea)      Chronic conditions status updated as per HPI.  Other than changes above, cont current medications and maintain follow up with specialists.  No follow-ups on file.    Future Appointments   Date Time Provider Department Center   8/22/2025  9:20 AM Fritz Jeong OD [facility] [place]   9/8/2025 10:30 AM Pratik Barton OD [facility] [place]   11/12/2025  8:30 AM Elpidio Robertson MD [facility] [place]           Past Medical History:  Past Medical History:   Diagnosis Date    Allergy 1986    Life long allergies    Diabetes mellitus 10/20    Type2    Food allergy     Mostly dairy    Hypertension 2020    Borderline    Joint pain 89    Back, knees, ankle, shoulder, etc...sports related    Keloid cicatrix 76    Family history, mother's side    Mixed hyperlipidemia 12/16/2020 11/24/2020 LABS  TG 2914 HDL 20 Fenofibrate ordered 11/    Nasal polyps 2018    Perirectal abscess     Perirectal abscess     Seasonal allergies Birth    Sleep apnea 2008       Past Surgical History:   has a past surgical history that includes Appendectomy (2005); Sinus surgery (2006); and Incision and drainage of perirectal region (Left, 02/20/2019).    Family History:  family history includes Breast cancer in his maternal grandmother and paternal grandmother; Cancer in his maternal grandmother and paternal grandmother; Eczema in his brother and maternal grandfather; Hypertension in his father.     Social History:  Social History[1]    Medications:  Encounter Medications[2]    Allergies:  Review of patient's allergies  "indicates:  No Known Allergies    Health Maintenance:  Immunization History   Administered Date(s) Administered    COVID-19 MRNA, LN-S PF (MODERNA HALF 0.25 ML DOSE) 01/21/2022    COVID-19, MRNA, LN-S, PF (MODERNA FULL 0.5 ML DOSE) 03/31/2021, 05/20/2021    Influenza 09/19/2016, 09/10/2021    Influenza - Quadrivalent - PF *Preferred* (6 months and older) 12/29/2020, 11/14/2022    Influenza - Trivalent - Afluria, Fluzone MDV 03/08/2016, 02/02/2021    Pneumococcal Conjugate - 13 Valent 02/02/2021    Pneumococcal Polysaccharide - 23 Valent 12/29/2020    Tdap 11/14/2022      Health Maintenance   Topic Date Due    Shingles Vaccine (1 of 2) Never done    Foot Exam  12/04/2021    COVID-19 Vaccine (4 - 2024-25 season) 09/01/2024    Hemoglobin A1c  06/24/2025    Influenza Vaccine (1) 09/01/2025    Pneumococcal Vaccines (Age 50+) (3 of 3 - PCV20 or PCV21) 02/02/2026    Diabetes Urine Screening  03/24/2026    Low Dose Statin  03/27/2026    Diabetic Eye Exam  03/27/2026    Lipid Panel  03/31/2026    Colorectal Cancer Screening  09/04/2027    TETANUS VACCINE  11/14/2032    RSV Vaccine (Age 60+ and Pregnant patients) (1 - 1-dose 75+ series) 11/17/2043    Hepatitis C Screening  Completed    HIV Screening  Completed        Physical Exam      Vital Signs  Pulse: (!) 53  SpO2: 98 %  BP: 128/88  BP Location: Right arm  Patient Position: Sitting  Pain Score: 0-No pain  Height and Weight  Height: 6' 1.5" (186.7 cm)  Weight: (!) 137.1 kg (302 lb 4 oz)  BSA (Calculated - sq m): 2.67 sq meters  BMI (Calculated): 39.3  Weight in (lb) to have BMI = 25: 191.7]    Physical Exam    General: No acute distress. Well-developed. Well-nourished.  Eyes: EOMI. Sclerae anicteric.  HENT: Normocephalic. Atraumatic. Nares patent. Moist oral mucosa.  Ears: Bilateral TMs clear. Bilateral EACs clear.  Cardiovascular: Regular rate. Regular rhythm. No murmurs. No rubs. No gallops. Normal S1, S2.  Respiratory: Normal respiratory effort. Clear to auscultation " bilaterally. No rales. No rhonchi. No wheezing.  Abdomen: Soft. Non-tender. Abdominal distention. Normoactive bowel sounds.  Musculoskeletal: No  obvious deformity.  Extremities: No lower extremity edema.  Neurological: Alert & oriented x3. No slurred speech. Normal gait.  Psychiatric: Normal mood. Normal affect. Good insight. Good judgment.  Skin: Warm. Dry. No rash.         Physical Exam  Vitals reviewed.   Constitutional:       Appearance: He is well-developed.   HENT:      Head: Normocephalic and atraumatic.      Right Ear: External ear normal.      Left Ear: External ear normal.   Cardiovascular:      Rate and Rhythm: Normal rate and regular rhythm.      Heart sounds: Normal heart sounds. No murmur heard.  Pulmonary:      Effort: Pulmonary effort is normal.      Breath sounds: Normal breath sounds. No wheezing or rales.   Abdominal:      General: Bowel sounds are normal.      Palpations: Abdomen is soft.         Laboratory:    Results              CBC:  Recent Labs   Lab 07/31/24  1108 03/24/25  1001   WBC 13.42 H 11.08   RBC 5.54 5.08   Hemoglobin 15.4  --    HGB  --  15.1   Hematocrit 48.2  --    HCT  --  44.1   Platelet Count  --  225   Platelets 276  --    MCV 87 87   MCH 27.8 29.7   MCHC 32.0 34.2     CMP:  Recent Labs   Lab 09/28/22  0945 07/31/24  1108 03/24/25  1001 03/24/25  1001 03/31/25  0922   Glucose 132 H 139 H 305 H   < > 215 H   Calcium 9.6 10.1 10.0   < > 10.0   Albumin 4.0 4.1 3.8  --   --    Protein Total  --   --  9.2 H  --   --    Total Protein 6.7 7.6  --   --   --    Sodium 140 142 136   < > 135 L   Potassium 4.5 4.5 5.2 H   < > 4.7   CO2 26 25 24   < > 21 L   Chloride 104 106 98   < > 99   BUN 16 14 17   < > 20   Alkaline Phosphatase 55 66  --   --   --    ALP  --   --  68  --   --    ALT 58 H 39 21  --   --    AST 36 22 21  --   --    Total Bilirubin 0.7 0.6  --   --   --    Bilirubin Total  --   --  0.5  --   --     < > = values in this interval not displayed.     URINALYSIS:        LIPIDS:  Recent Labs   Lab 09/28/22  0945 07/31/24  1108 03/24/25  1001 03/31/25  0922   TSH 2.311 1.724 1.973  --    HDL 27 L 33 L  --   --    HDL Cholesterol  --   --  17 L 18 L   Cholesterol Total  --   --  374 H 367 H   Cholesterol 160 221 H  --   --    Triglycerides 191 H 307 H  --   --    Triglyceride  --   --  2,332 H 2,022 H   LDL Cholesterol 94.8 126.6  --   --    HDL/Cholesterol Ratio 16.9 L 14.9 L 4.5 L 4.9 L   Non-HDL Cholesterol 133 188  --   --    Non HDL Cholesterol  --   --  357 349   Total Cholesterol/HDL Ratio 5.9 H 6.7 H  --   --    Cholesterol/HDL Ratio  --   --  22.0 H 20.4 H     TSH:  Recent Labs   Lab 09/28/22  0945 07/31/24  1108 03/24/25  1001   TSH 2.311 1.724 1.973     A1C:  Recent Labs   Lab 09/28/22  0945 07/31/24  1108 03/24/25  1001   Hemoglobin A1C 6.6 H 7.1 H  --    Hemoglobin A1c  --   --  13.9 H           This note was generated with the assistance of ambient listening technology. Verbal consent was obtained by the patient and accompanying visitor(s) for the recording of patient appointment to facilitate this note. I attest to having reviewed and edited the generated note for accuracy, though some syntax or spelling errors may persist. Please contact the author of this note for any clarification.      Elpidio Robertson MD  [facility]                       [1]   Social History  Tobacco Use    Smoking status: Former     Current packs/day: 0.50     Average packs/day: 0.5 packs/day for 10.0 years (5.0 ttl pk-yrs)     Types: Cigarettes    Smokeless tobacco: Never    Tobacco comments:     Quit for 7yrs in between, [id number]   Substance Use Topics    Alcohol use: No    Drug use: No   [2]   Outpatient Encounter Medications as of 8/8/2025   Medication Sig Dispense Refill    albuterol (PROVENTIL/VENTOLIN HFA) 90 mcg/actuation inhaler Inhale 2 puffs into the lungs every 4 (four) hours as needed. 18 g 1    azelastine (ASTELIN) 137 mcg (0.1 %) nasal spray 1 spray (137 mcg total) by  Nasal route once daily. 30 mL 3    azithromycin (Z-ZULEIMA) 250 MG tablet Take as directed (Patient not taking: Reported on 8/8/2025) 6 tablet 0    blood sugar diagnostic Strp To check BG 2 times daily, to use with insurance preferred meter 100 each 1    blood-glucose meter (ONETOUCH VERIO FLEX METER) Misc To check BG 2 times daily, to use with insurance preferred meter 1 each 0    fenofibrate 160 MG Tab Take 1 tablet (160 mg total) by mouth once daily. 90 tablet 3    fluticasone propionate (FLONASE) 50 mcg/actuation nasal spray 1 spray (50 mcg total) by Each Nostril route once daily. 16 g 3    glimepiride (AMARYL) 4 MG tablet Take 1 tablet (4 mg total) by mouth daily before breakfast. 90 tablet 3    lancets 30 gauge Misc use as directed to check blood glucose twice daily 100 each 1    losartan-hydrochlorothiazide 50-12.5 mg (HYZAAR) 50-12.5 mg per tablet Take 1 tablet by mouth once daily. 90 tablet 3    metFORMIN (GLUCOPHAGE-XR) 500 MG ER 24hr tablet Take 2 tablets (1,000 mg total) by mouth 2 (two) times daily with meals. 360 tablet 3    montelukast (SINGULAIR) 10 mg tablet Take 1 tablet (10 mg total) by mouth once daily. 90 tablet 3    olopatadine (PATADAY) 0.2 % Drop instill 1 drop into affected eye  Once a day 2.5 mL 5    omega-3 acid ethyl esters (LOVAZA) 1 gram capsule Take 2 capsules (2 g total) by mouth 2 (two) times daily. 120 capsule 3    pantoprazole (PROTONIX) 40 MG tablet Take 1 tablet (40 mg total) by mouth once daily. 90 tablet 3    pravastatin (PRAVACHOL) 40 MG tablet Take 1 tablet (40 mg total) by mouth once daily. 90 tablet 3    tirzepatide (MOUNJARO) 2.5 mg/0.5 mL PnIj Inject 2.5 mg into the skin every 7 days. 4 Pen 0    venlafaxine (EFFEXOR-XR) 75 MG 24 hr capsule Take 1 capsule (75 mg total) by mouth once daily. 90 capsule 3    zaleplon (SONATA) 5 MG Cap Take 1 pill by mouth  nightly as needed for insomnia. 60 capsule 5     No facility-administered encounter medications on file as of 8/8/2025.

## 2025-09-02 ENCOUNTER — PATIENT MESSAGE (OUTPATIENT)
Dept: PRIMARY CARE CLINIC | Facility: CLINIC | Age: 55
End: 2025-09-02
Payer: COMMERCIAL

## 2025-09-02 DIAGNOSIS — E11.9 TYPE 2 DIABETES MELLITUS WITHOUT COMPLICATION, WITHOUT LONG-TERM CURRENT USE OF INSULIN: ICD-10-CM

## 2025-09-03 RX ORDER — TIRZEPATIDE 2.5 MG/.5ML
2.5 INJECTION, SOLUTION SUBCUTANEOUS
Qty: 12 PEN | Refills: 3 | Status: SHIPPED | OUTPATIENT
Start: 2025-09-03

## (undated) DEVICE — SEE MEDLINE ITEM 157117

## (undated) DEVICE — GLOVE BIOGEL ORTHOPEDIC 7.5

## (undated) DEVICE — NDL HYPDRM 23X15

## (undated) DEVICE — SEE MEDLINE ITEM 154981

## (undated) DEVICE — SEE MEDLINE ITEM 152622

## (undated) DEVICE — STRIP PACKING ANTIMIC 1/4X1

## (undated) DEVICE — SEE MEDLINE ITEM 157116

## (undated) DEVICE — COVER OVERHEAD SURG LT BLUE

## (undated) DEVICE — GAUZE SPONGE 4X4 12PLY

## (undated) DEVICE — SWAB CULTURETTE SINGLE

## (undated) DEVICE — SEE MEDLINE ITEM 156955

## (undated) DEVICE — KIT COLLECTION E SWAB REGULAR

## (undated) DEVICE — ELECTRODE REM PLYHSV RETURN 9